# Patient Record
Sex: FEMALE | Race: WHITE | NOT HISPANIC OR LATINO | Employment: UNEMPLOYED | URBAN - METROPOLITAN AREA
[De-identification: names, ages, dates, MRNs, and addresses within clinical notes are randomized per-mention and may not be internally consistent; named-entity substitution may affect disease eponyms.]

---

## 2017-11-27 ENCOUNTER — COMMUNICATION - HEALTHEAST (OUTPATIENT)
Dept: INTERNAL MEDICINE | Facility: CLINIC | Age: 57
End: 2017-11-27

## 2017-11-29 ENCOUNTER — AMBULATORY - HEALTHEAST (OUTPATIENT)
Dept: INTERNAL MEDICINE | Facility: CLINIC | Age: 57
End: 2017-11-29

## 2018-01-25 ENCOUNTER — OFFICE VISIT (OUTPATIENT)
Dept: FAMILY MEDICINE | Facility: CLINIC | Age: 58
End: 2018-01-25

## 2018-01-25 ENCOUNTER — AMBULATORY - HEALTHEAST (OUTPATIENT)
Dept: MULTI SPECIALTY CLINIC | Facility: CLINIC | Age: 58
End: 2018-01-25

## 2018-01-25 ENCOUNTER — TRANSFERRED RECORDS (OUTPATIENT)
Dept: HEALTH INFORMATION MANAGEMENT | Facility: CLINIC | Age: 58
End: 2018-01-25

## 2018-01-25 VITALS
RESPIRATION RATE: 16 BRPM | DIASTOLIC BLOOD PRESSURE: 71 MMHG | SYSTOLIC BLOOD PRESSURE: 123 MMHG | TEMPERATURE: 97.9 F | HEART RATE: 66 BPM

## 2018-01-25 DIAGNOSIS — Z00.00 ROUTINE GENERAL MEDICAL EXAMINATION AT A HEALTH CARE FACILITY: Primary | ICD-10-CM

## 2018-01-25 LAB
HPV_EXT - HISTORICAL: NORMAL
PAP SMEAR - HIM PATIENT REPORTED: NORMAL

## 2018-01-25 NOTE — MR AVS SNAPSHOT
After Visit Summary   2018    Yoana Weeks    MRN: [id number]           Patient Information     Date Of Birth          1960        Visit Information        Provider Department      2018 8:45 PM Clinician, Lupe rashi [facility]        Today's Diagnoses     Routine general medical examination at a health care facility    -  1       Follow-ups after your visit        Who to contact     Please call your clinic at [phone] to:    Ask questions about your health    Make or cancel appointments    Discuss your medicines    Learn about your test results    Speak to your doctor   If you have compliments or concerns about an experience at your clinic, or if you wish to file a complaint, please contact [facility] Patient Relations at [phone] or email us at [email or web address]         Additional Information About Your Visit        MyChart Information     [employer] is an electronic gateway that provides easy, online access to your medical records. With [employer], you can request a clinic appointment, read your test results, renew a prescription or communicate with your care team.     To sign up for [employer] visit the website at [email or web address]   You will be asked to enter the access code listed below, as well as some personal information. Please follow the directions to create your username and password.     Your access code is: [email or web address]  Expires: 2018 11:09 PM     Your access code will  in 90 days. If you need help or a new code, please contact your [facility] Clinic or call [phone] for assistance.        Care EveryWhere ID     This is your Care EveryWhere ID. This could be used by other organizations to access your [place] medical records  [id number]        Your Vitals Were     Pulse Temperature Respirations             66 97.9  F (36.6  C) (Oral) 16          Blood Pressure  from Last 3 Encounters:   01/25/18 123/71    Weight from Last 3 Encounters:   No data found for Wt              We Performed the Following     HPV High Risk Types DNA Cervical     Pap imaged thin layer screen with HPV - recommended age 30 - 65 years (select HPV order below)        Primary Care Provider    None Specified       No primary provider on file.        Equal Access to Services     [facility]: Geremias Vásquez, georgia thompson, maria elena mcgeealmaharsha cruz, ashlee luis . So [facility] [phone].    ATENCIÓN: Si habla español, tiene a hernandez disposición servicios gratuitos de asistencia lingüística. Llame al [phone].    We comply with applicable federal civil rights laws and [place] laws. We do not discriminate on the basis of race, color, national origin, age, disability, sex, sexual orientation, or gender identity.            Thank you!     Thank you for choosing [facility]  for your care. Our goal is always to provide you with excellent care. Hearing back from our patients is one way we can continue to improve our services. Please take a few minutes to complete the written survey that you may receive in the mail after your visit with us. Thank you!             Your Updated Medication List - Protect others around you: Learn how to safely use, store and throw away your medicines at www.disposemymeds.org.      Notice  As of 1/25/2018 11:59 PM    You have not been prescribed any medications.

## 2018-01-26 NOTE — PROGRESS NOTES
Date of Visit: January 25, 2018  [facility] CERVICAL CANCER SCREENING VISIT AND CLINICAL BREAST EXAM    S: Yoana Weeks is a 57 year old female who presents to [facility] to receive a pap smear through the RAMON screening program, clinical breast exam, and mammogram. She has no other complaints. She is not currently sexually active and denies vaginal discharge or bleeding. She has never had a vaginal delivery. Her last pap smear was 2 years ago and says she has previously had an abnormal pap smear that was worked up (could not recall further details).      O:   /71 (BP Location: Right arm)  Pulse 66  Temp 97.9  F (36.6  C) (Oral)  Resp 16  General: Alert and oriented. In no acute distress.   Respiratory: Non-labored breathing.  Breast: Symmetrical breasts with no deformities, skin changes, or nipple discharge. No palpable masses. No axillary lymphadenopathy.  Gyn:   External genitalia: No lesions, erythema or rashes noted. Some atrophic changes of the labia - discomfort with insertion of speculum  Vagina: Rugated, white discharge, no lesions  Cervix: No motion tenderness, patent os without discharge, no lesions  Uterus: Small, mobile, midline, non-tender  Adnexa: No masses or tenderness bilaterally  Neuro: Moving all extremities, gait balanced.  Psych: Mood appears stable.     Assessment and Plan  Cervical Cancer Screening  A: Yoana Weeks is a 57 year old female who presents for a pap smear.     P:   Patient was screened for cervical cancer today via pap smear collection through the RAMON screening program. Appropriate follow up will be recommended to patient pending results.      Mammogram  A:Clinical breast exam with dense tissue in R breast.     P:   Patient will proceed with a screening mammogram through the RAMON screening program.     Patient was seen by medical clinician Madiha Davis  and Dr. Jovani Garcia  on January 25, 2018.

## 2018-01-29 LAB
COPATH REPORT: NORMAL
PAP: NORMAL

## 2018-01-31 LAB
FINAL DIAGNOSIS: NORMAL
HPV HR 12 DNA CVX QL NAA+PROBE: NEGATIVE
HPV16 DNA SPEC QL NAA+PROBE: NEGATIVE
HPV18 DNA SPEC QL NAA+PROBE: NEGATIVE
SPECIMEN DESCRIPTION: NORMAL
SPECIMEN SOURCE CVX/VAG CYTO: NORMAL

## 2019-01-24 ENCOUNTER — TRANSFERRED RECORDS (OUTPATIENT)
Dept: HEALTH INFORMATION MANAGEMENT | Facility: CLINIC | Age: 59
End: 2019-01-24

## 2019-01-24 ENCOUNTER — AMBULATORY - HEALTHEAST (OUTPATIENT)
Dept: MULTI SPECIALTY CLINIC | Facility: CLINIC | Age: 59
End: 2019-01-24

## 2019-01-24 ENCOUNTER — APPOINTMENT (OUTPATIENT)
Dept: FAMILY MEDICINE | Facility: CLINIC | Age: 59
End: 2019-01-24

## 2019-04-29 ENCOUNTER — COMMUNICATION - HEALTHEAST (OUTPATIENT)
Dept: INTERNAL MEDICINE | Facility: CLINIC | Age: 59
End: 2019-04-29

## 2019-04-30 ENCOUNTER — OFFICE VISIT - HEALTHEAST (OUTPATIENT)
Dept: INTERNAL MEDICINE | Facility: CLINIC | Age: 59
End: 2019-04-30

## 2019-04-30 DIAGNOSIS — I25.10 CORONARY ARTERY DISEASE INVOLVING NATIVE CORONARY ARTERY OF NATIVE HEART WITHOUT ANGINA PECTORIS: ICD-10-CM

## 2019-04-30 DIAGNOSIS — R05.9 COUGH: ICD-10-CM

## 2019-04-30 DIAGNOSIS — Z12.11 SCREEN FOR COLON CANCER: ICD-10-CM

## 2019-04-30 DIAGNOSIS — I10 ESSENTIAL HYPERTENSION, BENIGN: ICD-10-CM

## 2019-04-30 DIAGNOSIS — R19.5 CHANGE IN STOOL CALIBER: ICD-10-CM

## 2019-04-30 ASSESSMENT — MIFFLIN-ST. JEOR: SCORE: 1197.61

## 2019-05-02 ENCOUNTER — HOSPITAL ENCOUNTER (OUTPATIENT)
Dept: CT IMAGING | Facility: CLINIC | Age: 59
Discharge: HOME | End: 2019-05-02

## 2019-05-02 ENCOUNTER — AMBULATORY - HEALTHEAST (OUTPATIENT)
Dept: LAB | Facility: CLINIC | Age: 59
End: 2019-05-02

## 2019-05-02 DIAGNOSIS — I10 ESSENTIAL HYPERTENSION, BENIGN: ICD-10-CM

## 2019-05-02 DIAGNOSIS — Z12.11 SCREENING FOR COLON CANCER: ICD-10-CM

## 2019-05-02 DIAGNOSIS — R19.5 CHANGE IN STOOL CALIBER: ICD-10-CM

## 2019-05-02 DIAGNOSIS — R05.9 COUGH: ICD-10-CM

## 2019-05-02 DIAGNOSIS — I25.10 CORONARY ARTERY DISEASE INVOLVING NATIVE CORONARY ARTERY OF NATIVE HEART WITHOUT ANGINA PECTORIS: ICD-10-CM

## 2019-05-02 LAB
ALBUMIN SERPL-MCNC: 3.8 G/DL (ref 3.5–5)
ALP SERPL-CCNC: 65 U/L (ref 45–120)
ALT SERPL W P-5'-P-CCNC: <9 U/L (ref 0–45)
ANION GAP SERPL CALCULATED.3IONS-SCNC: 6 MMOL/L (ref 5–18)
AST SERPL W P-5'-P-CCNC: 12 U/L (ref 0–40)
BILIRUB SERPL-MCNC: 0.4 MG/DL (ref 0–1)
BUN SERPL-MCNC: 15 MG/DL (ref 8–22)
CALCIUM SERPL-MCNC: 9.5 MG/DL (ref 8.5–10.5)
CHLORIDE BLD-SCNC: 103 MMOL/L (ref 98–107)
CHOLEST SERPL-MCNC: 210 MG/DL
CO2 SERPL-SCNC: 29 MMOL/L (ref 22–31)
CREAT SERPL-MCNC: 1.15 MG/DL (ref 0.6–1.1)
ERYTHROCYTE [DISTWIDTH] IN BLOOD BY AUTOMATED COUNT: 12.8 % (ref 11–14.5)
FASTING STATUS PATIENT QL REPORTED: YES
GFR SERPL CREATININE-BSD FRML MDRD: 48 ML/MIN/1.73M2
GLUCOSE BLD-MCNC: 103 MG/DL (ref 70–125)
HCT VFR BLD AUTO: 37.3 % (ref 35–47)
HDLC SERPL-MCNC: 47 MG/DL
HGB BLD-MCNC: 12.5 G/DL (ref 12–16)
LDLC SERPL CALC-MCNC: 138 MG/DL
MCH RBC QN AUTO: 29.7 PG (ref 27–34)
MCHC RBC AUTO-ENTMCNC: 33.5 G/DL (ref 32–36)
MCV RBC AUTO: 89 FL (ref 80–100)
PLATELET # BLD AUTO: 322 THOU/UL (ref 140–440)
PMV BLD AUTO: 8.5 FL (ref 7–10)
POTASSIUM BLD-SCNC: 4.7 MMOL/L (ref 3.5–5)
PROT SERPL-MCNC: 7.1 G/DL (ref 6–8)
RBC # BLD AUTO: 4.21 MILL/UL (ref 3.8–5.4)
SODIUM SERPL-SCNC: 138 MMOL/L (ref 136–145)
TRIGL SERPL-MCNC: 125 MG/DL
TSH SERPL DL<=0.005 MIU/L-ACNC: 1.72 UIU/ML (ref 0.3–5)
WBC: 10.5 THOU/UL (ref 4–11)

## 2019-05-06 ENCOUNTER — COMMUNICATION - HEALTHEAST (OUTPATIENT)
Dept: INTERNAL MEDICINE | Facility: CLINIC | Age: 59
End: 2019-05-06

## 2019-05-28 ENCOUNTER — RECORDS - HEALTHEAST (OUTPATIENT)
Dept: ADMINISTRATIVE | Facility: OTHER | Age: 59
End: 2019-05-28

## 2019-06-07 ENCOUNTER — COMMUNICATION - HEALTHEAST (OUTPATIENT)
Dept: INTERNAL MEDICINE | Facility: CLINIC | Age: 59
End: 2019-06-07

## 2019-06-07 ENCOUNTER — OFFICE VISIT - HEALTHEAST (OUTPATIENT)
Dept: INTERNAL MEDICINE | Facility: CLINIC | Age: 59
End: 2019-06-07

## 2019-06-07 DIAGNOSIS — L84 CALLUS OF FOOT: ICD-10-CM

## 2019-06-07 DIAGNOSIS — Z80.52 FAMILY HISTORY OF BLADDER CANCER: ICD-10-CM

## 2019-06-07 DIAGNOSIS — R05.3 CHRONIC COUGH: ICD-10-CM

## 2019-06-07 DIAGNOSIS — I10 ESSENTIAL HYPERTENSION, BENIGN: ICD-10-CM

## 2019-06-07 DIAGNOSIS — I25.10 CORONARY ARTERY DISEASE INVOLVING NATIVE CORONARY ARTERY OF NATIVE HEART WITHOUT ANGINA PECTORIS: ICD-10-CM

## 2019-06-07 DIAGNOSIS — R49.0 HOARSENESS: ICD-10-CM

## 2019-06-07 DIAGNOSIS — F17.200 TOBACCO DEPENDENCE SYNDROME: ICD-10-CM

## 2019-06-07 LAB
ALBUMIN UR-MCNC: NEGATIVE MG/DL
APPEARANCE UR: CLEAR
ATRIAL RATE - MUSE: 65 BPM
BILIRUB UR QL STRIP: NEGATIVE
COLOR UR AUTO: YELLOW
DIASTOLIC BLOOD PRESSURE - MUSE: NORMAL MMHG
GLUCOSE UR STRIP-MCNC: NEGATIVE MG/DL
HGB UR QL STRIP: NEGATIVE
INTERPRETATION ECG - MUSE: NORMAL
KETONES UR STRIP-MCNC: NEGATIVE MG/DL
LEUKOCYTE ESTERASE UR QL STRIP: NEGATIVE
NITRATE UR QL: NEGATIVE
P AXIS - MUSE: 70 DEGREES
PH UR STRIP: 5.5 [PH] (ref 5–8)
PR INTERVAL - MUSE: 180 MS
QRS DURATION - MUSE: 80 MS
QT - MUSE: 436 MS
QTC - MUSE: 453 MS
R AXIS - MUSE: 70 DEGREES
SP GR UR STRIP: <=1.005 (ref 1–1.03)
SYSTOLIC BLOOD PRESSURE - MUSE: NORMAL MMHG
T AXIS - MUSE: 62 DEGREES
UROBILINOGEN UR STRIP-ACNC: NORMAL
VENTRICULAR RATE- MUSE: 65 BPM

## 2019-06-07 RX ORDER — TRIAMTERENE AND HYDROCHLOROTHIAZIDE 37.5; 25 MG/1; MG/1
1 CAPSULE ORAL DAILY
Qty: 90 CAPSULE | Refills: 3 | Status: SHIPPED | OUTPATIENT
Start: 2019-06-07

## 2019-06-07 ASSESSMENT — MIFFLIN-ST. JEOR: SCORE: 1198.16

## 2019-06-12 ENCOUNTER — RECORDS - HEALTHEAST (OUTPATIENT)
Dept: ADMINISTRATIVE | Facility: OTHER | Age: 59
End: 2019-06-12

## 2019-08-09 ENCOUNTER — OFFICE VISIT - HEALTHEAST (OUTPATIENT)
Dept: INTERNAL MEDICINE | Facility: CLINIC | Age: 59
End: 2019-08-09

## 2019-08-09 DIAGNOSIS — E78.5 HYPERLIPIDEMIA, UNSPECIFIED HYPERLIPIDEMIA TYPE: ICD-10-CM

## 2019-08-09 DIAGNOSIS — Z72.0 TOBACCO ABUSE: ICD-10-CM

## 2019-08-09 DIAGNOSIS — Z91.148 NONCOMPLIANCE WITH MEDICATION REGIMEN: ICD-10-CM

## 2019-08-09 DIAGNOSIS — I10 ESSENTIAL HYPERTENSION, BENIGN: ICD-10-CM

## 2019-08-09 DIAGNOSIS — I25.10 CORONARY ARTERY DISEASE INVOLVING NATIVE CORONARY ARTERY OF NATIVE HEART WITHOUT ANGINA PECTORIS: ICD-10-CM

## 2019-08-09 ASSESSMENT — MIFFLIN-ST. JEOR: SCORE: 1193.08

## 2019-08-14 ENCOUNTER — RECORDS - HEALTHEAST (OUTPATIENT)
Dept: ADMINISTRATIVE | Facility: OTHER | Age: 59
End: 2019-08-14

## 2019-09-03 ENCOUNTER — RECORDS - HEALTHEAST (OUTPATIENT)
Dept: ADMINISTRATIVE | Facility: OTHER | Age: 59
End: 2019-09-03

## 2019-09-23 ENCOUNTER — RECORDS - HEALTHEAST (OUTPATIENT)
Dept: ADMINISTRATIVE | Facility: OTHER | Age: 59
End: 2019-09-23

## 2019-10-15 ENCOUNTER — RECORDS - HEALTHEAST (OUTPATIENT)
Dept: ADMINISTRATIVE | Facility: OTHER | Age: 59
End: 2019-10-15

## 2019-10-24 ENCOUNTER — OFFICE VISIT - HEALTHEAST (OUTPATIENT)
Dept: INTERNAL MEDICINE | Facility: CLINIC | Age: 59
End: 2019-10-24

## 2019-10-24 DIAGNOSIS — J44.9 CHRONIC OBSTRUCTIVE PULMONARY DISEASE, UNSPECIFIED COPD TYPE (H): ICD-10-CM

## 2019-10-24 DIAGNOSIS — E78.5 HYPERLIPIDEMIA, UNSPECIFIED HYPERLIPIDEMIA TYPE: ICD-10-CM

## 2019-10-24 DIAGNOSIS — I10 ESSENTIAL HYPERTENSION, BENIGN: ICD-10-CM

## 2019-10-24 DIAGNOSIS — I25.10 CORONARY ARTERY DISEASE INVOLVING NATIVE CORONARY ARTERY OF NATIVE HEART WITHOUT ANGINA PECTORIS: ICD-10-CM

## 2019-10-24 LAB
ANION GAP SERPL CALCULATED.3IONS-SCNC: 9 MMOL/L (ref 5–18)
BASOPHILS # BLD AUTO: 0.1 THOU/UL (ref 0–0.2)
BASOPHILS NFR BLD AUTO: 1 % (ref 0–2)
BUN SERPL-MCNC: 10 MG/DL (ref 8–22)
CALCIUM SERPL-MCNC: 9.7 MG/DL (ref 8.5–10.5)
CHLORIDE BLD-SCNC: 105 MMOL/L (ref 98–107)
CHOLEST SERPL-MCNC: 255 MG/DL
CO2 SERPL-SCNC: 26 MMOL/L (ref 22–31)
CREAT SERPL-MCNC: 1.1 MG/DL (ref 0.6–1.1)
EOSINOPHIL # BLD AUTO: 0.1 THOU/UL (ref 0–0.4)
EOSINOPHIL NFR BLD AUTO: 1 % (ref 0–6)
ERYTHROCYTE [DISTWIDTH] IN BLOOD BY AUTOMATED COUNT: 13.6 % (ref 11–14.5)
FASTING STATUS PATIENT QL REPORTED: YES
GFR SERPL CREATININE-BSD FRML MDRD: 51 ML/MIN/1.73M2
GLUCOSE BLD-MCNC: 93 MG/DL (ref 70–125)
HCT VFR BLD AUTO: 39.1 % (ref 35–47)
HDLC SERPL-MCNC: 53 MG/DL
HGB BLD-MCNC: 12.4 G/DL (ref 12–16)
LDLC SERPL CALC-MCNC: 177 MG/DL
LYMPHOCYTES # BLD AUTO: 2.6 THOU/UL (ref 0.8–4.4)
LYMPHOCYTES NFR BLD AUTO: 31 % (ref 20–40)
MCH RBC QN AUTO: 29.3 PG (ref 27–34)
MCHC RBC AUTO-ENTMCNC: 31.7 G/DL (ref 32–36)
MCV RBC AUTO: 92 FL (ref 80–100)
MONOCYTES # BLD AUTO: 0.5 THOU/UL (ref 0–0.9)
MONOCYTES NFR BLD AUTO: 6 % (ref 2–10)
NEUTROPHILS # BLD AUTO: 5.2 THOU/UL (ref 2–7.7)
NEUTROPHILS NFR BLD AUTO: 62 % (ref 50–70)
PLATELET # BLD AUTO: 377 THOU/UL (ref 140–440)
PMV BLD AUTO: 11.4 FL (ref 8.5–12.5)
POTASSIUM BLD-SCNC: 5 MMOL/L (ref 3.5–5)
RBC # BLD AUTO: 4.23 MILL/UL (ref 3.8–5.4)
SODIUM SERPL-SCNC: 140 MMOL/L (ref 136–145)
TRIGL SERPL-MCNC: 125 MG/DL
WBC: 8.4 THOU/UL (ref 4–11)

## 2019-10-24 ASSESSMENT — MIFFLIN-ST. JEOR: SCORE: 1202.69

## 2019-10-25 ENCOUNTER — AMBULATORY - HEALTHEAST (OUTPATIENT)
Dept: INTERNAL MEDICINE | Facility: CLINIC | Age: 59
End: 2019-10-25

## 2019-10-25 DIAGNOSIS — E78.5 HYPERLIPIDEMIA, UNSPECIFIED HYPERLIPIDEMIA TYPE: ICD-10-CM

## 2019-10-25 RX ORDER — ATORVASTATIN CALCIUM 20 MG/1
20 TABLET, FILM COATED ORAL DAILY
Qty: 90 TABLET | Refills: 3 | Status: SHIPPED | OUTPATIENT
Start: 2019-10-25

## 2019-10-28 ENCOUNTER — COMMUNICATION - HEALTHEAST (OUTPATIENT)
Dept: INTERNAL MEDICINE | Facility: CLINIC | Age: 59
End: 2019-10-28

## 2020-03-30 ENCOUNTER — COMMUNICATION - HEALTHEAST (OUTPATIENT)
Dept: INTERNAL MEDICINE | Facility: CLINIC | Age: 60
End: 2020-03-30

## 2021-01-11 ENCOUNTER — HOSPITAL ENCOUNTER (EMERGENCY)
Facility: HOSPITAL | Age: 61
Discharge: LEFT BEFORE DISCHARGE | End: 2021-01-11

## 2021-01-11 VITALS
WEIGHT: 124 LBS | HEIGHT: 63 IN | DIASTOLIC BLOOD PRESSURE: 62 MMHG | HEART RATE: 81 BPM | BODY MASS INDEX: 21.97 KG/M2 | TEMPERATURE: 98.2 F | OXYGEN SATURATION: 98 % | RESPIRATION RATE: 17 BRPM | SYSTOLIC BLOOD PRESSURE: 141 MMHG

## 2021-01-11 LAB
ALBUMIN SERPL-MCNC: 4.2 G/DL (ref 3.5–5.2)
ALBUMIN/GLOB SERPL: 1.1 G/DL
ALP SERPL-CCNC: 122 U/L (ref 39–117)
ALT SERPL W P-5'-P-CCNC: 9 U/L (ref 1–33)
ANION GAP SERPL CALCULATED.3IONS-SCNC: 13 MMOL/L (ref 5–15)
AST SERPL-CCNC: 18 U/L (ref 1–32)
BASOPHILS # BLD AUTO: 0.07 10*3/MM3 (ref 0–0.2)
BASOPHILS NFR BLD AUTO: 0.5 % (ref 0–1.5)
BILIRUB SERPL-MCNC: 0.7 MG/DL (ref 0–1.2)
BUN SERPL-MCNC: 10 MG/DL (ref 8–23)
BUN/CREAT SERPL: 9.5 (ref 7–25)
CALCIUM SPEC-SCNC: 9.4 MG/DL (ref 8.6–10.5)
CHLORIDE SERPL-SCNC: 93 MMOL/L (ref 98–107)
CO2 SERPL-SCNC: 29 MMOL/L (ref 22–29)
CREAT SERPL-MCNC: 1.05 MG/DL (ref 0.57–1)
DEPRECATED RDW RBC AUTO: 43.8 FL (ref 37–54)
EOSINOPHIL # BLD AUTO: 0.04 10*3/MM3 (ref 0–0.4)
EOSINOPHIL NFR BLD AUTO: 0.3 % (ref 0.3–6.2)
ERYTHROCYTE [DISTWIDTH] IN BLOOD BY AUTOMATED COUNT: 14.1 % (ref 12.3–15.4)
GFR SERPL CREATININE-BSD FRML MDRD: 53 ML/MIN/1.73
GFR SERPL CREATININE-BSD FRML MDRD: 64 ML/MIN/1.73
GLOBULIN UR ELPH-MCNC: 3.7 GM/DL
GLUCOSE SERPL-MCNC: 126 MG/DL (ref 65–99)
HCT VFR BLD AUTO: 36.8 % (ref 34–46.6)
HGB BLD-MCNC: 12.8 G/DL (ref 12–15.9)
HOLD SPECIMEN: NORMAL
HOLD SPECIMEN: NORMAL
IMM GRANULOCYTES # BLD AUTO: 0.06 10*3/MM3 (ref 0–0.05)
IMM GRANULOCYTES NFR BLD AUTO: 0.4 % (ref 0–0.5)
LYMPHOCYTES # BLD AUTO: 2.23 10*3/MM3 (ref 0.7–3.1)
LYMPHOCYTES NFR BLD AUTO: 15 % (ref 19.6–45.3)
MCH RBC QN AUTO: 30 PG (ref 26.6–33)
MCHC RBC AUTO-ENTMCNC: 34.8 G/DL (ref 31.5–35.7)
MCV RBC AUTO: 86.2 FL (ref 79–97)
MONOCYTES # BLD AUTO: 0.99 10*3/MM3 (ref 0.1–0.9)
MONOCYTES NFR BLD AUTO: 6.6 % (ref 5–12)
NEUTROPHILS NFR BLD AUTO: 11.51 10*3/MM3 (ref 1.7–7)
NEUTROPHILS NFR BLD AUTO: 77.2 % (ref 42.7–76)
NRBC BLD AUTO-RTO: 0 /100 WBC (ref 0–0.2)
PLATELET # BLD AUTO: 437 10*3/MM3 (ref 140–450)
PMV BLD AUTO: 10.3 FL (ref 6–12)
POTASSIUM SERPL-SCNC: 3.2 MMOL/L (ref 3.5–5.2)
PROT SERPL-MCNC: 7.9 G/DL (ref 6–8.5)
RBC # BLD AUTO: 4.27 10*6/MM3 (ref 3.77–5.28)
SODIUM SERPL-SCNC: 135 MMOL/L (ref 136–145)
TROPONIN T SERPL-MCNC: <0.01 NG/ML (ref 0–0.03)
WBC # BLD AUTO: 14.9 10*3/MM3 (ref 3.4–10.8)
WHOLE BLOOD HOLD SPECIMEN: NORMAL
WHOLE BLOOD HOLD SPECIMEN: NORMAL

## 2021-01-11 PROCEDURE — 85025 COMPLETE CBC W/AUTO DIFF WBC: CPT

## 2021-01-11 PROCEDURE — 93005 ELECTROCARDIOGRAM TRACING: CPT

## 2021-01-11 PROCEDURE — 84484 ASSAY OF TROPONIN QUANT: CPT

## 2021-01-11 PROCEDURE — 99211 OFF/OP EST MAY X REQ PHY/QHP: CPT

## 2021-01-11 PROCEDURE — 80053 COMPREHEN METABOLIC PANEL: CPT

## 2021-01-11 PROCEDURE — 93010 ELECTROCARDIOGRAM REPORT: CPT | Performed by: INTERNAL MEDICINE

## 2021-01-11 RX ORDER — SODIUM CHLORIDE 0.9 % (FLUSH) 0.9 %
10 SYRINGE (ML) INJECTION AS NEEDED
Status: DISCONTINUED | OUTPATIENT
Start: 2021-01-11 | End: 2021-01-11 | Stop reason: HOSPADM

## 2021-01-11 RX ORDER — ASPIRIN 81 MG/1
324 TABLET, CHEWABLE ORAL ONCE
Status: DISCONTINUED | OUTPATIENT
Start: 2021-01-11 | End: 2021-01-11 | Stop reason: HOSPADM

## 2021-01-11 NOTE — ED NOTES
Pt states she needs to be gone by 1600 or 15-20 minutes before the sun goes down as she cannot see well at night and isn't in a place she is familiar with.      Katie Holm  01/11/21 1500

## 2021-01-12 ENCOUNTER — HOSPITAL ENCOUNTER (OUTPATIENT)
Dept: GENERAL RADIOLOGY | Facility: HOSPITAL | Age: 61
Discharge: HOME | End: 2021-01-12
Admitting: NURSE PRACTITIONER

## 2021-01-12 LAB
QT INTERVAL: 416 MS
QTC INTERVAL: 422 MS

## 2021-01-12 PROCEDURE — U0004 COV-19 TEST NON-CDC HGH THRU: HCPCS | Performed by: NURSE PRACTITIONER

## 2021-01-12 PROCEDURE — 71046 X-RAY EXAM CHEST 2 VIEWS: CPT

## 2021-03-24 ENCOUNTER — IMMUNIZATION (OUTPATIENT)
Dept: VACCINE CLINIC | Facility: HOSPITAL | Age: 61
End: 2021-03-24

## 2021-03-24 PROCEDURE — 91301 HC SARSCO02 VAC 100MCG/0.5ML IM: CPT | Performed by: OBSTETRICS & GYNECOLOGY

## 2021-03-24 PROCEDURE — 0011A: CPT | Performed by: OBSTETRICS & GYNECOLOGY

## 2021-04-01 ENCOUNTER — TRANSCRIBE ORDERS (OUTPATIENT)
Dept: ADMINISTRATIVE | Facility: HOSPITAL | Age: 61
End: 2021-04-01

## 2021-04-01 DIAGNOSIS — Z12.31 OTHER SCREENING MAMMOGRAM: Primary | ICD-10-CM

## 2021-04-21 ENCOUNTER — IMMUNIZATION (OUTPATIENT)
Dept: VACCINE CLINIC | Facility: HOSPITAL | Age: 61
End: 2021-04-21

## 2021-04-21 PROCEDURE — 0012A: CPT | Performed by: OBSTETRICS & GYNECOLOGY

## 2021-04-21 PROCEDURE — 91301 HC SARSCO02 VAC 100MCG/0.5ML IM: CPT | Performed by: OBSTETRICS & GYNECOLOGY

## 2021-05-26 ENCOUNTER — RECORDS - HEALTHEAST (OUTPATIENT)
Dept: ADMINISTRATIVE | Facility: CLINIC | Age: 61
End: 2021-05-26

## 2021-05-27 ENCOUNTER — RECORDS - HEALTHEAST (OUTPATIENT)
Dept: ADMINISTRATIVE | Facility: CLINIC | Age: 61
End: 2021-05-27

## 2021-05-28 ENCOUNTER — RECORDS - HEALTHEAST (OUTPATIENT)
Dept: ADMINISTRATIVE | Facility: CLINIC | Age: 61
End: 2021-05-28

## 2021-05-28 NOTE — TELEPHONE ENCOUNTER
FYI - Status Update  Who is Calling: Patient  Update: Patient stated she has a colonoscopy scheduled on 5/28/19 with [facility] in [place].  Okay to leave a detailed message?:  No return call needed

## 2021-05-28 NOTE — PROGRESS NOTES
Office Visit   Yoana Weeks   58 y.o. female    Date of Visit: 4/30/2019    Chief Complaint   Patient presents with     Bowel issues     Alternating between constipation and loose stools for 1 year, no blood        Assessment and Plan   1. Change in stool caliber  This is been going on for about a year.  She has a family history of colon cancer in her dad.  I will get blood work and set her up for colonoscopy.  We will plan to see her back in a few weeks to reassess.  She is in agreement with this plan.    - HM2(CBC w/o Differential); Future  - Comprehensive Metabolic Panel; Future  - Thyroid [place]; Future    2. Coronary artery disease involving native coronary artery of native heart without angina pectoris  She currently is not on appropriate medication.  Her blood pressure is borderline high today.  I will set her up for fasting lipid panel, glucose, electrolytes and creatinine.  She is going to keep an eye on her blood pressure outside the clinic and I will see her back in about 3 weeks to reassess.  She has not had any recent chest pain or palpitations.    - Lipid [place]; Future  - Comprehensive Metabolic Panel; Future  - Thyroid C[place]; Future    3. Cough  She notes an ongoing cough.  We will go ahead and get an x-ray today.  Recommend smoking cessation.  - XR Chest 2 Views    ADDENDUM: Chest x-ray came back with increased opacities in the apices.  I discussed this with Yoana and will set up a CT scan to evaluate this further.    4. Essential hypertension, benign  We will plan to recheck her blood pressure in a few weeks.  Recommend she continue to monitor it outside the clinic.  Lab work will be scheduled.    - Comprehensive Metabolic Panel; Future  - Thyroid Cascade; Future    5. Screen for colon cancer  We are ordering a colonoscopy.  - Ambulatory referral for Colonoscopy         Return in about 3 weeks (around 5/21/2019) for Recheck stools and BP.     History of Present Illness   This 58 y.o. old  comes in due to a couple of concerns.  First of all she is due for colon cancer screening and has noted some colon changes.  The caliber her of her stool has changed and she has had increased problems with constipation mixed with loose stools.  This is been going on for about a year.  She has not had any black stools, tarry stools, or blood in her stool.  She has not had any weight loss.  She does note that her dad had colon cancer in his 70s.  She also has a chronic cough which perhaps is a little bit worse.  She is a smoker.  She like to get a chest x-ray.  She has a history of coronary artery disease and currently is not on a statin medication, beta blocker, or even an aspirin.  It has been several years since she has had blood work done.  Her blood pressure is elevated.  She is agreeable to getting set up for a recheck of her labs.  She has not had any recent symptoms of chest pain, palpitations, dyspnea on exertion.    Review of Systems: As noted above.     Medications, Allergies and Problem List   Patient Active Problem List   Diagnosis     Coronary artery disease, stent RCA 2006     COPD (chronic obstructive pulmonary disease) (H)     Tobacco abuse     Hyperlipidemia     Essential hypertension, benign     Current Outpatient Medications   Medication Sig Dispense Refill     esomeprazole (NEXIUM) 20 MG capsule Take 40 mg by mouth as needed.        naproxen sodium (ALEVE) 220 MG tablet Take 220 mg by mouth as needed for pain.       aspirin 81 MG EC tablet Take 81 mg by mouth daily.       No current facility-administered medications for this visit.      Allergies   Allergen Reactions     Nuts - Unspecified Anaphylaxis     Fumaric Acid Other (See Comments)     patient states she becomes belligerent     Morphine Other (See Comments) and Unknown     patient states she becomes belligerent  Gets very angry     Vicodin [Hydrocodone-Acetaminophen]      Causes nightmares          Physical Exam     /80   Pulse 70    "Ht 5' 6.25\" (1.683 m)   Wt 136 lb (61.7 kg)   BMI 21.79 kg/m      General:  Patient is alert and in no apparent distress.  Neck:  Supple with no adenopathy or thyroid abnormality noted.  Cardiovascular:  Regular rate and rhythm, normal S1/S2, no murmurs, rubs, or gallop.  Pulmonary:  Lungs are clear to auscultation bilaterally with normal respiratory effort.  Gastrointestinal:  Abdomen is soft, non-tender, non-distended, with no organomegaly, rebound or guarding.  Skin:  Warm and well perfused with no rashes or abnormalities.         Additional Information   Social History     Tobacco Use     Smoking status: Current Every Day Smoker     Packs/day: 0.25     Types: Cigarettes     Smokeless tobacco: Never Used   Substance Use Topics     Alcohol use: No     Drug use: No         Time:      Kristy Rodgers MD    "

## 2021-05-28 NOTE — PROGRESS NOTES
I discussed the CT results with Yoana.  Fortunately there is no mass but scarring in the lung apices.  Recommend that she stop smoking.  She states that she is working on this.

## 2021-05-28 NOTE — TELEPHONE ENCOUNTER
New Appointment Needed  What is the reason for the visit:    Same Date/Next Day Appt Request  What is the reason for your visit?: multiple concerns    Provider Preference: Dr. Odonnell- this patient will be considered a new patient on 05.23.19 as she was last seen on 05.23.2016.  Please contact patient to schedule this appropriately  How soon do you need to be seen?: first available-patient would like clinic to schedule her and leave the information on her voicemail  Waitlist offered?: No  Okay to leave a detailed message:  Yes- schedule patient and leave info on voice mail

## 2021-05-28 NOTE — PROGRESS NOTES
Discussed the lab results with Yoana.  Her creatinine is gone to 1.2 and I recommend that she monitor her blood pressure to make sure that that is not an ongoing problem.  She is in agreement with this plan.

## 2021-05-29 ENCOUNTER — RECORDS - HEALTHEAST (OUTPATIENT)
Dept: ADMINISTRATIVE | Facility: CLINIC | Age: 61
End: 2021-05-29

## 2021-05-29 NOTE — PROGRESS NOTES
OFFICE VISIT NOTE  Yoana Weeks   58 y.o. female            Assessment/Plan for  Yoana Weeks is a 58 y.o. female.  No Patient Care Coordination Note on file.             1.  Hypertension-needs Rx.  2.  Coronary artery disease-asymptomatic.  History of MI 2006-inferior wall status post right coronary stent.  Inferior wall hypokinesis.  3.  Hyperlipidemia-needs Rx  4.  Tobacco dependence-quit x1 month.  21 mcg patch.  Good job.  5.  Chronic cough with recent normal CT scan  6.  Family history of bladder cancer-check urinalysis.  Brother Arslan Osuna family history of colon cancer with recent normal colonoscopy.  She should be done in 5 years not the stated 10 years    Plan:  Laboratory  Dyazide  Atorvastatin  ECG  Urinalysis  Review labs from several weeks ago  Clinic follow-up 2 months  Continue smoking cessation/Nicorette  Patient Instructions          Diagnoses and all orders for this visit:    Family history of bladder cancer  -     Urinalysis-UC if Indicated    Chronic cough    Tobacco dependence syndrome  -     nicotine (NICODERM CQ) 21 mg/24 hr; Place 1 patch on the skin daily.  Dispense: 30 patch; Refill: 1    Essential hypertension, benign  -     triamterene-hydrochlorothiazide (DYAZIDE) 37.5-25 mg per capsule; Take 1 capsule by mouth daily. For high bp  Dispense: 90 capsule; Refill: 3    Coronary artery disease involving native coronary artery of native heart without angina pectoris  -     atorvastatin (LIPITOR) 10 MG tablet; Take 1 tablet (10 mg total) by mouth daily. For cholesterol  Dispense: 90 tablet; Refill: 11  -     Electrocardiogram Perform - Clinic    Hoarseness  -     Ambulatory referral to ENT    Callus of foot  -     Ambulatory referral to Podiatry        Medications after visit  Current Outpatient Medications   Medication Sig Dispense Refill     aspirin 81 MG EC tablet Take 81 mg by mouth daily.       esomeprazole (NEXIUM) 20 MG capsule Take 40 mg by mouth as needed.        naproxen sodium  (ALEVE) 220 MG tablet Take 220 mg by mouth as needed for pain.       atorvastatin (LIPITOR) 10 MG tablet Take 1 tablet (10 mg total) by mouth daily. For cholesterol 90 tablet 11     nicotine (NICODERM CQ) 21 mg/24 hr Place 1 patch on the skin daily. 30 patch 1     triamterene-hydrochlorothiazide (DYAZIDE) 37.5-25 mg per capsule Take 1 capsule by mouth daily. For high bp 90 capsule 3     No current facility-administered medications for this visit.          This provider spent greater than 40 min. face-to-face time with the patient and/or his family.  More than half this time was spent in counseling and or coordination of care which was consistent with the nature of this patient's problems which are listed and described in the assessment and plan.      Parminder Odonnell MD  Internal medicine  [facility]  [phone]  [email or web address]    Much or all of the text in this note was generated through the use of Dragon Dictate voice-to-text software. Errors in spelling or words which seem out of context are unintentional.   Sound alike errors, in particular, may have escaped editing.                 Subjective:   Chief Complaint:  Follow-up (Last seen )    Patient has not been seen by me personally for 3 and half years  I took care of her for decades  She recently saw my colleague because of a cough.  She is smoking a long time and has COPD.  Chest x-ray CT scan scarring without any evidence of neoplasm of breath    She has not smoked for a month  She is on a 21 mcg Nicorette patch.  She is tolerating this.    She has chronic anxiety  Parents are  and her brother is   And on off decade relationship is off.  She may be moving to [facility].  She does have some cousins there.    She is not on cholesterol medication.  She has coronary disease.  Hyperlipoproteinemia-patient is tolerating medication.  There are no myalgia, arthralgia, weakness.  No Bowel issues.   Liver profile has been normal.  Patient has met cholesterol goals.      History of stent  ISCHEMIC HEART DISEASE- Other than reported, the patient is not having any angina, chest pain, epigastric pain, dyspnea, palpitation, lightheadedness, syncope, excessive fatigue, exertional diaphoresis.-The patient is taking medication as prescribed. No change in nitroglycerin usage      Brother  from bladder cancer.  She has no gross hematuria no urinary issues  Review of Systems:     Extensive 10-point review of systems was performed. Please see the HPI for problem specific pertinent review of systems.     Patient does note her appetite is okay  She is selling her family home-6000 square feet.  She is moving to [place] as noted above    She has some shoulder knee pain which Aleve helps.  It does not bother her stomach      Otherwise, the following systems are noncontributory including constitutional, eyes, ears, nose and throat, cardiovascular, respiratory, gastrointestinal, genitourinary, musculoskeletal,neurological, skin and/or breast, endocrine, hematologic/lymph, allergic/immunologic and psychiatric.              Medications:  Current Outpatient Medications on File Prior to Visit   Medication Sig     aspirin 81 MG EC tablet Take 81 mg by mouth daily.     esomeprazole (NEXIUM) 20 MG capsule Take 40 mg by mouth as needed.      naproxen sodium (ALEVE) 220 MG tablet Take 220 mg by mouth as needed for pain.     No current facility-administered medications on file prior to visit.             Allergies:  Allergies   Allergen Reactions     Nuts - Unspecified Anaphylaxis     Fumaric Acid Other (See Comments)     patient states she becomes belligerent     Morphine Other (See Comments) and Unknown     patient states she becomes belligerent  Gets very angry     Vicodin [Hydrocodone-Acetaminophen]      Causes nightmares       PSFHx: Tobacco Status:  She  reports that she quit smoking about 4 weeks ago. Her smoking use included  "cigarettes. She smoked 0.25 packs per day. She has never used smokeless tobacco.   Alcohol Status:    Social History     Substance and Sexual Activity   Alcohol Use No       reports that she quit smoking about 4 weeks ago. Her smoking use included cigarettes. She smoked 0.25 packs per day. She has never used smokeless tobacco. She reports that she does not drink alcohol or use drugs.    Objective:    /86   Pulse 78   Ht 5' 6.25\" (1.683 m)   Wt 136 lb 1.9 oz (61.7 kg)   SpO2 99%   Breastfeeding? No   BMI 21.80 kg/m    Weight:   Wt Readings from Last 3 Encounters:   06/07/19 136 lb 1.9 oz (61.7 kg)   04/30/19 136 lb (61.7 kg)   05/23/16 130 lb 1.9 oz (59 kg)     BP Readings from Last 10 Encounters:   06/07/19 164/86   04/30/19 158/80   05/23/16 150/76   01/14/16 136/80   12/23/15 152/78   12/18/15 124/66         General-appears well, no acute distress.  Neg nodes  Coarse voice  bilat rhonci  No murmur  No edema      Review of clinical lab tests  Lab Results   Component Value Date    WBC 10.5 05/02/2019    HGB 12.5 05/02/2019    HCT 37.3 05/02/2019     05/02/2019    CHOL 210 (H) 05/02/2019    TRIG 125 05/02/2019    HDL 47 (L) 05/02/2019    ALT <9 05/02/2019    AST 12 05/02/2019     05/02/2019    K 4.7 05/02/2019     05/02/2019    CREATININE 1.15 (H) 05/02/2019    BUN 15 05/02/2019    CO2 29 05/02/2019    TSH 1.72 05/02/2019    INR 1.20 (H) 12/18/2015     Lab Results   Component Value Date    LDLCALC 138 (H) 05/02/2019         Glucose   Date/Time Value Ref Range Status   05/02/2019 07:13  70 - 125 mg/dL Final   12/18/2015 03:02  70 - 125 mg/dL Final     Recent Results (from the past 24 hour(s))   Electrocardiogram Perform - Clinic   Result Value Ref Range    SYSTOLIC BLOOD PRESSURE  mmHg    DIASTOLIC BLOOD PRESSURE  mmHg    VENTRICULAR RATE 65 BPM    ATRIAL RATE 65 BPM    P-R INTERVAL 180 ms    QRS DURATION 80 ms    Q-T INTERVAL 436 ms    QTC CALCULATION (BEZET) 453 ms    P " Axis 70 degrees    R AXIS 70 degrees    T AXIS 62 degrees    MUSE DIAGNOSIS       Sinus rhythm with Premature atrial complexes  Septal infarct , age undetermined  Abnormal ECG  No previous ECGs available     Urinalysis-UC if Indicated   Result Value Ref Range    Color, UA Yellow Colorless, Yellow, Straw, Light Yellow    Clarity, UA Clear Clear    Glucose, UA Negative Negative    Bilirubin, UA Negative Negative    Ketones, UA Negative Negative    Specific Gravity, UA <=1.005 1.005 - 1.030    Blood, UA Negative Negative    pH, UA 5.5 5.0 - 8.0    Protein, UA Negative Negative mg/dL    Urobilinogen, UA 0.2 E.U./dL 0.2 E.U./dL, 1.0 E.U./dL    Nitrite, UA Negative Negative    Leukocytes, UA Negative Negative     ECG-reviewed.  Q wave in V1 V2.  This is old and prior noted.  She also also had an echo in the past because of this.  [facility] 2005 with mild inferior apical hypokinesis  RADIOLOGY: Xr Chest 2 Views    Result Date: 4/30/2019  EXAM: XR CHEST 2 VIEWS LOCATION: [facility] DATE/TIME: 4/30/2019 2:10 PM INDICATION: Cough. COMPARISON: Chest CT 04/08/2013. 01/16/2013 radiograph. FINDINGS: Biapical opacities are worsened compared to 2013. Biapical scarring was seen on the prior CT and this could represent increased scarring, though recommend repeat chest CT to exclude a space-occupying process. Remaining lungs are hyperexpanded and clear. No pleural effusion or pneumothorax. Normal heart size. Aortic atherosclerosis. NOTE: ABNORMAL REPORT THE DICTATION ABOVE DESCRIBES AN ABNORMALITY FOR WHICH FOLLOW-UP IS NEEDED.     Ct Chest Without Contrast    Result Date: 5/2/2019  EXAM: CT CHEST WO CONTRAST LOCATION: [facility] DATE/TIME: 5/2/2019 7:11 AM INDICATION: Cough, persistent Xray abnormal COMPARISON: Chest x-ray 4/30/2019, chest CT 4/8/2013 TECHNIQUE: Helical images were obtained through the chest. Multiplanar reformats were obtained. Dose reduction techniques were used. CONTRAST: None.  FINDINGS: LUNGS AND PLEURA: There has been only minimal change compared to CT of 6 years ago. Again identified is the pleural and parenchymal scarring at both apices, as well as peripheral bullae. Pleural thickening is slightly more pronounced on the left apex but  no suggestion for mass. Diffuse changes of emphysema again present. Calcified left upper lobe granulomata. MEDIASTINUM: Calcified left suprahilar lymph nodes. Normal-sized heart. Mild coronary artery calcification. LIMITED UPPER ABDOMEN: Splenic granulomata. MUSCULOSKELETAL: Negative.     CONCLUSION: 1.  Minimal overall change in the peripheral lung and pleural scarring involving both apices. Emphysema and old granulomatous disease exposure. No mass identified.

## 2021-05-31 ENCOUNTER — RECORDS - HEALTHEAST (OUTPATIENT)
Dept: ADMINISTRATIVE | Facility: CLINIC | Age: 61
End: 2021-05-31

## 2021-05-31 NOTE — PROGRESS NOTES
OFFICE VISIT NOTE  Yoana Weeks   58 y.o. female            Assessment/Plan for  Yoana Weeks is a 58 y.o. female.  No Patient Care Coordination Note on file.       1. Essential hypertension, benign  Not controlled.  Not taking medication    2. Hyperlipidemia, unspecified hyperlipidemia type  Not taking medication    3. Coronary artery disease involving native coronary artery of native heart without angina pectoris  Magnetic    4. Tobacco abuse  Excess for 2 months of not smoking.    5.  Medication noncompliance  -Stressed the need to take medications to protect against CVA/myocardial infarction  6 seborrheic keratosis dermatologic evaluation  7.  Shoulder pain-[facility]    Plan:  Medication compliance  No labs today  Continue Nicorette  RTC 3 months    There are no Patient Instructions on file for this visit.    Diagnoses and all orders for this visit:    Essential hypertension, benign    Hyperlipidemia, unspecified hyperlipidemia type    Coronary artery disease involving native coronary artery of native heart without angina pectoris    Tobacco abuse    Noncompliance with medication regimen        Medications after visit  Current Outpatient Medications   Medication Sig Dispense Refill     aspirin 81 MG EC tablet Take 81 mg by mouth daily.       atorvastatin (LIPITOR) 10 MG tablet Take 1 tablet (10 mg total) by mouth daily. For cholesterol 90 tablet 11     esomeprazole (NEXIUM) 20 MG capsule Take 40 mg by mouth as needed.        naproxen sodium (ALEVE) 220 MG tablet Take 220 mg by mouth as needed for pain.       nicotine (NICODERM CQ) 21 mg/24 hr Place 1 patch on the skin daily. 30 patch 1     triamterene-hydrochlorothiazide (DYAZIDE) 37.5-25 mg per capsule Take 1 capsule by mouth daily. For high bp 90 capsule 3     No current facility-administered medications for this visit.                       Parminder Odonnell MD  Internal medicine  [facility] Internal Medicine  [facility]  [phone]  [email or web address]    Much or all of the text in this note was generated through the use of Dragon Dictate voice-to-text software. Errors in spelling or words which seem out of context are unintentional.   Sound alike errors, in particular, may have escaped editing.                 Subjective:   Chief Complaint:  Hypertension and Follow-up (Routine 2m visit)    Also up for sale  He did start a new life  She is alone  -Mom's dad Brother Arslan   -Alienated from person whom she was in a long-term relationship  She is not suicidal    Patient is not taking her statin  She is not taking her Dyazide  ISCHEMIC HEART DISEASE- Other than reported, the patient is not having any angina, chest pain, epigastric pain, dyspnea, palpitation, lightheadedness, syncope, excessive fatigue, exertional diaphoresis.-The patient is taking medication as prescribed. No change in nitroglycerin usage  Nicorette gum is helped her quit smoking has not smoked for 2 months.      Review of Systems:     Extensive 10-point review of systems was performed. Please see the HPI for problem specific pertinent review of systems.     Patient does note concerned about back skin lesion  Chronic shoulder pain for which she is seeing orthopedist    Otherwise, the following systems are noncontributory including constitutional, eyes, ears, nose and throat, cardiovascular, respiratory, gastrointestinal, genitourinary, musculoskeletal,neurological, skin and/or breast, endocrine, hematologic/lymph, allergic/immunologic and psychiatric.              Medications:  Current Outpatient Medications on File Prior to Visit   Medication Sig     aspirin 81 MG EC tablet Take 81 mg by mouth daily.     atorvastatin (LIPITOR) 10 MG tablet Take 1 tablet (10 mg total) by mouth daily. For cholesterol     esomeprazole (NEXIUM) 20 MG capsule Take 40 mg by mouth as needed.      naproxen sodium (ALEVE) 220 MG tablet Take 220 mg by mouth as needed for  "pain.     nicotine (NICODERM CQ) 21 mg/24 hr Place 1 patch on the skin daily.     triamterene-hydrochlorothiazide (DYAZIDE) 37.5-25 mg per capsule Take 1 capsule by mouth daily. For high bp     No current facility-administered medications on file prior to visit.             Allergies:  Allergies   Allergen Reactions     Nuts - Unspecified Anaphylaxis     Fumaric Acid Other (See Comments)     patient states she becomes belligerent     Morphine Other (See Comments) and Unknown     patient states she becomes belligerent  Gets very angry     Vicodin [Hydrocodone-Acetaminophen]      Causes nightmares       PSFHx: Tobacco Status:  She  reports that she quit smoking about 3 months ago. Her smoking use included cigarettes. She smoked 0.25 packs per day. She has never used smokeless tobacco.   Alcohol Status:    Social History     Substance and Sexual Activity   Alcohol Use No       reports that she quit smoking about 3 months ago. Her smoking use included cigarettes. She smoked 0.25 packs per day. She has never used smokeless tobacco. She reports that she does not drink alcohol or use drugs.    Objective:    /88   Pulse 77   Ht 5' 6.25\" (1.683 m)   Wt 135 lb (61.2 kg)   SpO2 98%   Breastfeeding? No   BMI 21.63 kg/m    Weight:   Wt Readings from Last 3 Encounters:   08/09/19 135 lb (61.2 kg)   06/07/19 136 lb 1.9 oz (61.7 kg)   04/30/19 136 lb (61.7 kg)     BP Readings from Last 10 Encounters:   08/09/19 168/88   06/07/19 164/86   04/30/19 158/80   05/23/16 150/76   01/14/16 136/80   12/23/15 152/78   12/18/15 124/66         General-appears well, no acute distress.  Thin individual  Weight stable  Vitals:    08/09/19 0723 08/09/19 0814   BP: 166/82 168/88   Patient Site: Left Arm    Patient Position: Sitting    Cuff Size: Adult Regular    Pulse: 77    SpO2: 98%    Weight: 135 lb (61.2 kg)    Height: 5' 6.25\" (1.683 m)    Thin person  Objective blood pressure remains up as she is not taking her medication  Pulse " regular with premature beats  Decreased breath sounds  Scattered rhonchi  No murmur  No edema  Quarter size keratoses on back appear benign        Review of clinical lab tests  Lab Results   Component Value Date    WBC 10.5 05/02/2019    HGB 12.5 05/02/2019    HCT 37.3 05/02/2019     05/02/2019    CHOL 210 (H) 05/02/2019    TRIG 125 05/02/2019    HDL 47 (L) 05/02/2019    ALT <9 05/02/2019    AST 12 05/02/2019     05/02/2019    K 4.7 05/02/2019     05/02/2019    CREATININE 1.15 (H) 05/02/2019    BUN 15 05/02/2019    CO2 29 05/02/2019    TSH 1.72 05/02/2019    INR 1.20 (H) 12/18/2015     Lab Results   Component Value Date    COLORU Yellow 06/07/2019    CLARITYU Clear 06/07/2019    GLUCOSEU Negative 06/07/2019    BILIRUBINUR Negative 06/07/2019    KETONESU Negative 06/07/2019    SPECGRAV <=1.005 06/07/2019    HGBUA Negative 06/07/2019    PHUR 5.5 06/07/2019    PROTEINUA Negative 06/07/2019    UROBILINOGEN 0.2 E.U./dL 06/07/2019    NITRITE Negative 06/07/2019    LEUKOCYTESUR Negative 06/07/2019         Glucose   Date/Time Value Ref Range Status   05/02/2019 07:13  70 - 125 mg/dL Final   12/18/2015 03:02  70 - 125 mg/dL Final     No results found for this or any previous visit (from the past 24 hour(s)).    RADIOLOGY: No results found.

## 2021-06-02 ENCOUNTER — RECORDS - HEALTHEAST (OUTPATIENT)
Dept: ADMINISTRATIVE | Facility: CLINIC | Age: 61
End: 2021-06-02

## 2021-06-02 NOTE — TELEPHONE ENCOUNTER
----- Message from Parminder Odonnell MD sent at 10/25/2019  1:55 PM CDT -----  Please call  Your cholesterol is high.    We should increase your atorvastatin from 10 mg daily to 20 mg.  I will send in a prescription    Your LDL goal is less than 70    Lab Results       Component                Value               Date                       CHOL                     255 (H)             10/24/2019                 CHOL                     210 (H)             05/02/2019            Lab Results       Component                Value               Date                       HDL                      53                  10/24/2019                 HDL                      47 (L)              05/02/2019            Lab Results       Component                Value               Date                       LDLCALC                  177 (H)             10/24/2019                 LDLCALC                  138 (H)             05/02/2019            Lab Results       Component                Value               Date                       TRIG                     125                 10/24/2019                 TRIG                     125                 05/02/2019            No components found for: CHOLHDL

## 2021-06-02 NOTE — PROGRESS NOTES
OFFICE VISIT NOTE  Yoana Weeks   58 y.o. female            Assessment/Plan for  Yoana Weeks is a 58 y.o. female.  No Patient Care Coordination Note on file.       1. Essential hypertension, benign  Controlled    2. Hyperlipidemia, unspecified hyperlipidemia type  On Rx    3. Coronary artery disease involving native coronary artery of native heart without angina pectoris  Asymptomatic    4. Chronic obstructive pulmonary disease, unspecified COPD type (H)  Off patch.  Using 7 mcg patch as needed    5. Tobacco abuse  Quit       Plan:  Continue current Rx  Continue nicotine tablet/patch  Laboratory  Clinic visit 3 months  Discussed SSRI    There are no Patient Instructions on file for this visit.    Diagnoses and all orders for this visit:    Essential hypertension, benign    Hyperlipidemia, unspecified hyperlipidemia type    Coronary artery disease involving native coronary artery of native heart without angina pectoris    Chronic obstructive pulmonary disease, unspecified COPD type (H)    Tobacco abuse    Other orders  -     Influenza, Recombinant, Inj, Quadrivalent, PF, 18+YRS        Medications after visit  Current Outpatient Medications   Medication Sig Dispense Refill     aspirin 81 MG EC tablet Take 81 mg by mouth daily.       atorvastatin (LIPITOR) 10 MG tablet Take 1 tablet (10 mg total) by mouth daily. For cholesterol 90 tablet 11     esomeprazole (NEXIUM) 20 MG capsule Take 40 mg by mouth as needed.        naproxen sodium (ALEVE) 220 MG tablet Take 220 mg by mouth as needed for pain.       nicotine (NICODERM CQ) 21 mg/24 hr Place 1 patch on the skin daily. 30 patch 1     triamterene-hydrochlorothiazide (DYAZIDE) 37.5-25 mg per capsule Take 1 capsule by mouth daily. For high bp 90 capsule 3     No current facility-administered medications for this visit.                       Parminder Odonnell MD  Internal medicine  [facility]  [phone]  [email or web address]    Much  or all of the text in this note was generated through the use of Dragon Dictate voice-to-text software. Errors in spelling or words which seem out of context are unintentional.   Sound alike errors, in particular, may have escaped editing.                 Subjective:   Chief Complaint:  Hypertension; Follow-up; and Flu Vaccine    Depressed  Tearful  Does receive support from 1 of her mother's friends-Mrs. Gallo  Declines SSRI  .  [place] still is a part of her life      Patient using nicotine patch.  7 mcg.  Now using PRN.  Not smoking.  Still has slight cough.  CT was unremarkable except for emphysema 4/2019    ISCHEMIC HEART DISEASE- Other than reported, the patient is not having any angina, chest pain, epigastric pain, dyspnea, palpitation, lightheadedness, syncope, excessive fatigue, exertional diaphoresis.-The patient is taking medication as prescribed. No change in nitroglycerin usage    Taking statin.  No myalgia arthralgia    Review of Systems:     Extensive 10-point review of systems was performed. Please see the HPI for problem specific pertinent review of systems.     Patient does note appetite is good  Dogs are ill  Not walking as much  [place]    Otherwise, the following systems are noncontributory including constitutional, eyes, ears, nose and throat, cardiovascular, respiratory, gastrointestinal, genitourinary, musculoskeletal,neurological, skin and/or breast, endocrine, hematologic/lymph, allergic/immunologic and psychiatric.              Medications:  Current Outpatient Medications on File Prior to Visit   Medication Sig     aspirin 81 MG EC tablet Take 81 mg by mouth daily.     atorvastatin (LIPITOR) 10 MG tablet Take 1 tablet (10 mg total) by mouth daily. For cholesterol     esomeprazole (NEXIUM) 20 MG capsule Take 40 mg by mouth as needed.      naproxen sodium (ALEVE) 220 MG tablet Take 220 mg by mouth as needed for pain.     nicotine (NICODERM CQ) 21 mg/24 hr  "Place 1 patch on the skin daily.     triamterene-hydrochlorothiazide (DYAZIDE) 37.5-25 mg per capsule Take 1 capsule by mouth daily. For high bp     No current facility-administered medications on file prior to visit.             Allergies:  Allergies   Allergen Reactions     Nuts - Unspecified Anaphylaxis     Fumaric Acid Other (See Comments)     patient states she becomes belligerent     Morphine Other (See Comments) and Unknown     patient states she becomes belligerent  Gets very angry     Vicodin [Hydrocodone-Acetaminophen]      Causes nightmares       PSFHx: Tobacco Status:  She  reports that she quit smoking about 5 months ago. Her smoking use included cigarettes. She smoked 0.25 packs per day. She has never used smokeless tobacco.   Alcohol Status:    Social History     Substance and Sexual Activity   Alcohol Use No       reports that she quit smoking about 5 months ago. Her smoking use included cigarettes. She smoked 0.25 packs per day. She has never used smokeless tobacco. She reports that she does not drink alcohol or use drugs.    Objective:    /78 (Patient Site: Left Arm, Patient Position: Sitting, Cuff Size: Adult Regular)   Pulse 78   Ht 5' 6.25\" (1.683 m)   Wt 137 lb 1.9 oz (62.2 kg)   SpO2 97%   Breastfeeding? No   BMI 21.97 kg/m    Weight:   Wt Readings from Last 3 Encounters:   10/24/19 137 lb 1.9 oz (62.2 kg)   08/09/19 135 lb (61.2 kg)   06/07/19 136 lb 1.9 oz (61.7 kg)     BP Readings from Last 10 Encounters:   10/24/19 124/78   08/09/19 168/88   06/07/19 164/86   04/30/19 158/80   05/23/16 150/76   01/14/16 136/80   12/23/15 152/78   12/18/15 124/66         General-appears well, no acute distress.  Pulse regular  Color is good  Lungs are clear  Cardiac exam is regular without murmur  There is no edema        Review of clinical lab tests  Lab Results   Component Value Date    WBC 10.5 05/02/2019    HGB 12.5 05/02/2019    HCT 37.3 05/02/2019     05/02/2019    CHOL 210 (H) " 05/02/2019    TRIG 125 05/02/2019    HDL 47 (L) 05/02/2019    ALT <9 05/02/2019    AST 12 05/02/2019     05/02/2019    K 4.7 05/02/2019     05/02/2019    CREATININE 1.15 (H) 05/02/2019    BUN 15 05/02/2019    CO2 29 05/02/2019    TSH 1.72 05/02/2019    INR 1.20 (H) 12/18/2015     Lab Results   Component Value Date    LDLCALC 138 (H) 05/02/2019       Glucose   Date/Time Value Ref Range Status   05/02/2019 07:13  70 - 125 mg/dL Final   12/18/2015 03:02  70 - 125 mg/dL Final     No results found for this or any previous visit (from the past 24 hour(s)).    RADIOLOGY: No results found.    Review of recent consultation-

## 2021-06-03 VITALS — WEIGHT: 136 LBS | BODY MASS INDEX: 21.86 KG/M2 | HEIGHT: 66 IN

## 2021-06-03 VITALS
SYSTOLIC BLOOD PRESSURE: 124 MMHG | OXYGEN SATURATION: 97 % | HEART RATE: 78 BPM | HEIGHT: 66 IN | WEIGHT: 137.12 LBS | DIASTOLIC BLOOD PRESSURE: 78 MMHG | BODY MASS INDEX: 22.04 KG/M2

## 2021-06-03 VITALS — WEIGHT: 136.12 LBS | HEIGHT: 66 IN | BODY MASS INDEX: 21.88 KG/M2

## 2021-06-03 VITALS — WEIGHT: 135 LBS | HEIGHT: 66 IN | BODY MASS INDEX: 21.69 KG/M2

## 2021-06-07 ENCOUNTER — APPOINTMENT (OUTPATIENT)
Dept: MAMMOGRAPHY | Facility: HOSPITAL | Age: 61
End: 2021-06-07

## 2021-06-07 NOTE — TELEPHONE ENCOUNTER
FYI - Status Update  Who is Calling: Patient  Update: Patient called to inform the provider that if they have any patients that need assistance with rides or anything like that, the patient would be willing to help. Thank you.  Okay to leave a detailed message?:  No return call needed

## 2021-06-08 ENCOUNTER — HOSPITAL ENCOUNTER (OUTPATIENT)
Dept: MAMMOGRAPHY | Facility: HOSPITAL | Age: 61
Discharge: HOME | End: 2021-06-08
Admitting: NURSE PRACTITIONER

## 2021-06-08 DIAGNOSIS — Z12.31 OTHER SCREENING MAMMOGRAM: ICD-10-CM

## 2021-06-08 PROCEDURE — 77063 BREAST TOMOSYNTHESIS BI: CPT

## 2021-06-08 PROCEDURE — 77067 SCR MAMMO BI INCL CAD: CPT

## 2021-06-16 PROBLEM — I10 ESSENTIAL HYPERTENSION, BENIGN: Status: ACTIVE | Noted: 2019-04-30

## 2021-06-16 NOTE — TELEPHONE ENCOUNTER
Telephone Encounter by Dipti Jones CMA at 4/29/2019 11:33 AM     Author: Dipti Jones CMA Service: -- Author Type: Certified Medical Assistant    Filed: 4/29/2019 11:49 AM Encounter Date: 4/29/2019 Status: Signed    : Dipti Jones CMA (Certified Medical Assistant)       Parminder Odonnell MD  You 19 minutes ago (11:14 AM)      Yes but 40 minutes and later in may so I dont jam schedule   If needs to be seen sooner see other md Parminder Odonnell MD   Internal medicine   [facility]   [phone]   [email or web address]     Spoke with patient and scheduled an appointment for Dr Odonnell in June. Patient started talking about a concern of colon cancer with her family history. I offered her an appointment with another physician for a sooner appointment. Patient was thrilled with this ides. She has been scheduled with Dr Rodgers for tomorrow.    Dipti Jones CMA

## 2021-07-03 NOTE — ADDENDUM NOTE
Addendum Note by Triston Rodgers MD at 4/30/2019  1:20 PM     Author: Triston Rodgers MD Service: -- Author Type: Physician    Filed: 4/30/2019  4:29 PM Encounter Date: 4/30/2019 Status: Signed    : Triston Rodgers MD (Physician)    Addended by: TRISTON RODGERS on: 4/30/2019 04:29 PM        Modules accepted: Orders

## 2021-07-13 ENCOUNTER — RECORDS - HEALTHEAST (OUTPATIENT)
Dept: ADMINISTRATIVE | Facility: CLINIC | Age: 61
End: 2021-07-13

## 2021-07-21 ENCOUNTER — RECORDS - HEALTHEAST (OUTPATIENT)
Dept: ADMINISTRATIVE | Facility: CLINIC | Age: 61
End: 2021-07-21

## 2021-08-24 ENCOUNTER — TRANSCRIBE ORDERS (OUTPATIENT)
Dept: ADMINISTRATIVE | Facility: HOSPITAL | Age: 61
End: 2021-08-24

## 2021-08-24 DIAGNOSIS — Z12.31 ENCOUNTER FOR SCREENING MAMMOGRAM FOR MALIGNANT NEOPLASM OF BREAST: Primary | ICD-10-CM

## 2021-08-24 DIAGNOSIS — R42 DIZZINESS AND GIDDINESS: ICD-10-CM

## 2021-09-02 ENCOUNTER — APPOINTMENT (OUTPATIENT)
Dept: ULTRASOUND IMAGING | Facility: HOSPITAL | Age: 61
End: 2021-09-02

## 2021-09-06 ENCOUNTER — HOSPITAL ENCOUNTER (INPATIENT)
Facility: HOSPITAL | Age: 61
LOS: 3 days | Discharge: HOME | DRG: 065 | End: 2021-09-09
Attending: STUDENT IN AN ORGANIZED HEALTH CARE EDUCATION/TRAINING PROGRAM | Admitting: EMERGENCY MEDICINE
Payer: COMMERCIAL

## 2021-09-06 ENCOUNTER — APPOINTMENT (OUTPATIENT)
Dept: MRI IMAGING | Facility: HOSPITAL | Age: 61
DRG: 065 | End: 2021-09-06
Attending: STUDENT IN AN ORGANIZED HEALTH CARE EDUCATION/TRAINING PROGRAM
Payer: COMMERCIAL

## 2021-09-06 ENCOUNTER — APPOINTMENT (OUTPATIENT)
Dept: CT IMAGING | Facility: HOSPITAL | Age: 61
DRG: 065 | End: 2021-09-06
Attending: STUDENT IN AN ORGANIZED HEALTH CARE EDUCATION/TRAINING PROGRAM
Payer: COMMERCIAL

## 2021-09-06 DIAGNOSIS — I63.89 ACUTE BRAINSTEM INFARCTION (H): ICD-10-CM

## 2021-09-06 DIAGNOSIS — I10 ESSENTIAL HYPERTENSION: ICD-10-CM

## 2021-09-06 DIAGNOSIS — R52 PAIN: Primary | ICD-10-CM

## 2021-09-06 DIAGNOSIS — I63.9 CEREBROVASCULAR ACCIDENT (CVA), UNSPECIFIED MECHANISM (H): ICD-10-CM

## 2021-09-06 LAB
AMPHETAMINES UR QL SCN: ABNORMAL
ANION GAP SERPL CALCULATED.3IONS-SCNC: 15 MMOL/L (ref 5–18)
APTT PPP: 26 SECONDS (ref 22–38)
BARBITURATES UR QL: ABNORMAL
BASOPHILS # BLD AUTO: 0.1 10E3/UL (ref 0–0.2)
BASOPHILS NFR BLD AUTO: 1 %
BENZODIAZ UR QL: ABNORMAL
BUN SERPL-MCNC: 14 MG/DL (ref 8–22)
CALCIUM SERPL-MCNC: 9.7 MG/DL (ref 8.5–10.5)
CANNABINOIDS UR QL SCN: ABNORMAL
CHLORIDE BLD-SCNC: 101 MMOL/L (ref 98–107)
CO2 SERPL-SCNC: 23 MMOL/L (ref 22–31)
COCAINE UR QL: ABNORMAL
CREAT BLD-MCNC: 1.3 MG/DL (ref 0.6–1.1)
CREAT SERPL-MCNC: 1.3 MG/DL (ref 0.6–1.1)
CREAT UR-MCNC: 69 MG/DL
EOSINOPHIL # BLD AUTO: 0 10E3/UL (ref 0–0.7)
EOSINOPHIL NFR BLD AUTO: 0 %
ERYTHROCYTE [DISTWIDTH] IN BLOOD BY AUTOMATED COUNT: 13.9 % (ref 10–15)
ETHANOL SERPL-MCNC: <10 MG/DL
GFR SERPL CREATININE-BSD FRML MDRD: 44 ML/MIN/1.73M2
GFR SERPL CREATININE-BSD FRML MDRD: 44 ML/MIN/1.73M2
GLUCOSE BLD-MCNC: 112 MG/DL (ref 70–125)
HCT VFR BLD AUTO: 37.3 % (ref 35–47)
HGB BLD-MCNC: 12.6 G/DL (ref 11.7–15.7)
HOLD SPECIMEN: NORMAL
HOLD SPECIMEN: NORMAL
IMM GRANULOCYTES # BLD: 0.1 10E3/UL
IMM GRANULOCYTES NFR BLD: 1 %
INR PPP: 1.16 (ref 0.9–1.15)
LYMPHOCYTES # BLD AUTO: 1.7 10E3/UL (ref 0.8–5.3)
LYMPHOCYTES NFR BLD AUTO: 15 %
MCH RBC QN AUTO: 30.7 PG (ref 26.5–33)
MCHC RBC AUTO-ENTMCNC: 33.8 G/DL (ref 31.5–36.5)
MCV RBC AUTO: 91 FL (ref 78–100)
METHADONE UR QL SCN: ABNORMAL
MONOCYTES # BLD AUTO: 0.6 10E3/UL (ref 0–1.3)
MONOCYTES NFR BLD AUTO: 5 %
NEUTROPHILS # BLD AUTO: 9.1 10E3/UL (ref 1.6–8.3)
NEUTROPHILS NFR BLD AUTO: 78 %
NRBC # BLD AUTO: 0 10E3/UL
NRBC BLD AUTO-RTO: 0 /100
OPIATES UR QL SCN: ABNORMAL
OXYCODONE UR QL: ABNORMAL
PCP UR QL SCN: ABNORMAL
PLATELET # BLD AUTO: 339 10E3/UL (ref 150–450)
POTASSIUM BLD-SCNC: 3.1 MMOL/L (ref 3.5–5)
RBC # BLD AUTO: 4.11 10E6/UL (ref 3.8–5.2)
SARS-COV-2 RNA RESP QL NAA+PROBE: NEGATIVE
SODIUM SERPL-SCNC: 139 MMOL/L (ref 136–145)
TROPONIN I SERPL-MCNC: 0.01 NG/ML (ref 0–0.29)
WBC # BLD AUTO: 11.5 10E3/UL (ref 4–11)

## 2021-09-06 PROCEDURE — C9803 HOPD COVID-19 SPEC COLLECT: HCPCS

## 2021-09-06 PROCEDURE — 83036 HEMOGLOBIN GLYCOSYLATED A1C: CPT | Performed by: EMERGENCY MEDICINE

## 2021-09-06 PROCEDURE — 120N000001 HC R&B MED SURG/OB

## 2021-09-06 PROCEDURE — 84484 ASSAY OF TROPONIN QUANT: CPT | Performed by: STUDENT IN AN ORGANIZED HEALTH CARE EDUCATION/TRAINING PROGRAM

## 2021-09-06 PROCEDURE — 36415 COLL VENOUS BLD VENIPUNCTURE: CPT | Performed by: STUDENT IN AN ORGANIZED HEALTH CARE EDUCATION/TRAINING PROGRAM

## 2021-09-06 PROCEDURE — 96374 THER/PROPH/DIAG INJ IV PUSH: CPT

## 2021-09-06 PROCEDURE — 82310 ASSAY OF CALCIUM: CPT | Performed by: STUDENT IN AN ORGANIZED HEALTH CARE EDUCATION/TRAINING PROGRAM

## 2021-09-06 PROCEDURE — 99223 1ST HOSP IP/OBS HIGH 75: CPT | Mod: AI | Performed by: EMERGENCY MEDICINE

## 2021-09-06 PROCEDURE — 82565 ASSAY OF CREATININE: CPT

## 2021-09-06 PROCEDURE — 82077 ASSAY SPEC XCP UR&BREATH IA: CPT | Performed by: STUDENT IN AN ORGANIZED HEALTH CARE EDUCATION/TRAINING PROGRAM

## 2021-09-06 PROCEDURE — 70551 MRI BRAIN STEM W/O DYE: CPT

## 2021-09-06 PROCEDURE — 87635 SARS-COV-2 COVID-19 AMP PRB: CPT | Performed by: STUDENT IN AN ORGANIZED HEALTH CARE EDUCATION/TRAINING PROGRAM

## 2021-09-06 PROCEDURE — 96361 HYDRATE IV INFUSION ADD-ON: CPT

## 2021-09-06 PROCEDURE — 70544 MR ANGIOGRAPHY HEAD W/O DYE: CPT

## 2021-09-06 PROCEDURE — 99285 EMERGENCY DEPT VISIT HI MDM: CPT | Mod: 25

## 2021-09-06 PROCEDURE — 93005 ELECTROCARDIOGRAM TRACING: CPT | Performed by: STUDENT IN AN ORGANIZED HEALTH CARE EDUCATION/TRAINING PROGRAM

## 2021-09-06 PROCEDURE — 250N000011 HC RX IP 250 OP 636: Performed by: STUDENT IN AN ORGANIZED HEALTH CARE EDUCATION/TRAINING PROGRAM

## 2021-09-06 PROCEDURE — 258N000003 HC RX IP 258 OP 636: Performed by: STUDENT IN AN ORGANIZED HEALTH CARE EDUCATION/TRAINING PROGRAM

## 2021-09-06 PROCEDURE — 80307 DRUG TEST PRSMV CHEM ANLYZR: CPT | Performed by: STUDENT IN AN ORGANIZED HEALTH CARE EDUCATION/TRAINING PROGRAM

## 2021-09-06 PROCEDURE — 85610 PROTHROMBIN TIME: CPT | Performed by: STUDENT IN AN ORGANIZED HEALTH CARE EDUCATION/TRAINING PROGRAM

## 2021-09-06 PROCEDURE — 85730 THROMBOPLASTIN TIME PARTIAL: CPT | Performed by: STUDENT IN AN ORGANIZED HEALTH CARE EDUCATION/TRAINING PROGRAM

## 2021-09-06 PROCEDURE — 70450 CT HEAD/BRAIN W/O DYE: CPT

## 2021-09-06 PROCEDURE — 85025 COMPLETE CBC W/AUTO DIFF WBC: CPT | Performed by: STUDENT IN AN ORGANIZED HEALTH CARE EDUCATION/TRAINING PROGRAM

## 2021-09-06 PROCEDURE — 80061 LIPID PANEL: CPT | Performed by: EMERGENCY MEDICINE

## 2021-09-06 RX ORDER — LORAZEPAM 2 MG/ML
1 INJECTION INTRAMUSCULAR ONCE
Status: COMPLETED | OUTPATIENT
Start: 2021-09-06 | End: 2021-09-06

## 2021-09-06 RX ORDER — LIDOCAINE 40 MG/G
CREAM TOPICAL
Status: DISCONTINUED | OUTPATIENT
Start: 2021-09-06 | End: 2021-09-09 | Stop reason: HOSPADM

## 2021-09-06 RX ORDER — SODIUM CHLORIDE, SODIUM LACTATE, POTASSIUM CHLORIDE, CALCIUM CHLORIDE 600; 310; 30; 20 MG/100ML; MG/100ML; MG/100ML; MG/100ML
125 INJECTION, SOLUTION INTRAVENOUS CONTINUOUS
Status: DISCONTINUED | OUTPATIENT
Start: 2021-09-06 | End: 2021-09-07

## 2021-09-06 RX ORDER — ASPIRIN 81 MG/1
81 TABLET, CHEWABLE ORAL DAILY
Status: DISCONTINUED | OUTPATIENT
Start: 2021-09-07 | End: 2021-09-09 | Stop reason: HOSPADM

## 2021-09-06 RX ORDER — ACETAMINOPHEN 325 MG/1
650 TABLET ORAL EVERY 4 HOURS PRN
Status: DISCONTINUED | OUTPATIENT
Start: 2021-09-06 | End: 2021-09-09 | Stop reason: HOSPADM

## 2021-09-06 RX ORDER — ASPIRIN 81 MG/1
324 TABLET, CHEWABLE ORAL ONCE
Status: COMPLETED | OUTPATIENT
Start: 2021-09-06 | End: 2021-09-07

## 2021-09-06 RX ORDER — IOPAMIDOL 755 MG/ML
75 INJECTION, SOLUTION INTRAVASCULAR ONCE
Status: DISCONTINUED | OUTPATIENT
Start: 2021-09-06 | End: 2021-09-06

## 2021-09-06 RX ORDER — ONDANSETRON 2 MG/ML
4 INJECTION INTRAMUSCULAR; INTRAVENOUS EVERY 6 HOURS PRN
Status: DISCONTINUED | OUTPATIENT
Start: 2021-09-06 | End: 2021-09-09 | Stop reason: HOSPADM

## 2021-09-06 RX ORDER — ONDANSETRON 4 MG/1
4 TABLET, ORALLY DISINTEGRATING ORAL EVERY 6 HOURS PRN
Status: DISCONTINUED | OUTPATIENT
Start: 2021-09-06 | End: 2021-09-09 | Stop reason: HOSPADM

## 2021-09-06 RX ORDER — LABETALOL HYDROCHLORIDE 5 MG/ML
10-20 INJECTION, SOLUTION INTRAVENOUS EVERY 10 MIN PRN
Status: DISCONTINUED | OUTPATIENT
Start: 2021-09-06 | End: 2021-09-09 | Stop reason: HOSPADM

## 2021-09-06 RX ORDER — HYDRALAZINE HYDROCHLORIDE 20 MG/ML
10-20 INJECTION INTRAMUSCULAR; INTRAVENOUS
Status: DISCONTINUED | OUTPATIENT
Start: 2021-09-06 | End: 2021-09-09 | Stop reason: HOSPADM

## 2021-09-06 RX ORDER — ASPIRIN 81 MG/1
81 TABLET ORAL DAILY
Status: DISCONTINUED | OUTPATIENT
Start: 2021-09-07 | End: 2021-09-09 | Stop reason: HOSPADM

## 2021-09-06 RX ADMIN — SODIUM CHLORIDE, POTASSIUM CHLORIDE, SODIUM LACTATE AND CALCIUM CHLORIDE 125 ML/HR: 600; 310; 30; 20 INJECTION, SOLUTION INTRAVENOUS at 22:54

## 2021-09-06 RX ADMIN — LORAZEPAM 1 MG: 2 INJECTION INTRAMUSCULAR; INTRAVENOUS at 20:40

## 2021-09-06 RX ADMIN — SODIUM CHLORIDE, POTASSIUM CHLORIDE, SODIUM LACTATE AND CALCIUM CHLORIDE 1000 ML: 600; 310; 30; 20 INJECTION, SOLUTION INTRAVENOUS at 20:30

## 2021-09-07 ENCOUNTER — APPOINTMENT (OUTPATIENT)
Dept: SPEECH THERAPY | Facility: HOSPITAL | Age: 61
DRG: 065 | End: 2021-09-07
Payer: COMMERCIAL

## 2021-09-07 ENCOUNTER — APPOINTMENT (OUTPATIENT)
Dept: PHYSICAL THERAPY | Facility: HOSPITAL | Age: 61
DRG: 065 | End: 2021-09-07
Attending: EMERGENCY MEDICINE
Payer: COMMERCIAL

## 2021-09-07 ENCOUNTER — APPOINTMENT (OUTPATIENT)
Dept: CARDIOLOGY | Facility: HOSPITAL | Age: 61
DRG: 065 | End: 2021-09-07
Attending: EMERGENCY MEDICINE
Payer: COMMERCIAL

## 2021-09-07 ENCOUNTER — APPOINTMENT (OUTPATIENT)
Dept: SPEECH THERAPY | Facility: HOSPITAL | Age: 61
DRG: 065 | End: 2021-09-07
Attending: EMERGENCY MEDICINE
Payer: COMMERCIAL

## 2021-09-07 ENCOUNTER — APPOINTMENT (OUTPATIENT)
Dept: RADIOLOGY | Facility: HOSPITAL | Age: 61
DRG: 065 | End: 2021-09-07
Attending: INTERNAL MEDICINE
Payer: COMMERCIAL

## 2021-09-07 ENCOUNTER — APPOINTMENT (OUTPATIENT)
Dept: ULTRASOUND IMAGING | Facility: HOSPITAL | Age: 61
DRG: 065 | End: 2021-09-07
Attending: PSYCHIATRY & NEUROLOGY
Payer: COMMERCIAL

## 2021-09-07 ENCOUNTER — APPOINTMENT (OUTPATIENT)
Dept: OCCUPATIONAL THERAPY | Facility: HOSPITAL | Age: 61
DRG: 065 | End: 2021-09-07
Attending: EMERGENCY MEDICINE
Payer: COMMERCIAL

## 2021-09-07 PROBLEM — I10 ESSENTIAL HYPERTENSION: Status: ACTIVE | Noted: 2021-09-07

## 2021-09-07 PROBLEM — E78.2 MIXED HYPERLIPIDEMIA: Status: ACTIVE | Noted: 2021-09-07

## 2021-09-07 PROBLEM — I63.89 ACUTE BRAINSTEM INFARCTION (H): Status: ACTIVE | Noted: 2021-09-06

## 2021-09-07 LAB
ANION GAP SERPL CALCULATED.3IONS-SCNC: 9 MMOL/L (ref 5–18)
ATRIAL RATE - MUSE: 63 BPM
BUN SERPL-MCNC: 11 MG/DL (ref 8–22)
CALCIUM SERPL-MCNC: 8.5 MG/DL (ref 8.5–10.5)
CHLORIDE BLD-SCNC: 104 MMOL/L (ref 98–107)
CHOLEST SERPL-MCNC: 217 MG/DL
CO2 SERPL-SCNC: 25 MMOL/L (ref 22–31)
CREAT SERPL-MCNC: 0.92 MG/DL (ref 0.6–1.1)
DIASTOLIC BLOOD PRESSURE - MUSE: NORMAL MMHG
ERYTHROCYTE [DISTWIDTH] IN BLOOD BY AUTOMATED COUNT: 13.8 % (ref 10–15)
GFR SERPL CREATININE-BSD FRML MDRD: 67 ML/MIN/1.73M2
GLUCOSE BLD-MCNC: 89 MG/DL (ref 70–125)
GLUCOSE BLDC GLUCOMTR-MCNC: 72 MG/DL (ref 70–125)
GLUCOSE BLDC GLUCOMTR-MCNC: 86 MG/DL (ref 70–125)
GLUCOSE BLDC GLUCOMTR-MCNC: 92 MG/DL (ref 70–125)
HBA1C MFR BLD: 5.2 %
HCT VFR BLD AUTO: 31.5 % (ref 35–47)
HDLC SERPL-MCNC: 55 MG/DL
HGB BLD-MCNC: 10.8 G/DL (ref 11.7–15.7)
INTERPRETATION ECG - MUSE: NORMAL
LDLC SERPL CALC-MCNC: 141 MG/DL
LVEF ECHO: NORMAL
MCH RBC QN AUTO: 30.8 PG (ref 26.5–33)
MCHC RBC AUTO-ENTMCNC: 34.3 G/DL (ref 31.5–36.5)
MCV RBC AUTO: 90 FL (ref 78–100)
P AXIS - MUSE: 86 DEGREES
PLATELET # BLD AUTO: 282 10E3/UL (ref 150–450)
POTASSIUM BLD-SCNC: 3.3 MMOL/L (ref 3.5–5)
POTASSIUM BLD-SCNC: 3.3 MMOL/L (ref 3.5–5)
POTASSIUM BLD-SCNC: 3.5 MMOL/L (ref 3.5–5)
POTASSIUM BLD-SCNC: 3.6 MMOL/L (ref 3.5–5)
PR INTERVAL - MUSE: 176 MS
QRS DURATION - MUSE: 80 MS
QT - MUSE: 480 MS
QTC - MUSE: 491 MS
R AXIS - MUSE: 83 DEGREES
RBC # BLD AUTO: 3.51 10E6/UL (ref 3.8–5.2)
SODIUM SERPL-SCNC: 138 MMOL/L (ref 136–145)
SYSTOLIC BLOOD PRESSURE - MUSE: NORMAL MMHG
T AXIS - MUSE: 63 DEGREES
TRIGL SERPL-MCNC: 103 MG/DL
VENTRICULAR RATE- MUSE: 63 BPM
WBC # BLD AUTO: 11.1 10E3/UL (ref 4–11)

## 2021-09-07 PROCEDURE — 36415 COLL VENOUS BLD VENIPUNCTURE: CPT | Performed by: EMERGENCY MEDICINE

## 2021-09-07 PROCEDURE — 97162 PT EVAL MOD COMPLEX 30 MIN: CPT | Mod: GP

## 2021-09-07 PROCEDURE — 250N000011 HC RX IP 250 OP 636: Performed by: EMERGENCY MEDICINE

## 2021-09-07 PROCEDURE — 97530 THERAPEUTIC ACTIVITIES: CPT | Mod: GP

## 2021-09-07 PROCEDURE — 92611 MOTION FLUOROSCOPY/SWALLOW: CPT | Mod: GN | Performed by: SPEECH-LANGUAGE PATHOLOGIST

## 2021-09-07 PROCEDURE — 250N000013 HC RX MED GY IP 250 OP 250 PS 637: Performed by: INTERNAL MEDICINE

## 2021-09-07 PROCEDURE — 93306 TTE W/DOPPLER COMPLETE: CPT | Mod: 26 | Performed by: INTERNAL MEDICINE

## 2021-09-07 PROCEDURE — 84132 ASSAY OF SERUM POTASSIUM: CPT | Performed by: INTERNAL MEDICINE

## 2021-09-07 PROCEDURE — 84132 ASSAY OF SERUM POTASSIUM: CPT | Performed by: EMERGENCY MEDICINE

## 2021-09-07 PROCEDURE — 74230 X-RAY XM SWLNG FUNCJ C+: CPT

## 2021-09-07 PROCEDURE — 97116 GAIT TRAINING THERAPY: CPT | Mod: GP

## 2021-09-07 PROCEDURE — 258N000003 HC RX IP 258 OP 636: Performed by: INTERNAL MEDICINE

## 2021-09-07 PROCEDURE — 92526 ORAL FUNCTION THERAPY: CPT | Mod: GN | Performed by: SPEECH-LANGUAGE PATHOLOGIST

## 2021-09-07 PROCEDURE — 99233 SBSQ HOSP IP/OBS HIGH 50: CPT | Performed by: INTERNAL MEDICINE

## 2021-09-07 PROCEDURE — 120N000001 HC R&B MED SURG/OB

## 2021-09-07 PROCEDURE — 97110 THERAPEUTIC EXERCISES: CPT | Mod: GO

## 2021-09-07 PROCEDURE — 93880 EXTRACRANIAL BILAT STUDY: CPT

## 2021-09-07 PROCEDURE — 258N000003 HC RX IP 258 OP 636: Performed by: STUDENT IN AN ORGANIZED HEALTH CARE EDUCATION/TRAINING PROGRAM

## 2021-09-07 PROCEDURE — 93306 TTE W/DOPPLER COMPLETE: CPT

## 2021-09-07 PROCEDURE — 99223 1ST HOSP IP/OBS HIGH 75: CPT | Performed by: PSYCHIATRY & NEUROLOGY

## 2021-09-07 PROCEDURE — 92610 EVALUATE SWALLOWING FUNCTION: CPT | Mod: GN | Performed by: SPEECH-LANGUAGE PATHOLOGIST

## 2021-09-07 PROCEDURE — 85027 COMPLETE CBC AUTOMATED: CPT | Performed by: EMERGENCY MEDICINE

## 2021-09-07 PROCEDURE — 36415 COLL VENOUS BLD VENIPUNCTURE: CPT | Performed by: INTERNAL MEDICINE

## 2021-09-07 PROCEDURE — 250N000013 HC RX MED GY IP 250 OP 250 PS 637: Performed by: EMERGENCY MEDICINE

## 2021-09-07 PROCEDURE — 97166 OT EVAL MOD COMPLEX 45 MIN: CPT | Mod: GO

## 2021-09-07 PROCEDURE — 250N000013 HC RX MED GY IP 250 OP 250 PS 637: Performed by: PSYCHIATRY & NEUROLOGY

## 2021-09-07 RX ORDER — POTASSIUM CHLORIDE 1500 MG/1
20 TABLET, EXTENDED RELEASE ORAL ONCE
Status: COMPLETED | OUTPATIENT
Start: 2021-09-07 | End: 2021-09-07

## 2021-09-07 RX ORDER — BARIUM SULFATE 400 MG/ML
SUSPENSION ORAL ONCE
Status: COMPLETED | OUTPATIENT
Start: 2021-09-07 | End: 2021-09-07

## 2021-09-07 RX ORDER — NAPROXEN SODIUM 220 MG
220 TABLET ORAL EVERY 12 HOURS PRN
Status: ON HOLD | COMMUNITY
End: 2021-09-09

## 2021-09-07 RX ORDER — POTASSIUM CHLORIDE 7.45 MG/ML
10 INJECTION INTRAVENOUS
Status: DISCONTINUED | OUTPATIENT
Start: 2021-09-07 | End: 2021-09-07 | Stop reason: ALTCHOICE

## 2021-09-07 RX ORDER — SODIUM CHLORIDE 9 MG/ML
INJECTION, SOLUTION INTRAVENOUS CONTINUOUS
Status: DISCONTINUED | OUTPATIENT
Start: 2021-09-07 | End: 2021-09-08 | Stop reason: ALTCHOICE

## 2021-09-07 RX ADMIN — SODIUM CHLORIDE, POTASSIUM CHLORIDE, SODIUM LACTATE AND CALCIUM CHLORIDE 125 ML/HR: 600; 310; 30; 20 INJECTION, SOLUTION INTRAVENOUS at 07:23

## 2021-09-07 RX ADMIN — POTASSIUM CHLORIDE 20 MEQ: 20 TABLET, EXTENDED RELEASE ORAL at 08:26

## 2021-09-07 RX ADMIN — TICAGRELOR 90 MG: 90 TABLET ORAL at 21:01

## 2021-09-07 RX ADMIN — BARIUM SULFATE 60 ML: 400 SUSPENSION ORAL at 14:40

## 2021-09-07 RX ADMIN — ASPIRIN 81 MG: 81 TABLET, COATED ORAL at 08:26

## 2021-09-07 RX ADMIN — BARIUM SULFATE 20 ML: 400 SUSPENSION ORAL at 14:39

## 2021-09-07 RX ADMIN — ENOXAPARIN SODIUM 40 MG: 40 INJECTION SUBCUTANEOUS at 08:27

## 2021-09-07 RX ADMIN — ONDANSETRON 4 MG: 2 INJECTION INTRAMUSCULAR; INTRAVENOUS at 19:03

## 2021-09-07 RX ADMIN — ONDANSETRON 4 MG: 2 INJECTION INTRAMUSCULAR; INTRAVENOUS at 08:26

## 2021-09-07 RX ADMIN — POTASSIUM CHLORIDE 20 MEQ: 20 TABLET, EXTENDED RELEASE ORAL at 17:22

## 2021-09-07 RX ADMIN — SODIUM CHLORIDE: 9 INJECTION, SOLUTION INTRAVENOUS at 12:15

## 2021-09-07 RX ADMIN — ASPIRIN 324 MG: 81 TABLET, CHEWABLE ORAL at 02:00

## 2021-09-07 RX ADMIN — TICAGRELOR 90 MG: 90 TABLET ORAL at 08:27

## 2021-09-07 ASSESSMENT — ACTIVITIES OF DAILY LIVING (ADL)
HEARING_DIFFICULTY_OR_DEAF: NO
TOILETING_ISSUES: NO
CONCENTRATING,_REMEMBERING_OR_MAKING_DECISIONS_DIFFICULTY: NO
FALL_HISTORY_WITHIN_LAST_SIX_MONTHS: YES
WEAR_GLASSES_OR_BLIND: OTHER (SEE COMMENTS)
PATIENT_/_FAMILY_COMMUNICATION_STYLE: SPOKEN LANGUAGE (ENGLISH OR BILINGUAL)
DIFFICULTY_COMMUNICATING: NO
DIFFICULTY_EATING/SWALLOWING: NO
NUMBER_OF_TIMES_PATIENT_HAS_FALLEN_WITHIN_LAST_SIX_MONTHS: 1
DRESSING/BATHING_DIFFICULTY: NO
WALKING_OR_CLIMBING_STAIRS_DIFFICULTY: NO

## 2021-09-07 ASSESSMENT — MIFFLIN-ST. JEOR: SCORE: 1113.36

## 2021-09-07 NOTE — ED NOTES
Pt has returned from MRI; she is drowsy from the Ativan but she is able to arouse to voice and can complete a neuro assessment with me.

## 2021-09-07 NOTE — PROGRESS NOTES
"   09/07/21 1049   General Information   Onset of Illness/Injury or Date of Surgery 09/06/21   Referring Physician Calos Williamson MD    Behavioral Issues difficulty managing stress;inappropriate language;disinhibition;overwhelmed easily;verbal outbursts   Patient/Family Therapy Goal Statement (SLP) Patient wants to continue drinking thin liquids.   Pertinent History of Current Problem Per MD note: \"60-year-old female who is visiting from [place] with history of HTN, HLD brought to the emergency room with acute onset of dizziness and weakness.  Although she presented within 4-1/2-hour window, due to subacute infarcts noted on CT scan she was felt not to be a candidate for tenecteplase.  Subsequently MRI scan confirmed bilateral subacute and acute infarcts. Multiple infarcts of varying ages in each cerebellar hemisphere. These are predominantly favored to be subacute in nature with a few scattered small acute infarcts in the left cerebellar hemisphere and left dorsal medulla. The remainder are chronic.\"   General Observations Patient at times emotionally labile (teary) when talking about family members who have passed away (nearing anniversaries). Patient reported feeling nauseated and not comfortable attempting PO trials of food textures. Patient reported left facial numbness improved today.   Past History of Dysphagia None per report   Type of Evaluation   Type of Evaluation Swallow Evaluation   Oral Motor   Oral Musculature anomalies present   Structural Abnormalities none present   Mucosal Quality dry   Dentition (Oral Motor)   Dentition (Oral Motor) natural dentition;poor condition   Facial Symmetry (Oral Motor)   Facial Symmetry (Oral Motor) WNL   Lip Function (Oral Motor)   Lip Range of Motion (Oral Motor) retraction impairment   Retraction, Lip Range of Motion left side;minimal impairment;moderate impairment   Lip Strength (Oral Motor) WFL   Tongue Function (Oral Motor)   Tongue ROM (Oral Motor) " protrusion is impaired   Protrusion, Tongue ROM Impairment (Oral Motor) minimal impairment   Tongue Strength (Oral Motor) WFL   Jaw Function (Oral Motor)   Jaw Function (Oral Motor) WNL   Cough/Swallow/Gag Reflex (Oral Motor)   Soft Palate/Velum (Oral Motor) unable/difficult to assess  (elevation observed; difficult to fully view ROM/symmetry)   Volitional Throat Clear/Cough (Oral Motor) impaired   Volitional Swallow (Oral Motor) mildly delayed   Vocal Quality/Secretion Management (Oral Motor)   Vocal Quality (Oral Motor) hoarse;breathy  (mild)   Secretion Management (Oral Motor) WNL  (patient reported difficulty yesterday)   General Swallowing Observations   Current Diet/Method of Nutritional Intake (General Swallowing Observations, NIS) NPO   Respiratory Support (General Swallowing Observations) none   Swallowing Evaluation Clinical swallow evaluation  (patient declined PO trials of solid textures due to nausea)   Clinical Swallow Evaluation   Feeding Assistance set up only required   Additional evaluation(s) completed today Recommended   Clinical Swallow Evaluation Textures Trialed thin liquids;mildly thick liquids   Clinical Swallow Eval: Thin Liquid Texture Trial   Mode of Presentation, Thin Liquids spoon;cup;straw;fed by clinician;self-fed   Volume of Liquid or Food Presented 3 oz thin water   Oral Phase of Swallow Poor AP movement;Premature pharyngeal entry  (suspected; variable)   Pharyngeal Phase of Swallow coughing/choking;reduction in laryngeal movement  (inconsistent coughing)   Clinical Swallow Eval: Mildly Thick Liquids   Mode of Presentation spoon   Volume Presented 3 sips   Oral Phase WFL   Pharyngeal Phase reduction in laryngeal movement   Esophageal Phase of Swallow   Esophageal comments Note brief episode of hiccups prior to oral intake today.   Swallowing Recommendations   Diet Consistency Recommendations NPO;ice chips only  (limited ice chips as tolerated for comfort)   Supervision Level for  Intake 1:1 supervision needed   Recommended Feeding/Eating Techniques (Swallow Eval) maintain upright sitting position for eating   Medication Administration Recommendations, Swallowing (SLP) Alternate route   SLP Therapy Assessment/Plan   Criteria for Skilled Therapeutic Interventions Met (SLP Eval) yes;treatment indicated   SLP Diagnosis Moderate-severe oropharyngeal dysphagia   Rehab Potential (SLP Eval) good, to achieve stated therapy goals   Therapy Frequency (SLP Eval) 5 times/wk   Predicted Duration of Therapy Intervention (SLP Eval) 2 weeks   Comment, Therapy Assessment/Plan (SLP) Patient presents with moderate-severe oropharyngeal dysphagia characterized by mild oral motor impairment, mild-moderate oral bolus incoordination, delayed swallow response, reduced laryngeal elevation upon palpation and inconsistent aspiration signs with sips of thin liquid. No overt aspiration signs occurred with small sips of mildly thick water by spoon. Patient declined PO intake of applesauce or other solid texture due to nausea. Note intermittent strong coughing episodes with sips of thin liquid. Given inconsistent aspiration signs at bedside in context of brainstem infarct, recommend further instrumental swallow assessment prior to initiating oral diet due to risk for silent aspiration. Recommend NPO until VFSS completed. Will make further recommendations pending results.   Therapy Plan Review/Discharge Plan (SLP)   Therapy Plan Review (SLP) evaluation/treatment results reviewed;care plan/treatment goals reviewed;risks/benefits reviewed;current/potential barriers reviewed;participants voiced agreement with care plan;participants included;patient   Demonstrates Need for Referral to Another Service (SLP) occupational therapist;physical therapist   SLP Discharge Planning    SLP Discharge Recommendation (DC Rec) Acute Rehab Center-Motivated patient will benefit from intensive, interdisciplinary therapy.  Anticipate will be able  to tolerate 3 hours of therapy per day   SLP Rationale for DC Rec Patient below baseline function for swallow and currently NPO status.   SLP Brief overview of current status  Recommend video swallow study to further assess oropharyngeal swallow function and risk for silent aspiration. Recommend continue NPO for now with alternate route for medications.    Total Evaluation Time   Total Evaluation Time (Minutes) 15

## 2021-09-07 NOTE — H&P
"ADMISSION HISTORY & PHYSICAL      No primary care provider on file., None  ASSESSMENT AND PLAN:  60 year old female presenting with:    1.  Acute versus subacute CVA involving the left cerebellar hemisphere.  MRI/MRA showed multiple infarcts of varying ages in each cerebellar hemisphere.  These are predominantly favored to be subacute with a few scattered small acute infarcts in the left cerebellar hemisphere and left dorsal medulla.  The remainder are chronic.  There is a small amount of presumed chronic hemorrhage in the left cerebellar hemisphere.  Lytics not given due to unknown age of multiple infarcts.  Neurology consulted through the ED.  Started on aspirin in the ED. Neuro telemetry ordered.    Addendum: I went and saw her later after she woke up more and she states that she wanted to go home.  I explained to her the importance of staying in the hospital for further work-up and treatment.  She states that she lives in a hotel that she needs to \"check out of \".  She states she really has no family and that she has a hair appointment she needs to cancel tomorrow.  She then started to cry saying \"I want my momma\".  We really have no background information on her.  Will consult , may need psychiatry consult at some point, her med rec is not complete.  U tox is positive for THC.  No clear evidence of alcohol and blood alcohol level is less than 10.    2.  Hypertension: Awaiting med rec, will allow permissive hypertension    3.  Hypokalemia: Awaiting med rec, will replace potassium    4.  BISI: Awaiting med rec, creatinine 1.3 with unknown baseline, will trend.    Barriers to discharge: CVA      CHIEF COMPLAINT:  Slurred speech/vertigo/left-sided weakness.    HISTORY OF PRESENTING ILLNESS:  Yoana Weeks is a 60 year old female with history significant for hypertension and hypercholesterolemia.  Patient was given Ativan prior to MRI and has been sedated since then and unable to give history.  She " arouses to voice and opens her eyes but falls right back to sleep.  [place] and dropped with a multi she was drinking.  She also had trouble getting out of the bathroom due to vertigo and EMS was called and EMS reported some slurred speech and at one point weakness in the left arm.  After arrival to the ED she was alert and oriented with no obvious neuro deficits except nystagmus and some left arm dysmetria.  Stroke code was called and ultimately canceled secondary to uncertain time of onset after consultation with neurology.  She may have had similar symptoms 2 weeks ago.  She was given aspirin in the ED.  She was given Ativan prior to MRI and has been drowsy but arousable since.    PMH/PSH:  Patient Active Problem List   Diagnosis     Cerebrovascular accident (CVA), unspecified mechanism (H)       ALLERGIES:  Not on File    MEDICATIONS:  Reviewed.  Current Facility-Administered Medications   Medication     lactated ringers infusion     No current outpatient medications on file.       SOCIAL HISTORY:  Social History     Socioeconomic History     Marital status: Not on file     Spouse name: Not on file     Number of children: Not on file     Years of education: Not on file     Highest education level: Not on file   Occupational History     Not on file   Tobacco Use     Smoking status: Not on file   Substance and Sexual Activity     Alcohol use: Not on file     Drug use: Not on file     Sexual activity: Not on file   Other Topics Concern     Not on file   Social History Narrative     Not on file     Social Determinants of Health     Financial Resource Strain:      Difficulty of Paying Living Expenses:    Food Insecurity:      Worried About Running Out of Food in the Last Year:      Ran Out of Food in the Last Year:    Transportation Needs:      Lack of Transportation (Medical):      Lack of Transportation (Non-Medical):    Physical Activity:      Days of  Exercise per Week:      Minutes of Exercise per Session:    Stress:      Feeling of Stress :    Social Connections:      Frequency of Communication with Friends and Family:      Frequency of Social Gatherings with Friends and Family:      Attends Spiritism Services:      Active Member of Clubs or Organizations:      Attends Club or Organization Meetings:      Marital Status:    Intimate Partner Violence:      Fear of Current or Ex-Partner:      Emotionally Abused:      Physically Abused:      Sexually Abused:        FAMILY HISTORY:  Reviewed and found to be not pertinent to presenting complaint    ROS:  12 point ROS was performed and found to be negative except for the pertinent positives mentioned in the HPI.      PHYSICAL EXAM:  BP (!) 159/79   Pulse 64   Temp 98.8  F (37.1  C) (Oral)   Resp 22   Wt 55.1 kg (121 lb 6.4 oz)   SpO2 98%   I/O last 3 completed shifts:  In: 1000 [IV Piggyback:1000]  Out: -   No intake/output data recorded.  CONSTITUTIONAL: No apparent distress  HEENT: Atraumatic      Sclera- anicteric      Mucous membrane- moist and pink  LUNGS: Clear to auscultation bilaterally  CARDIOVASCULAR: S1S2 regular. No murmurs, rubs or gallops,no pedal edema  GI: Soft. Non-tender, non-distended. No organomegaly. No guarding or rigidity. Bowel sounds- active  NEURO: Cranial nerves II-XII grossly intact. No focal neurological deficit. No involuntary movements  INTGM: No skin rash, no cyanosis or clubbing  LYMPH: no adenopathy  MSK: no joint swelling or tenderness  PSYCH: Sleeping and arousable to voice but falls right back to sleep       DIAGNOSTIC DATA:  Recent Results (from the past 24 hour(s))   Creatinine POCT    Collection Time: 09/06/21  7:38 PM   Result Value Ref Range    Creatinine POCT 1.3 (H) 0.6 - 1.1 mg/dL    GFR, ESTIMATED POCT 44 (L) >60 mL/min/1.73m2   Ethyl Alcohol Level    Collection Time: 09/06/21  7:44 PM   Result Value Ref Range    Alcohol, Blood <10 None detected mg/dL   Basic  metabolic panel    Collection Time: 09/06/21  7:44 PM   Result Value Ref Range    Sodium 139 136 - 145 mmol/L    Potassium 3.1 (L) 3.5 - 5.0 mmol/L    Chloride 101 98 - 107 mmol/L    Carbon Dioxide (CO2) 23 22 - 31 mmol/L    Anion Gap 15 5 - 18 mmol/L    Urea Nitrogen 14 8 - 22 mg/dL    Creatinine 1.30 (H) 0.60 - 1.10 mg/dL    Calcium 9.7 8.5 - 10.5 mg/dL    Glucose 112 70 - 125 mg/dL    GFR Estimate 44 (L) >60 mL/min/1.73m2   INR    Collection Time: 09/06/21  7:44 PM   Result Value Ref Range    INR 1.16 (H) 0.90 - 1.15   Partial thromboplastin time    Collection Time: 09/06/21  7:44 PM   Result Value Ref Range    aPTT 26 22 - 38 Seconds   Troponin I    Collection Time: 09/06/21  7:44 PM   Result Value Ref Range    Troponin I 0.01 0.00 - 0.29 ng/mL   CBC with platelets and differential    Collection Time: 09/06/21  7:44 PM   Result Value Ref Range    WBC Count 11.5 (H) 4.0 - 11.0 10e3/uL    RBC Count 4.11 3.80 - 5.20 10e6/uL    Hemoglobin 12.6 11.7 - 15.7 g/dL    Hematocrit 37.3 35.0 - 47.0 %    MCV 91 78 - 100 fL    MCH 30.7 26.5 - 33.0 pg    MCHC 33.8 31.5 - 36.5 g/dL    RDW 13.9 10.0 - 15.0 %    Platelet Count 339 150 - 450 10e3/uL    % Neutrophils 78 %    % Lymphocytes 15 %    % Monocytes 5 %    % Eosinophils 0 %    % Basophils 1 %    % Immature Granulocytes 1 %    NRBCs per 100 WBC 0 <1 /100    Absolute Neutrophils 9.1 (H) 1.6 - 8.3 10e3/uL    Absolute Lymphocytes 1.7 0.8 - 5.3 10e3/uL    Absolute Monocytes 0.6 0.0 - 1.3 10e3/uL    Absolute Eosinophils 0.0 0.0 - 0.7 10e3/uL    Absolute Basophils 0.1 0.0 - 0.2 10e3/uL    Absolute Immature Granulocytes 0.1 (H) <=0.0 10e3/uL    Absolute NRBCs 0.0 10e3/uL   Extra Red Top Tube    Collection Time: 09/06/21  7:46 PM   Result Value Ref Range    Hold Specimen JIC    Extra Green Top (Lithium Heparin) Tube    Collection Time: 09/06/21  7:46 PM   Result Value Ref Range    Hold Specimen JIC      All lab studies reviewed personally    MRA Brain ([place]) wo  Contrast    Result Date: 9/6/2021  EXAM: MR BRAIN FOR STROKE LIMITED, MRA BRAIN ([place] [facility]) WO CONTRAST LOCATION: [facility] DATE/TIME: 9/6/2021 8:08 PM INDICATION: Acute vertigo, nystagmus, dysmetria left arm COMPARISON: Head CT earlier today TECHNIQUE: 1) Routine multiplanar multisequence head MRI without intravenous contrast. 2) 3D time-of-flight head MRA without intravenous contrast. FINDINGS: HEAD MRI: INTRACRANIAL CONTENTS: Examination is limited by motion despite repeat attempts. There are multiple scattered infarcts in each cerebellar hemisphere of varying ages. Several of these are chronic. There are multiple diffusion hyperintense infarcts but do not demonstrate ADC hypointensity. These demonstrate FLAIR hyperintensity and are favored to be subacute in nature. There are a few small scattered acute appearing infarcts in the left cerebral hemisphere. An additional small acute appearing infarcts in the left dorsal medulla. Small amount of likely chronic hemorrhage in the left cerebellar hemisphere. No acute hemorrhage or mass effect. Mild volume loss and moderate burden presumed chronic small vessel ischemia again noted. Normal position of the cerebellar tonsils. HEAD MRA: Limited by motion. No proximal occlusion of the [place]. Fetal origin left posterior cerebral artery.     IMPRESSION: HEAD MRI: 1.  Multiple infarcts of varying ages in each cerebellar hemisphere. These are predominantly favored to be subacute in nature with a few scattered small acute infarcts in the left cerebellar hemisphere and left dorsal medulla. The remainder are chronic. No mass effect or space occupying hemorrhage. 2.  Small amount of presumed chronic hemorrhage in the left cerebellar hemisphere. 3.  Age-related changes. HEAD MRA: 1.  Limited by motion. No proximal occlusion of the [place]. Findings were discussed with Dr. Castaneda at 2042 09/06/2021.    MR Brain for Stroke  Limited    Result Date: 9/6/2021  EXAM: MR BRAIN FOR STROKE LIMITED, MRA BRAIN ([place] [facility]) WO CONTRAST LOCATION: [facility] DATE/TIME: 9/6/2021 8:08 PM INDICATION: Acute vertigo, nystagmus, dysmetria left arm COMPARISON: Head CT earlier today TECHNIQUE: 1) Routine multiplanar multisequence head MRI without intravenous contrast. 2) 3D time-of-flight head MRA without intravenous contrast. FINDINGS: HEAD MRI: INTRACRANIAL CONTENTS: Examination is limited by motion despite repeat attempts. There are multiple scattered infarcts in each cerebellar hemisphere of varying ages. Several of these are chronic. There are multiple diffusion hyperintense infarcts but do not demonstrate ADC hypointensity. These demonstrate FLAIR hyperintensity and are favored to be subacute in nature. There are a few small scattered acute appearing infarcts in the left cerebral hemisphere. An additional small acute appearing infarcts in the left dorsal medulla. Small amount of likely chronic hemorrhage in the left cerebellar hemisphere. No acute hemorrhage or mass effect. Mild volume loss and moderate burden presumed chronic small vessel ischemia again noted. Normal position of the cerebellar tonsils. HEAD MRA: Limited by motion. No proximal occlusion of the [place]. Fetal origin left posterior cerebral artery.     IMPRESSION: HEAD MRI: 1.  Multiple infarcts of varying ages in each cerebellar hemisphere. These are predominantly favored to be subacute in nature with a few scattered small acute infarcts in the left cerebellar hemisphere and left dorsal medulla. The remainder are chronic. No mass effect or space occupying hemorrhage. 2.  Small amount of presumed chronic hemorrhage in the left cerebellar hemisphere. 3.  Age-related changes. HEAD MRA: 1.  Limited by motion. No proximal occlusion of the [place]. Findings were discussed with Dr. Castaneda at 2042 09/06/2021.    CT Head w/o Contrast    Result  Date: 9/6/2021  EXAM: CT HEAD W/O CONTRAST LOCATION: [facility] DATE/TIME: 9/6/2021 7:34 PM INDICATION: Code stroke to evaluate for potential thrombolysis and thrombectomy. COMPARISON: None. TECHNIQUE: Routine CT Head without IV contrast. Multiplanar reformats. Dose reduction techniques were used. FINDINGS: INTRACRANIAL CONTENTS: No intracranial hemorrhage, extraaxial collection, or mass effect. Patchy areas of chronic ischemia within each cerebellar hemisphere. Superimposed within the left cerebellar hemisphere, there appears to be scattered mild to moderate burden of patchy likely more recent, acute to subacute infarcts; however, this is somewhat poorly evaluated by streak artifact. Mild volume loss and moderate burden presumed chronic small vessel ischemia. VISUALIZED ORBITS/SINUSES/MASTOIDS: No intraorbital abnormality. No paranasal sinus mucosal disease. No middle ear or mastoid effusion. BONES/SOFT TISSUES: No acute abnormality.     IMPRESSION: 1.  Patchy areas of low attenuation in the left cerebellar hemisphere are somewhat poorly evaluated due to streak artifact, though may represent acute to early subacute ischemia. These are superimposed on mild burden scattered chronic bilateral cerebellar infarcts. 2.  Mild volume loss and moderate burden presumed chronic small vessel ischemia. 3.  No hemorrhage. Findings discussed Dr. Castaneda at 1936 09/06/2021.    Radiology report reviewed.        Darin Williamson MD  [facility]

## 2021-09-07 NOTE — PLAN OF CARE
Problem: Functional Ability Impaired (Stroke, Hemorrhagic)  Goal: Optimal Functional Ability  Intervention: Optimize Functional Ability  Recent Flowsheet Documentation  Taken 9/7/2021 0100 by Rachelle Poe RN  Activity Management: activity adjusted per tolerance  Pt denies pain. BP elevated, order to give prn if SBP >220. Attempted to get up with assist of 2 but was feeling dizzy. Pure wick in place for voiding. Pt scored 1 on NIH due to L face numbness. No slurring or drooping. Alert and oriented x4. Neurology consult today. Echocardiogram ordered. NSR on tele.

## 2021-09-07 NOTE — PHARMACY-ADMISSION MEDICATION HISTORY
Pharmacy Note - Admission Medication History    Pertinent Provider Information: None     ______________________________________________________________________    Prior To Admission (PTA) med list completed and updated in EMR.       PTA Med List   Medication Sig Last Dose     esomeprazole (NEXIUM) 20 MG DR capsule Take 20 mg by mouth daily as needed Take 30-60 minutes before eating. 9/6/2021     naproxen sodium (ANAPROX) 220 MG tablet Take 220 mg by mouth every 12 hours as needed for moderate pain Past Week       Information source(s): Patient  Method of interview communication: phone    Summary of Changes to PTA Med List  New: Nexium Aleve  Discontinued: n/a  Changed: n/a    Patient was asked about OTC/herbal products specifically.  PTA med list reflects this.    Allergies were reviewed, assessed, and updated with the patient.      Patient does not use any multi-dose medications prior to admission.    The information provided in this note is only as accurate as the sources available at the time of the update(s).    Thank you for the opportunity to participate in the care of this patient.    Rossana Vizcaino [facility]  9/7/2021 8:39 AM

## 2021-09-07 NOTE — PLAN OF CARE
Problem: Adult Inpatient Plan of Care  Goal: Plan of Care Review  Outcome: No Change     Problem: Swallowing Impairment (Stroke, Hemorrhagic)  Goal: Oral Intake without Aspiration  Outcome: No Change     Problem: Nausea and Vomiting  Goal: Fluid and Electrolyte Balance  Outcome: No Change     Pt denied pain all shift.  Pt very dizzy and light headed with any movement.  Emesis x 3 this shift of yellow bile.  Gave zofran per prn order x 1.  Pt went down for video swallow study and ultra sound.  Place purewick due to weakness and dizziness.  Awaiting results.

## 2021-09-07 NOTE — PROGRESS NOTES
09/07/21 1140   Quick Adds   Type of Visit Initial PT Evaluation   Living Environment   People in home alone   Current Living Arrangements house   Home Accessibility stairs within home  (0 SHAYNE)   Number of Stairs, Within Home, Primary greater than 10 stairs  (Flight to bedroom per pt)   Stair Railings, Within Home, Primary railing on right side (ascending)   Living Environment Comments Pt visiting [place] from [place].    Self-Care   Usual Activity Tolerance good   Current Activity Tolerance fair   Equipment Currently Used at Home none   Disability/Function   Hearing Difficulty or Deaf no   Wear Glasses or Blind other (see comments)  (cheaters)   Concentrating, Remembering or Making Decisions Difficulty yes   Difficulty Communicating no   Difficulty Eating/Swallowing no   Walking or Climbing Stairs Difficulty yes   Fall history within last six months yes  (Reports falling at home due to heat.)   General Information   Onset of Illness/Injury or Date of Surgery 09/06/21   Referring Physician Dr Patricio Brooks   Patient/Family Therapy Goals Statement (PT) None stated   Pertinent History of Current Problem (include personal factors and/or comorbidities that impact the POC) Admit for B cerebellar and L medulla infarction   Existing Precautions/Restrictions fall   Weight-Bearing Status - LLE full weight-bearing   Weight-Bearing Status - RLE full weight-bearing   Cognition   Affect/Mental Status (Cognition) emotionally labile;sad/depressed affect;anxious   Follows Commands (Cognition) follows one-step commands;50-74% accuracy;increased processing time needed;physical/tactile prompts required;repetition of directions required;verbal cues/prompting required   Behavioral Issues difficulty managing stress;overwhelmed easily   Safety Deficit (Cognition) impulsivity   Cognitive Status Comments Pt becomes easily upset, emotional and starts crying. Impulsive and difficult to keep on task   Pain Assessment   Patient Currently in Pain  No   Strength   Strength Comments Unable to formally test due to pt very emotional, difficult to stay on task at times.   Bed Mobility   Bed Mobility Limitations decreased ability to use legs for bridging/pushing;impaired ability to control trunk for mobility   Impairments Contributing to Impaired Bed Mobility impaired balance;impaired coordination;impaired postural control   Assistive Device (Bed Mobility) bed rails   Comment (Bed Mobility) Sit<>supine CGA but slow, taxing effort.   Transfers   Transfer Safety Concerns Noted stepping too close to front of assistive device;decreased weight-shifting ability   Impairments Contributing to Impaired Transfers impaired balance;impaired postural control;decreased strength   Transfer Safety Comments Sit<>stand from edge of bed to FWW min A of 1.   Gait/Stairs (Locomotion)   [place] Level (Gait) maximum assist (25% patient effort);verbal cues   Assistive Device (Gait) walker, front-wheeled   Distance in Feet (Required for LE Total Joints) 100'x1   Pattern (Gait) step-to   Deviations/Abnormal Patterns (Gait) left sided deviations;ataxic;base of support, narrow;gait speed decreased;scissoring;stride length decreased   Left Sided Gait Deviations heel strike decreased   Comment (Gait/Stairs) Pt leans heavily to left along with stepping too far out in front of FWW.   Balance   Balance other (describe)   Sitting Balance: Static fair balance  (Leans heavily to left)   Standing Balance: Static fair balance   Balance Comments Leans heavily to left   Clinical Impression   Criteria for Skilled Therapeutic Intervention yes, treatment indicated   PT Diagnosis (PT) Impaired functional mobility   Influenced by the following impairments weakness, fatigue   Functional limitations due to impairments bed mobility, transfers, gait   Clinical Presentation Evolving/Changing   Clinical Presentation Rationale Presents as medically diagnosed   Clinical Decision Making (Complexity) moderate  complexity   Therapy Frequency (PT) Daily   Predicted Duration of Therapy Intervention (days/wks) 7 days   Planned Therapy Interventions (PT) bed mobility training;gait training;neuromuscular re-education;strengthening;transfer training   Anticipated Equipment Needs at Discharge (PT) walker, rolling   Risk & Benefits of therapy have been explained evaluation/treatment results reviewed;care plan/treatment goals reviewed;participants included;patient;friend   PT Discharge Planning    PT Discharge Recommendation (DC Rec) [facility] patient will benefit from intensive, interdisciplinary therapy.  Anticipate will be able to tolerate 3 hours of therapy per day   PT Rationale for DC Rec CVA with L side weakness resulting in impaired functional mobility   Total Evaluation Time   Total Evaluation Time (Minutes) 15   Coping Strategies   Trust Relationship/Rapport care explained;questions answered

## 2021-09-07 NOTE — ED NOTES
Pt is in CT and then will be taken to MRI with swat nurse at bedside and with continuous cardiac monitoring.

## 2021-09-07 NOTE — CONSULTS
NEUROLOGY INPATIENT CONSULTATION NOTE       [facility] NEUROLOGY, [place]  [address] [place] [address]  Tel: [phone]  Fax: [phone]  [email or web address]     Yoana Weeks  1960, MRN [id number]  PCP: No primary care provider on file.  Date: 2021     ASSESSMENT & PLAN     Diagnosis code: Brainstem infarction    Bilateral brainstem infarction  60-year-old female who is visiting from [place] with history of HTN, HLD brought to the emergency room with acute onset of dizziness and weakness.  Although she presented within 4-1/2-hour window, due to subacute infarcts noted on CT scan she was felt not to be a candidate for tenecteplase.  Subsequently MRI scan confirmed bilateral subacute and acute infarcts    Neurochecks every 2 hours for the first 24 hours and afterwards per protocol.    NPO until bedside swallow evaluation. Advance diet per Speech Therapy's recommendations    Keep SBP<220 and DBP<110    Dual antiplatelet therapy with aspirin and Brilinta.  After 30 days she can continue on aspirin monotherapy    Maintain normothermia and normoglycemia.Hyperglycemia promotes anaerobic glycolysis and lactic acidosis in ischemic tissue which increases the risk of hemorrhage especially after thrombolysis.    Telemetry to monitor for cardiac arrythmia    IV hydration 0.9% NaCl at 100 mL/hr    Head MRI scan shows multiple infarcts in bilateral cerebellar hemisphere that appears subacute.  There was acute infarct in the left cerebellar hemisphere and the left dorsal medulla.  Head MRI was degraded due to motion artifact but no proximal occlusion of [place]    Echocardiogram to rule out cardiac source of emboli    Carotid ultrasound    LDL is elevated, start Lipitor 20 mg daily with goal of LDL less than 70    DVT prophylaxis with sequential compression device and subcu heparin.    Physical, Occupational & Speech therapy consults    Stroke education provided.  Patient is  visiting from [place] and is insisting on leaving    Thank you again for this referral, please feel free to contact me if you have any questions.    Tobin Noyola MD  [facility]  (Formerly, [facility]     CHIEF COMPLAINT Acute brainstem infarction (H)     HISTORY OF PRESENT ILLNESS     We have been requested by Dr. Brooks to evaluate Yoana Weeks who is a 60 year old  female for brainstem infarction    Patient is a 60-year-old female who is visiting from [place] with history of HTN, HLD who was brought to the emergency room when she developed sudden vertigo and had difficulty holding onto objects.  She was at the [facility] and suddenly developed dizziness, slurred speech and left-sided facial numbness.  She called paramedics and was brought to the emergency room.  Initially, stroke code was called and she had a CT of the head that showed patchy areas of low-attenuation in the left cerebellar hemisphere that were poorly evaluated due to streak artifact and question of subacute infarct was raised.  Although her symptoms suggested brainstem CVA due to changes noted on CT scan and patient being unreliable historian decision was made to hold off on tenecteplase.  Subsequently, she had MRI of the brain that showed multiple infarcts in both cerebellar hemisphere that appeared subacute and a more acute infarct in the left cerebellar hemisphere and the left dorsal medulla.  Head MRA showed no occlusion of [place] lab work included electrolyte panel that showed hypokalemia, creatinine 1.3, normal troponin, elevated white count and a LDL of 141.  Urine toxicology screen was negative patient was admitted for further evaluation.  According to patient while in [place] she had a fall and suddenly fell down to the ground.  Paramedics were called but no intervention was done and she was not taken to the hospital.     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    Past Surgical History: She  has no past surgical history on file.     SOCIAL HISTORY     Reviewed, and she  reports that she has been smoking cigarettes. She has been smoking about 0.50 packs per day. She uses smokeless tobacco. She reports that she does not use drugs.     FAMILY HISTORY     Reviewed, and family history includes Cerebrovascular Disease in her father and maternal grandmother.     ALLERGIES     Allergies   Allergen Reactions     Nuts Anaphylaxis     Contrast Dye Hives        REVIEW OF SYSTEMS     Pertinent items are noted in HPI.     HOME & HOSPITAL MEDICATIONS     Prior to Admission Medications  Medications Prior to Admission   Medication Sig Dispense Refill Last Dose     esomeprazole (NEXIUM) 20 MG DR capsule Take 20 mg by mouth daily as needed Take 30-60 minutes before eating.   9/6/2021     naproxen sodium (ANAPROX) 220 MG tablet Take 220 mg by mouth every 12 hours as needed for moderate pain   Past Week       Hospital Medications    aspirin  81 mg Oral Daily    Or     aspirin  81 mg Oral or NG Tube Daily     enoxaparin ANTICOAGULANT  40 mg Subcutaneous Q24H     sodium chloride (PF)  3 mL Intracatheter Q8H     ticagrelor  90 mg Oral BID        PHYSICAL EXAM     Vital signs  Temp:  [98.3  F (36.8  C)-98.8  F (37.1  C)] 98.4  F (36.9  C)  Pulse:  [59-75] 62  Resp:  [12-24] 16  BP: (159-205)/() 180/87  SpO2:  [93 %-98 %] 98 %    Weight:   Wt Readings from Last 1 Encounters:   09/07/21 53.7 kg (118 lb 4.8 oz)        General Physical Exam: Patient is alert and oriented x 3. Vital signs were reviewed and are documented in EMR. Neck was supple, no carotid bruit, thyromegaly, JVD  or lymphadenopathy noted.  Neurological Exam:  Mental status: Alert and oriented x3 somewhat anxious  Speech: Normal with no dysarthria or aphasia  Cranial nerves: Extraocular movements are intact.  She has decreased light touch pinprick on the left half of the face  Motor: Decreased mass with normal tone 5/5 strength  Reflexes: 1+ in biceps triceps absent at brachioradialis knees and ankles toes downgoing  Sensory: Sensation intact to light touch pinprick in the extremities, decreased on the left side of the face  Coordination: Dysmetria on left finger-nose testing and heel-knee-shin normal on the right  Gait: Deferred for safety     DIAGNOSTIC STUDIES     Pertinent Radiology   Radiology Results: Reviewed impression and images     CT  1.  Patchy areas of low attenuation in the left cerebellar hemisphere are somewhat poorly evaluated due to streak artifact, though may represent acute to early subacute ischemia. These are superimposed on mild burden scattered chronic bilateral   cerebellar infarcts.     2.  Mild volume loss and moderate burden presumed chronic small vessel ischemia.     3.  No hemorrhage.     MRI  HEAD MRI:   1.  Multiple infarcts of varying ages in each cerebellar hemisphere. These are predominantly favored to be subacute in nature with a few scattered small acute infarcts in the left cerebellar hemisphere and left dorsal medulla. The remainder are chronic.   No mass effect or space occupying hemorrhage.     2.  Small amount of presumed chronic hemorrhage in the left cerebellar hemisphere.     3.  Age-related changes.     HEAD MRA:   1.  Limited by motion. No proximal occlusion of the [place] of [place].     Pertinent Labs   Lab Results: Personally Reviewed   Recent Results (from the past 24 hour(s))   Creatinine POCT    Collection Time: 09/06/21  7:38 PM   Result Value Ref Range    Creatinine POCT 1.3 (H) 0.6 - 1.1 mg/dL    GFR, ESTIMATED POCT 44 (L) >60 mL/min/1.73m2   Ethyl Alcohol Level     Collection Time: 09/06/21  7:44 PM   Result Value Ref Range    Alcohol, Blood <10 None detected mg/dL   Basic metabolic panel    Collection Time: 09/06/21  7:44 PM   Result Value Ref Range    Sodium 139 136 - 145 mmol/L    Potassium 3.1 (L) 3.5 - 5.0 mmol/L    Chloride 101 98 - 107 mmol/L    Carbon Dioxide (CO2) 23 22 - 31 mmol/L    Anion Gap 15 5 - 18 mmol/L    Urea Nitrogen 14 8 - 22 mg/dL    Creatinine 1.30 (H) 0.60 - 1.10 mg/dL    Calcium 9.7 8.5 - 10.5 mg/dL    Glucose 112 70 - 125 mg/dL    GFR Estimate 44 (L) >60 mL/min/1.73m2   INR    Collection Time: 09/06/21  7:44 PM   Result Value Ref Range    INR 1.16 (H) 0.90 - 1.15   Partial thromboplastin time    Collection Time: 09/06/21  7:44 PM   Result Value Ref Range    aPTT 26 22 - 38 Seconds   Troponin I    Collection Time: 09/06/21  7:44 PM   Result Value Ref Range    Troponin I 0.01 0.00 - 0.29 ng/mL   CBC with platelets and differential    Collection Time: 09/06/21  7:44 PM   Result Value Ref Range    WBC Count 11.5 (H) 4.0 - 11.0 10e3/uL    RBC Count 4.11 3.80 - 5.20 10e6/uL    Hemoglobin 12.6 11.7 - 15.7 g/dL    Hematocrit 37.3 35.0 - 47.0 %    MCV 91 78 - 100 fL    MCH 30.7 26.5 - 33.0 pg    MCHC 33.8 31.5 - 36.5 g/dL    RDW 13.9 10.0 - 15.0 %    Platelet Count 339 150 - 450 10e3/uL    % Neutrophils 78 %    % Lymphocytes 15 %    % Monocytes 5 %    % Eosinophils 0 %    % Basophils 1 %    % Immature Granulocytes 1 %    NRBCs per 100 WBC 0 <1 /100    Absolute Neutrophils 9.1 (H) 1.6 - 8.3 10e3/uL    Absolute Lymphocytes 1.7 0.8 - 5.3 10e3/uL    Absolute Monocytes 0.6 0.0 - 1.3 10e3/uL    Absolute Eosinophils 0.0 0.0 - 0.7 10e3/uL    Absolute Basophils 0.1 0.0 - 0.2 10e3/uL    Absolute Immature Granulocytes 0.1 (H) <=0.0 10e3/uL    Absolute NRBCs 0.0 10e3/uL   Hemoglobin A1c    Collection Time: 09/06/21  7:44 PM   Result Value Ref Range    Hemoglobin A1C 5.2 <=5.6 %   Lipid panel reflex to direct LDL: Non-fasting    Collection Time: 09/06/21  7:44 PM   Result  Value Ref Range    Cholesterol 217 (H) <=199 mg/dL    Triglycerides 103 <=149 mg/dL    Direct Measure HDL 55 >=50 mg/dL    LDL Cholesterol Calculated 141 (H) <=129 mg/dL   Extra Red Top Tube    Collection Time: 09/06/21  7:46 PM   Result Value Ref Range    Hold Specimen JIC    Extra Green Top (Lithium Heparin) Tube    Collection Time: 09/06/21  7:46 PM   Result Value Ref Range    Hold Specimen JIC    ECG 12-LEAD WITH MUSE (LHE)    Collection Time: 09/06/21  9:08 PM   Result Value Ref Range    Systolic Blood Pressure  mmHg    Diastolic Blood Pressure  mmHg    Ventricular Rate 63 BPM    Atrial Rate 63 BPM    OR Interval 176 ms    QRS Duration 80 ms     ms    QTc 491 ms    P Axis 86 degrees    R AXIS 83 degrees    T Axis 63 degrees    Interpretation ECG       Sinus rhythm with Premature supraventricular complexes  Septal infarct , age undetermined  Abnormal ECG  No previous ECGs available  Confirmed by SEE ED PROVIDER NOTE FOR, ECG INTERPRETATION ([id number]),  GUSTAVO WHITE ([id number]) on 9/7/2021 7:28:14 AM     Asymptomatic COVID-19 Virus (Coronavirus) by PCR Nasopharyngeal    Collection Time: 09/06/21 10:12 PM    Specimen: Nasopharyngeal; Swab   Result Value Ref Range    SARS CoV2 PCR Negative Negative   Drugs of Abuse 1+ Panel, Urine ([facility] Only)    Collection Time: 09/06/21 10:56 PM   Result Value Ref Range    Amphetamines Urine Screen Negative Screen Negative    Benzodiazepines Urine Screen Negative Screen Negative    Opiates Urine Screen Negative Screen Negative    PCP Urine Screen Negative Screen Negative    Cannabinoids Urine Screen Positive (A) Screen Negative    Barbiturates Urine Screen Negative Screen Negative    Cocaine Urine Screen Negative Screen Negative    Methadone Urine Screen Negative Screen Negative    Oxycodone Urine Screen Negative Screen Negative    Creatinine Urine mg/dL 69 mg/dL   Potassium    Collection Time: 09/07/21  1:33 AM   Result Value Ref Range    Potassium 3.5 3.5 - 5.0 mmol/L    CBC with platelets    Collection Time: 09/07/21  6:17 AM   Result Value Ref Range    WBC Count 11.1 (H) 4.0 - 11.0 10e3/uL    RBC Count 3.51 (L) 3.80 - 5.20 10e6/uL    Hemoglobin 10.8 (L) 11.7 - 15.7 g/dL    Hematocrit 31.5 (L) 35.0 - 47.0 %    MCV 90 78 - 100 fL    MCH 30.8 26.5 - 33.0 pg    MCHC 34.3 31.5 - 36.5 g/dL    RDW 13.8 10.0 - 15.0 %    Platelet Count 282 150 - 450 10e3/uL   Basic metabolic panel    Collection Time: 09/07/21  6:17 AM   Result Value Ref Range    Sodium 138 136 - 145 mmol/L    Potassium 3.3 (L) 3.5 - 5.0 mmol/L    Chloride 104 98 - 107 mmol/L    Carbon Dioxide (CO2) 25 22 - 31 mmol/L    Anion Gap 9 5 - 18 mmol/L    Urea Nitrogen 11 8 - 22 mg/dL    Creatinine 0.92 0.60 - 1.10 mg/dL    Calcium 8.5 8.5 - 10.5 mg/dL    Glucose 89 70 - 125 mg/dL    GFR Estimate 67 >60 mL/min/1.73m2       Total time spent for face to face visit, reviewing labs/imaging studies, counseling and coordination of care was: 1 Hour 15 Minutes More than 50% of this time was spent on counseling and coordination of care.      This note was dictated using voice recognition software.  Any grammatical or context distortions are unintentional and inherent to the software.

## 2021-09-07 NOTE — ED PROVIDER NOTES
Emergency Department Encounter         FINAL IMPRESSION:  STROKE        ED COURSE AND MEDICAL DECISION MAKING       ED Course as of Sep 06 2309   Mon Sep 06, 2021   1925 Patient is a 60-year-old female with history of hypertension although noncompliant per EMS[place].  At that time she stated that she felt the room was spinning.  Dropped her malt that she was drinking.  States she had [place] getting [place].  EMS was called.  EMS states that patient had some slurred speech at one point as well as some weakness left arm although was transient.  Arrival here initially saw patient in the hallway and she was alert and oriented x4 with no pronator drift, facial palsies, dysarthria or weakness.  We brought her back to her room for a further evaluation at that point I did notice persisting left beating nonfatigable nystagmus as well as left arm dysmetria.  Right arm normal.  Patient although she has left arm dysmetria, has normal heel-to-shin bilaterally.  Code was called      1940 Discussed case with Dr. Noyola who is recommending tenecteplase      1940 Call from radiology, plain brain suggesting acute/subacute cerebellar strokes.  Patient has a contrast allergy.  Discussed with radiologist regarding recommendations.  This point we will send patient over for a stat MRA/plain brain stroke protocol.      2006 Concern initially that patient may have had these symtpoms 2 weeks ago which would put patient into a contraindication for TPA as the plain brain is suggesting old/acute/subacte infarcts      2125 EKG is sinus rhythm rate of 63, no signs of acute ischemia, inversion noted in V2 isolated, no STEMI, no depressions      2234 Patient with multiple subacute acute to chronic strokes.  Chronic petechial hemorrhage.  Not on thinners.  Plan for admission here for further management.        7:17 PM I met with the patient for the initial interview  and physical examination. Discussed plan for treatment and workup in the ED.    7:24 PM Tier 1 stroke code called.   7:27 PM I discussed the patient with Dr. Noyola, neurologist.   7:40 PM I discussed the patient with radiologist.   7:57 PM I discussed radiology results with Dr. Noyola neurologist.   8:29 PM I discussed the imaging results with .     Patient will be admitted here at [facility].  Discussed case with hospitalist.      Critical Care     Performed by: Prudencio Castaneda  Authorized by: Prudencio Castaneda  Total critical care time: 120minutes  Critical care was necessary to treat or prevent imminent or life-threatening deterioration of the following conditions:  Stroke  Critical care was time spent personally by me on the following activities: development of treatment plan with patient or surrogate, discussions with consultants, examination of patient, evaluation of patient's response to treatment, obtaining history from patient or surrogate, ordering and performing treatments and interventions, ordering and review of laboratory studies, ordering and review of radiographic studies, re-evaluation of patient's condition and monitoring for potential decompensation.  Critical care time was exclusive of separately billable procedures and treating other patients.                  EKG      At the conclusion of the encounter I discussed the results of all the tests and the disposition. The questions were answered. The patient or family acknowledged understanding and was agreeable with the care plan.  PPE: Provider wore gloves, N95 mask, eye protection, and paper mask.                   MEDICATIONS GIVEN IN THE EMERGENCY DEPARTMENT:  Medications   lactated ringers BOLUS 1,000 mL (1,000 mLs Intravenous New Bag 9/6/21 2030)     Followed by   lactated ringers infusion (has no administration in time range)   LORazepam (ATIVAN) injection 1 mg (1 mg Intravenous Given 9/6/21 2040)       NEW PRESCRIPTIONS STARTED AT TODAY'S ED VISIT:  New  Prescriptions    No medications on file       HPI     Patient information obtained from: EMS and self     Use of Interpretor: N/A     Yoana Weeks is a 60 year old female with no pertinent history on file who presents to this ED via EMS for evaluation of dizziness, slurred speech, left sided weakness.     [place]1 [place] consuming a malt when she developed sudden onset of dizziness, nausea, slurred speech, and left sided weakness. She dropped her malt and went to the bathroom where she needed help and then called 911. Patient endorses a history of similar symptoms diagnosed as heat stroke. Per EMS, she is on various medications for blood pressure and higher cholesterol. Patient denies any alcohol consumption today and any drug use besides occasionally smoking marijuana. Per EMS, patient has slight slurred speech and difficulty forming words or phrases. Patient denies any other symptoms or complaints today.         REVIEW OF SYSTEMS:  Review of Systems   Constitutional: Negative for fever, malaise  HEENT: Negative runny nose, sore throat, ear pain, neck pain  Respiratory: Negative for shortness of breath, cough, congestion  Cardiovascular: Negative for chest pain, leg edema  Gastrointestinal: Negative for abdominal distention, abdominal pain, constipation, vomiting, diarrhea. Positive for nausea.   Genitourinary: Negative for dysuria and hematuria.   Integument: Negative for rash, skin breakdown  Neurological: Negative for paresthesias, headache. Positive for slurred speech, left sided weakness, difficulty forming words, dizziness.   Musculoskeletal: Negative for joint pain, joint swelling      All other systems reviewed and are negative.          MEDICAL HISTORY     No past medical history on file.    No past surgical history on file.    Social History     Tobacco Use     Smoking status: Not on file   Substance Use Topics     Alcohol use: Not on file     Drug use: Not on file        No current outpatient medications on file.          PHYSICAL EXAM     BP (!) 179/93   Pulse 63   Resp 24   Wt 55.1 kg (121 lb 6.4 oz)   SpO2 98%       PHYSICAL EXAM:     General: Patient appears well, nontoxic, comfortable  HEENT: Moist mucous membranes, no tongue swelling.  No head trauma.  No midline neck pain.  Cardiovascular: Normal rate, normal rhythm, no extremity edema.  No appreciable murmur.  Respiratory: No signs of respiratory distress, lungs are clear to auscultation bilaterally with no wheezes rhonchi or rales.  Abdominal: Soft, nontender, nondistended, no palpable masses, no guarding, no rebound  Musculoskeletal: Full range of motion of joints, no deformities appreciated.  Neurological: Initially saw patient in the hallway and she was alert and oriented x4 with no pronator drift, facial palsies, dysarthria or weakness. Later did notice persisting left beating nonfatigable nystagmus as well as left arm dysmetria.  Right arm normal.  Patient although she has left arm dysmetria, has normal heel-to-shin bilaterally.  Psychological: Normal affect and mood.  Integument: No rashes appreciated        RESULTS       Labs Ordered and Resulted from Time of ED Arrival Up to the Time of Departure from the ED   BASIC METABOLIC PANEL - Abnormal; Notable for the following components:       Result Value    Potassium 3.1 (*)     Creatinine 1.30 (*)     GFR Estimate 44 (*)     All other components within normal limits   INR - Abnormal; Notable for the following components:    INR 1.16 (*)     All other components within normal limits   CBC WITH PLATELETS AND DIFFERENTIAL - Abnormal; Notable for the following components:    WBC Count 11.5 (*)     Absolute Neutrophils 9.1 (*)     Absolute Immature Granulocytes 0.1 (*)     All other components within normal limits   ISTAT CREATININE POCT - Abnormal; Notable for the following components:    Creatinine POCT 1.3 (*)     GFR, ESTIMATED POCT 44 (*)     All other  components within normal limits   ETHYL ALCOHOL LEVEL - Normal   PARTIAL THROMBOPLASTIN TIME - Normal   TROPONIN I - Normal   CBC WITH PLATELETS & DIFFERENTIAL    Narrative:     The following orders were created for panel order CBC with Platelets & Differential.  Procedure                               Abnormality         Status                     ---------                               -----------         ------                     CBC with platelets and d...[[id number]]  Abnormal            Final result                 Please view results for these tests on the individual orders.   EXTRA RED TOP TUBE   EXTRA GREEN TOP (LITHIUM HEPARIN) TUBE   GLUCOSE MONITOR NURSING POCT   INITIATE   NOTIFY   MEASURE WEIGHT   VITAL SIGNS   CARDIAC CONTINUOUS MONITORING   PULSE OXIMETRY NURSING   ACTIVITY   PERIPHERAL IV CATHETER   IV ACCESS   NEURO CHECKS   HEAD OF BED   IP DYSPHAGIA SCREEN   EXTRA TUBE    Narrative:     The following orders were created for panel order [place] Draw.  Procedure                               Abnormality         Status                     ---------                               -----------         ------                     Extra Red Top Tube[[id number]]                               Final result               Extra Green Top (Lithium...[[id number]]                      Final result                 Please view results for these tests on the individual orders.   URINE DRUGS OF ABUSE SCREEN       CT Head w/o Contrast   Final Result   IMPRESSION:   1.  Patchy areas of low attenuation in the left cerebellar hemisphere are somewhat poorly evaluated due to streak artifact, though may represent acute to early subacute ischemia. These are superimposed on mild burden scattered chronic bilateral    cerebellar infarcts.      2.  Mild volume loss and moderate burden presumed chronic small vessel ischemia.      3.  No hemorrhage.      Findings discussed Dr. Castaneda at 1936 09/06/2021.      MRA Brain ([place] of [place])  wo Contrast    (Results Pending)   MR Brain for Stroke Limited    (Results Pending)                     PROCEDURES:  Procedures:  Procedures       I, Siena Noble am serving as a scribe to document services personally performed by Prudencio Castaneda DO, based on my observations and the provider's statements to me.  I, Prudencio Castaneda DO, attest that Siena Noble is acting in a scribe capacity, has observed my performance of the services and has documented them in accordance with my direction.    Prudencio Castaneda DO  Emergency Medicine  [facility] EMERGENCY DEPARTMENT      Prudencio Castaneda DO  09/08/21 [id number]

## 2021-09-07 NOTE — ED NOTES
[facility] ED Handoff Report    ED Chief Complaint:dizziness      ED Diagnosis:  (I63.9) Cerebrovascular accident (CVA), unspecified mechanism (H)  Comment: ischemic stroke, left arm weakness, drowsy from Ativan, thus kept NPO for now    Plan: admit  Med surg         PMH:  No past medical history on file.     Code Status:  Full Code     Falls Risk: No Band: Not applicable    Current Living Situation/Residence: lives in a house     Elimination Status: Continent: Yes     Activity Level: SBA    Patients Preferred Language:  English     Needed: No    Vital Signs:  BP (!) 179/88   Pulse 59   Temp 98.8  F (37.1  C) (Oral)   Resp 13   Wt 55.1 kg (121 lb 6.4 oz)   SpO2 96%      Cardiac Rhythm: SR with PVC      Pain Score: 0/10    Is the Patient Confused:  No    Last Food or Drink: 9/6/21 at       Focused Assessment:  Neuro      Tests Performed: Done: Imaging    Treatments Provided:  Labs, meds      Family Dynamics/Concerns: No    Family Updated On Visitor Policy: Yes    Plan of Care Communicated to Family: Yes    Who Was Updated about Plan of Care: patient    Belongings Checklist Done and Signed by Patient: Yes    Covid: asymptomatic , negative    Additional Information: Had MRI with Ativan, showed ischemis stroke, patient very drowsy, thus hept NPO in ER. Some residual left arm weakness, not TPA candidate. PRN Labetalol for BP greater thatn 220.      RN: Emerson   9/7/2021 12:02 AM

## 2021-09-07 NOTE — ED TRIAGE NOTES
Patient arrives c/o 1800 acute onset of dizziness, slurred speech, L sided facial numbness, nausea     tier 1 stroke code per Dr. Castaneda.

## 2021-09-07 NOTE — PROGRESS NOTES
Progress Note    Assessment/Plan  60-year-old with hypertension, smoking, hyperlipidemia who presented with acute onset dizziness slurred speech and left-sided facial numbness with nausea.  MRI MRA of the head showed multiple infarcts of varying ages in each of the cerebellar hemisphere.  Patient failed swallow study and is currently n.p.o.  Neurology was consulted and started on dual antiplatelet therapy with aspirin and Brilinta.  Echocardiogram is unremarkable carotid ultrasound is still pending patient was started on Lipitor.  Patient is visiting from [place] and insisting on leaving.  Urine toxicology was positive for marijuana which patient the patient acknowledged.    Acute bilateral cerebellar strokes  --Reviewed MRI of the brain  --Started on on aspirin Brilinta for 30 days and then aspirin indefinitely, as per neurology  --Continue Lipitor as per neurology  --Patient is normotensive and normoglycemic.  --Echocardiogram is unremarkable  --Carotid ultrasound is pending  --Continue IV hydration with normal saline at 75 mill per hour  --N.p.o. since patient failed bedside swallow.  Awaiting video swallow study  --Patient claims that she she gets nausea and vomiting with barium and therefore may consider Gastrografin.  --Continue on cardiac telemetry  --EKG does not show atrial fibrillation therefore no indication for anticoagulation  --Patient did not receive TPA due to some acute infarcts noted on CT.  --Awaiting PT/OT evaluation    Dysphagia secondary to 1  --Failed bedside swallow study  --Awaiting video swallow study  --N.p.o. until passes video swallow study  --Continue IV hydration in the interim    Acute confusion on presentation  --Improving  --Urine tox was positive for marijuana  --Patient denies any suicidal or homicidal ideation.  No clear indication for inpatient psych.    Hypertension  --Allow permissive hypertension as per neurology    BISI resolved  --Creatinine of 1.3 on admission but improved  "to less than 1 today.    Hypokalemia mild  --Order potassium replacement    Moderate protein calorie malnutrition  --Likely due to poor p.o. intake    Smoking and marijuana use  --Patient is planning to quit.      Barriers to discharge: Will do swallow study and lack of nutrition    Anticipated discharge date: 9/8        Subjective  Patient new to me.  Chart reviewed.  Patient failed bedside swallow study.  Awaiting video swallow study.  Patient claims that she is feeling better than yesterday.  She does endorse smoking marijuana.  Agrees to quit smoking.  Echocardiogram reviewed and does not show any thrombus.  Decrease IV fluids to 75 cc an hour and change to normal saline.  Patient is normoglycemic and normotensive.  Avoid hypotension as per neurology.    Objective    BP (!) 140/79 (BP Location: Right arm)   Pulse 62   Temp 98.1  F (36.7  C) (Oral)   Resp 12   Ht 1.676 m (5' 6\")   Wt 53.7 kg (118 lb 4.8 oz)   SpO2 97%   BMI 19.09 kg/m    Weight:   Wt Readings from Last 5 Encounters:   09/07/21 53.7 kg (118 lb 4.8 oz)       I/O last 3 completed shifts:  In: 2015 [P.O.:120; I.V.:895; IV Piggyback:1000]  Out: 900 [Urine:900]  I/O this shift:  In: 614 [I.V.:614]  Out: -           Physical Exam  Alert, oriented*3  No nasal twang  Patient denies any homicidal or suicidal ideation.  Body mass index is 19.09 kg/m .  Undernourished  No sinus tenderness  Moist membranes  Neck supple  CVS: S1 S2-N, no murmurs, gallops, rubs  Resp: B/L vesicular breath sounds, no wheezing, crackles  Abd: soft, No t/g/r  Neuro: Cranial nerves III, IV, VI, V, 7, 12 are intact  Patient able to enunciate \"[place] constitution\".    Vasc: no leg edema     Pertinent Labs  ----------------------  Recent Labs   Lab 09/07/21  1257 09/07/21  0949 09/07/21  0617 09/07/21  0133 09/06/21  1944 09/06/21  1938   NA  --   --  138  --  139  --    POTASSIUM 3.3*  --  3.3* 3.5 3.1*  --    CO2  --   --  25  --  23  --    BUN  --   --  11  --  14  --    CR "  --   --  0.92  --  1.30* 1.3*   GLC  --  86 89  --  112  --      Recent Labs   Lab 21  0617 21   WBC 11.1* 11.5*   HGB 10.8* 12.6   HCT 31.5* 37.3    339     Recent Labs   Lab 21   INR 1.16*     Glucose Values Latest Ref Rng & Units 2021   Bedside Glucose (mg/dl )  - -- --   GLUCOSE 70 - 125 mg/dL 112 89         Pertinent Radiology   Radiology Results: Personally reviewed impression/s  Echocardiogram Complete    Result Date: 2021  [id number]^MO^[address] [address]  Name: DEBORAH MORGAN MRN: [id number] : 1960 Study Date: 2021 09:03 AM Age: 60 yrs Gender: Female Patient Location: [facility] Reason For Study: CVA Ordering Physician: GIDEON HASSAN Performed By: ACE  BSA: 1.6 m2 Height: 66 in Weight: 118 lb HR: 60 ______________________________________________________________________________ Procedure Complete Echo Adult. ______________________________________________________________________________ Interpretation Summary  1. Left ventricular size, wall motion and function are normal. The ejection fraction is 55-60%. 2. Normal right ventricle size and systolic function. 3. The left atrium is mildly dilated. 4. Normal RA pressure of 3 mmHg. 5. There is moderate trileaflet aortic sclerosis. No aortic stenosis is present. ______________________________________________________________________________ Left Ventricle Left ventricular size, wall motion and function are normal. The ejection fraction is 55-60%. There is normal left ventricular wall thickness. Left ventricular diastolic function is normal. No regional wall motion abnormalities noted.  Right Ventricle Normal right ventricle size and systolic function.  Atria The left atrium is mildly dilated. Right atrial size is normal. There is no color Doppler evidence of an atrial shunt.  Mitral Valve Mitral valve leaflets  appear normal. There is no evidence of mitral stenosis or clinically significant mitral regurgitation. There is trace mitral regurgitation.  Tricuspid Valve Right ventricle systolic pressure estimate normal. The right ventricular systolic pressure is approximated at 16.1 mmHg plus the right atrial pressure. IVC diameter <2.1 cm collapsing >50% with sniff suggests a normal RA pressure of 3 mmHg. There is trace tricuspid regurgitation. There is no tricuspid stenosis.  Aortic Valve There is moderate trileaflet aortic sclerosis. There is trace aortic regurgitation. No aortic stenosis is present.  Pulmonic Valve The pulmonic valve is not well seen, but is grossly normal. This degree of valvular regurgitation is within normal limits.  Vessels The aorta root is normal. Normal size ascending aorta. IVC diameter <2.1 cm collapsing >50% with sniff suggests a normal RA pressure of 3 mmHg.  Pericardium There is no pericardial effusion.  ______________________________________________________________________________ MMode/2D Measurements & Calculations IVSd: 0.74 cm LVIDd: 4.6 cm LVIDs: 2.6 cm LVPWd: 0.76 cm  FS: 43.7 % LV mass(C)d: 110.7 grams LV mass(C)dI: 69.2 grams/m2 Ao root diam: 2.8 cm LA dimension: 4.2 cm LA/Ao: 1.5 LVOT diam: 2.1 cm LVOT area: 3.3 cm2 LA Volume Indexed (AL/bp): 35.9 ml/m2 RWT: 0.33  Time Measurements MM HR: 65.0 BPM  Doppler Measurements & Calculations MV E max austin: 79.1 cm/sec MV A max austin: 73.2 cm/sec MV E/A: 1.1 MV dec time: 0.33 sec Ao V2 max: 209.8 cm/sec Ao max P.0 mmHg Ao V2 mean: 135.5 cm/sec Ao mean P.8 mmHg Ao V2 VTI: 51.0 cm JULIET(I,D): 2.2 cm2 JULIET(V,D): 2.2 cm2 LV V1 max P.7 mmHg LV V1 max: 140.8 cm/sec LV V1 VTI: 33.8 cm SV(LVOT): 111.6 ml SI(LVOT): 69.8 ml/m2 PA acc time: 0.14 sec TR max austin: 200.9 cm/sec TR max P.1 mmHg AV Austin Ratio (DI): 0.67 JULIET Index (cm2/m2): 1.4 E/E': 8.6 E/E' avg: 10.1 Lateral E/e': 8.6 Medial E/e': 11.5 Peak E' Austin: 9.2 cm/sec   ______________________________________________________________________________ Report approved by: Vicente Salgado 09/07/2021 10:09 AM       MRA Brain ([place] of [place]) wo Contrast    Result Date: 9/6/2021  EXAM: MR BRAIN FOR STROKE LIMITED, MRA BRAIN ([place] OF ANN) WO CONTRAST LOCATION: [facility] DATE/TIME: 9/6/2021 8:08 PM INDICATION: Acute vertigo, nystagmus, dysmetria left arm COMPARISON: Head CT earlier today TECHNIQUE: 1) Routine multiplanar multisequence head MRI without intravenous contrast. 2) 3D time-of-flight head MRA without intravenous contrast. FINDINGS: HEAD MRI: INTRACRANIAL CONTENTS: Examination is limited by motion despite repeat attempts. There are multiple scattered infarcts in each cerebellar hemisphere of varying ages. Several of these are chronic. There are multiple diffusion hyperintense infarcts but do not demonstrate ADC hypointensity. These demonstrate FLAIR hyperintensity and are favored to be subacute in nature. There are a few small scattered acute appearing infarcts in the left cerebral hemisphere. An additional small acute appearing infarcts in the left dorsal medulla. Small amount of likely chronic hemorrhage in the left cerebellar hemisphere. No acute hemorrhage or mass effect. Mild volume loss and moderate burden presumed chronic small vessel ischemia again noted. Normal position of the cerebellar tonsils. HEAD MRA: Limited by motion. No proximal occlusion of the [place] of [place]. Fetal origin left posterior cerebral artery.     IMPRESSION: HEAD MRI: 1.  Multiple infarcts of varying ages in each cerebellar hemisphere. These are predominantly favored to be subacute in nature with a few scattered small acute infarcts in the left cerebellar hemisphere and left dorsal medulla. The remainder are chronic. No mass effect or space occupying hemorrhage. 2.  Small amount of presumed chronic hemorrhage in the left cerebellar hemisphere. 3.  Age-related  changes. HEAD MRA: 1.  Limited by motion. No proximal occlusion of the [place] of [place]. Findings were discussed with Dr. Castaneda at 2042 09/06/2021.    MR Brain for Stroke Limited    Result Date: 9/6/2021  EXAM: MR BRAIN FOR STROKE LIMITED, MRA BRAIN ([place] OF ANN) WO CONTRAST LOCATION: [facility] DATE/TIME: 9/6/2021 8:08 PM INDICATION: Acute vertigo, nystagmus, dysmetria left arm COMPARISON: Head CT earlier today TECHNIQUE: 1) Routine multiplanar multisequence head MRI without intravenous contrast. 2) 3D time-of-flight head MRA without intravenous contrast. FINDINGS: HEAD MRI: INTRACRANIAL CONTENTS: Examination is limited by motion despite repeat attempts. There are multiple scattered infarcts in each cerebellar hemisphere of varying ages. Several of these are chronic. There are multiple diffusion hyperintense infarcts but do not demonstrate ADC hypointensity. These demonstrate FLAIR hyperintensity and are favored to be subacute in nature. There are a few small scattered acute appearing infarcts in the left cerebral hemisphere. An additional small acute appearing infarcts in the left dorsal medulla. Small amount of likely chronic hemorrhage in the left cerebellar hemisphere. No acute hemorrhage or mass effect. Mild volume loss and moderate burden presumed chronic small vessel ischemia again noted. Normal position of the cerebellar tonsils. HEAD MRA: Limited by motion. No proximal occlusion of the [place] of [place]. Fetal origin left posterior cerebral artery.     IMPRESSION: HEAD MRI: 1.  Multiple infarcts of varying ages in each cerebellar hemisphere. These are predominantly favored to be subacute in nature with a few scattered small acute infarcts in the left cerebellar hemisphere and left dorsal medulla. The remainder are chronic. No mass effect or space occupying hemorrhage. 2.  Small amount of presumed chronic hemorrhage in the left cerebellar hemisphere. 3.  Age-related changes. HEAD  MRA: 1.  Limited by motion. No proximal occlusion of the [place] of [place]. Findings were discussed with Dr. Castaneda at 2042 09/06/2021.    CT Head w/o Contrast    Result Date: 9/6/2021  EXAM: CT HEAD W/O CONTRAST LOCATION: [facility] DATE/TIME: 9/6/2021 7:34 PM INDICATION: Code stroke to evaluate for potential thrombolysis and thrombectomy. COMPARISON: None. TECHNIQUE: Routine CT Head without IV contrast. Multiplanar reformats. Dose reduction techniques were used. FINDINGS: INTRACRANIAL CONTENTS: No intracranial hemorrhage, extraaxial collection, or mass effect. Patchy areas of chronic ischemia within each cerebellar hemisphere. Superimposed within the left cerebellar hemisphere, there appears to be scattered mild to moderate burden of patchy likely more recent, acute to subacute infarcts; however, this is somewhat poorly evaluated by streak artifact. Mild volume loss and moderate burden presumed chronic small vessel ischemia. VISUALIZED ORBITS/SINUSES/MASTOIDS: No intraorbital abnormality. No paranasal sinus mucosal disease. No middle ear or mastoid effusion. BONES/SOFT TISSUES: No acute abnormality.     IMPRESSION: 1.  Patchy areas of low attenuation in the left cerebellar hemisphere are somewhat poorly evaluated due to streak artifact, though may represent acute to early subacute ischemia. These are superimposed on mild burden scattered chronic bilateral cerebellar infarcts. 2.  Mild volume loss and moderate burden presumed chronic small vessel ischemia. 3.  No hemorrhage. Findings discussed Dr. Castaneda at 1936 09/06/2021.    EKG Results: personally reviewed.

## 2021-09-07 NOTE — PHARMACY-CONSULT NOTE
Pharmacy Consult to evaluate for medication related stroke core measures    Yoana Weeks, 60 year old female admitted for bilateral cerebellar and left medulla infarction on 9/6/2021.    Thrombolytic was not given because of Radiologic contraindications    VTE Prophylaxis Enoxaparin given on 9/7/21 as appropriate prior to end of hospital day 2.    Antithrombotic: aspirin and ticagrelor started on 9/7/21, as appropriate by end of hospital day 2. Continue antithrombotic therapy on discharge to meet quality measures, unless contraindicated.    Anticoagulation if history of A-fib/flutter: Patient does not have history of A-fib/flutter - anticoagulation not required for medication related stroke core measures.     LDL Cholesterol Calculated   Date Value Ref Range Status   09/06/2021 141 (H) <=129 mg/dL Final       Recommendations: Please order statin therapy    Thank you for the consult.    Rossana Vizcaino [facility] 9/7/2021 10:27 AM

## 2021-09-08 ENCOUNTER — APPOINTMENT (OUTPATIENT)
Dept: PHYSICAL THERAPY | Facility: HOSPITAL | Age: 61
DRG: 065 | End: 2021-09-08
Payer: COMMERCIAL

## 2021-09-08 ENCOUNTER — APPOINTMENT (OUTPATIENT)
Dept: SPEECH THERAPY | Facility: HOSPITAL | Age: 61
DRG: 065 | End: 2021-09-08
Payer: COMMERCIAL

## 2021-09-08 ENCOUNTER — APPOINTMENT (OUTPATIENT)
Dept: OCCUPATIONAL THERAPY | Facility: HOSPITAL | Age: 61
DRG: 065 | End: 2021-09-08
Payer: COMMERCIAL

## 2021-09-08 LAB
GLUCOSE BLDC GLUCOMTR-MCNC: 114 MG/DL (ref 70–125)
GLUCOSE BLDC GLUCOMTR-MCNC: 59 MG/DL (ref 70–125)
GLUCOSE BLDC GLUCOMTR-MCNC: 88 MG/DL (ref 70–125)
GLUCOSE BLDC GLUCOMTR-MCNC: 96 MG/DL (ref 70–125)
POTASSIUM BLD-SCNC: 3.7 MMOL/L (ref 3.5–5)

## 2021-09-08 PROCEDURE — 97116 GAIT TRAINING THERAPY: CPT | Mod: GP

## 2021-09-08 PROCEDURE — 99232 SBSQ HOSP IP/OBS MODERATE 35: CPT | Performed by: PSYCHIATRY & NEUROLOGY

## 2021-09-08 PROCEDURE — 250N000013 HC RX MED GY IP 250 OP 250 PS 637: Performed by: PSYCHIATRY & NEUROLOGY

## 2021-09-08 PROCEDURE — 99233 SBSQ HOSP IP/OBS HIGH 50: CPT | Performed by: INTERNAL MEDICINE

## 2021-09-08 PROCEDURE — 97112 NEUROMUSCULAR REEDUCATION: CPT | Mod: GP

## 2021-09-08 PROCEDURE — 36415 COLL VENOUS BLD VENIPUNCTURE: CPT | Performed by: INTERNAL MEDICINE

## 2021-09-08 PROCEDURE — 250N000011 HC RX IP 250 OP 636: Performed by: EMERGENCY MEDICINE

## 2021-09-08 PROCEDURE — 250N000013 HC RX MED GY IP 250 OP 250 PS 637: Performed by: EMERGENCY MEDICINE

## 2021-09-08 PROCEDURE — 92526 ORAL FUNCTION THERAPY: CPT | Mod: GN

## 2021-09-08 PROCEDURE — 97530 THERAPEUTIC ACTIVITIES: CPT | Mod: GO

## 2021-09-08 PROCEDURE — 120N000001 HC R&B MED SURG/OB

## 2021-09-08 PROCEDURE — 97535 SELF CARE MNGMENT TRAINING: CPT | Mod: GO

## 2021-09-08 PROCEDURE — 84132 ASSAY OF SERUM POTASSIUM: CPT | Performed by: INTERNAL MEDICINE

## 2021-09-08 RX ORDER — ATORVASTATIN CALCIUM 10 MG/1
20 TABLET, FILM COATED ORAL EVERY EVENING
Status: DISCONTINUED | OUTPATIENT
Start: 2021-09-08 | End: 2021-09-09 | Stop reason: HOSPADM

## 2021-09-08 RX ADMIN — ENOXAPARIN SODIUM 40 MG: 40 INJECTION SUBCUTANEOUS at 08:34

## 2021-09-08 RX ADMIN — ATORVASTATIN CALCIUM 20 MG: 10 TABLET, FILM COATED ORAL at 20:01

## 2021-09-08 RX ADMIN — ASPIRIN 81 MG: 81 TABLET, COATED ORAL at 08:34

## 2021-09-08 RX ADMIN — TICAGRELOR 90 MG: 90 TABLET ORAL at 08:34

## 2021-09-08 RX ADMIN — TICAGRELOR 90 MG: 90 TABLET ORAL at 20:03

## 2021-09-08 NOTE — PLAN OF CARE
Problem: Respiratory Compromise (Stroke, Hemorrhagic)  Goal: Effective Oxygenation and Ventilation  Intervention: Optimize Oxygenation and Ventilation  Recent Flowsheet Documentation  Taken 9/7/2021 2100 by Fidelina Dean, RN  Head of Bed (HOB) Positioning: HOB at 30 degrees  Taken 9/7/2021 1700 by Fidelina Dean, RN  Head of Bed (HOB) Positioning: HOB at 30 degrees   Productive cough noted. LS coarse in all lobes. IS encouraged but declined. O2 sats 96% on RA.       Problem: Functional Ability Impaired (Stroke, Hemorrhagic)  Goal: Optimal Functional Ability  Intervention: Optimize Functional Ability  Recent Flowsheet Documentation  Taken 9/7/2021 2100 by Fidelina Dean, RN  Activity Management:   activity adjusted per tolerance   activity encouraged  Taken 9/7/2021 1700 by Fidelina Dean, RN  Activity Management:   activity adjusted per tolerance   activity encouraged   A1 to BR.    -Denies pain  -NIH score of 1 for slight numbness to face, around lips. Patient reports numbness is almost resolved.   -NSR with prolonged QT on tele.   -Denies lightheadedness but c/o nausea. Emesis x 1; clear . IV Zofran given and effective. Currently denies nausea.   -Declined supper

## 2021-09-08 NOTE — CONSULTS
"CM met with patient and attempted to complete assessment-  was present per patient request. Friend José bedside throughout (per patient, \"he brought me and 18 cats to [place] to begin with\"). Friend Shalini joined at the end (per patient \"she helped raise me when I was little\").     Patient initially asked CM to leave room - \"not interested in meeting with a \" and  \"only wanting to talk with hospital  or \".     Patient allowed to vent. She is very clear about her desire \"to get better, back to 100%\". She is also very clear about desire to \"get back home to [place] whatever that means\".     She is not interested in applying for MA in [place]. She is not interested in acute rehab or TCU placement in [place].     She has PCP back home in [place]. She is willing to arrange any necessary follow up care from hospital discharge in [place] once she returns home. Her friend José confirms he is able to drive patient back to [place] in her vehicle. Both friend José and friend Shalini were supportive of patient decision to discharge from hospital and return back home to [place]- neither mentioned any safety concerns. Patient states alternate means of transportation available back in [place] if she is not to be driving.     Patient is agreeable to staying one more night in the hospital. Her friend José will be providing the transportation at hospital discharge.     CM updated MD.   "

## 2021-09-08 NOTE — PROGRESS NOTES
Spiritual Health Note    Spiritual Assessment:     visited patient due to patient request and was visiting alongside care management. Yoana voiced her frustrations and concerns about what the plan of care and disposition options for her were. Her friend José was present and is willing to drive Yoana back to her home in [place] where she has additional support and Yoana reports having good access to medical care near her home.     Patient comes from [employer] maegan background and derives meaning, purpose, and comfort from maegan. She was grateful for a prayer. Her friend Shalini had come to visit as well with devotional materials.     Yoana was experiencing emotional distress, saying that she was really afraid and wanting to get back to 100% health. She shared about grief/loss, naming her parents and brother who are . Yoana displayed a wide range of emotion during the visit. She was calm at the end of the visit and expressed appreciation for being able to talk.     Care Provided:    Introduced self and role of Spiritual Care     Empathic listening and presence     Helped patient in processing of emotions     Collaborated with interdisciplinary staff     Offered prayer     Plan of Care: A  will continue to visit as able or per request by patient/family/staff.         Arturo Suh MDiv, [facility]

## 2021-09-08 NOTE — PLAN OF CARE
Problem: Adult Inpatient Plan of Care  Goal: Plan of Care Review  Outcome: Improving     Problem: Adult Inpatient Plan of Care  Goal: Optimal Comfort and Wellbeing  Outcome: Improving  Intervention: Provide Person-Centered Care  Recent Flowsheet Documentation  Taken 9/8/2021 0800 by Kristin Guo RN  Trust Relationship/Rapport:   care explained   emotional support provided   questions answered     Problem: Adjustment to Illness (Stroke, Hemorrhagic)  Goal: Optimal Coping  Outcome: Improving  NIH score has been 1 all shift. Had slight numbness to left cheek.  Denies pain.  Eating and drinking well.  Pt called for a  visit today.  Pt very upset about her condition and not coping well.  Able to amb with assist of one.  Dizzy with ambulation but improved from yesterday.  Plan to discharge tomorrow.

## 2021-09-08 NOTE — PROGRESS NOTES
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  Although she presented within 4-1/2-hour window, due to subacute infarcts noted on CT scan she was felt not to be a candidate for tenecteplase.  Subsequently MRI scan confirmed bilateral subacute and acute infarcts    Although patient presented with 4-1/2-hour window, CT scan suggested subacute infarct and therefore did not received tenecteplase.  She does admit she had a passing out spell week ago in [place] when paramedics were called    Continue IV hydration keep systolic blood pressure less than 180 and diastolic less than 100    Dual antiplatelet therapy with baby aspirin and Brilinta.  After 30 days she should continue on baby aspirin monotherapy    High intensity statin with goal of LDL less than 70.  Patient started on Lipitor 20 mg daily    Echocardiogram shows a normal ejection fraction with moderate aortic sclerosis.  Carotid ultrasound shows less than 50% bilateral ICA stenosis    Evaluated by physical and Occupational Therapy and they recommend acute rehab    Patient was confused on presentation and urine toxicology screen was positive for marijuana    Patient wants to go back to [place] as soon as possible and wants her boss notified that she is admitted to the hospital    Neurology Discharge Planning :   DEENA Noyola MD  [facility]  ([facility], [facility]     PROBLEM LIST      Patient Active Problem List   Diagnosis Code     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     SUBJECTIVE     Feels nausea is improving but still very dizzy.  She wants to leave as she has \"animals to take care of in [place].\"  Very emotional and crying worried that she is about to die     OBJECTIVE     Vital signs in last 24 hours  Temp:  [97.5  F (36.4  C)-98.9  F (37.2  C)] 98.5  F (36.9  C)  Pulse:  [56-90] 90  Resp:  [12-20] 20  BP: (140-184)/(58-81) 149/58  SpO2:  [95 %-97 %] 97 %    Weight:   Wt Readings from Last 1 Encounters:   09/07/21 53.7 kg (118 lb 4.8 oz)         I/O last 24 hours  Intake/Output Summary (Last 24 hours) at 9/8/2021 0441  Last data filed at 9/7/2021 2313  Gross per 24 hour   Intake 2411 ml   Output 0 ml   Net 2411 ml     Review of Systems   Pertinent items are noted in HPI.    General Physical Exam: Patient is alert and oriented x 3. Vital signs were reviewed and are documented in EMR. Neck was supple, no carotid bruit, thyromegaly, JVD or lymphadenopathy noted.  Neurological Exam:  Mental status: Alert and oriented x3 somewhat anxious  Speech: Normal with no dysarthria or aphasia  Cranial nerves: Extraocular movements are intact.  She has decreased light touch pinprick on the left half of the face  Motor: Decreased mass with normal tone 5/5 strength  Reflexes: 1+ in biceps triceps absent at brachioradialis knees and ankles toes downgoing  Sensory: Sensation intact to light touch pinprick in the extremities, decreased on the left side of the face  Coordination: Dysmetria on left finger-nose testing and heel-knee-shin normal on the right  Gait: Deferred for safety     DIAGNOSTIC STUDIES     Pertinent Radiology   Following imaging studies were reviewed:    CT  1.  Patchy areas of low attenuation in the left cerebellar hemisphere are somewhat poorly evaluated due to streak artifact, though may represent acute " to early subacute ischemia. These are superimposed on mild burden scattered chronic bilateral   cerebellar infarcts.     2.  Mild volume loss and moderate burden presumed chronic small vessel ischemia.     3.  No hemorrhage.      MRI  HEAD MRI:   1.  Multiple infarcts of varying ages in each cerebellar hemisphere. These are predominantly favored to be subacute in nature with a few scattered small acute infarcts in the left cerebellar hemisphere and left dorsal medulla. The remainder are chronic.   No mass effect or space occupying hemorrhage.     2.  Small amount of presumed chronic hemorrhage in the left cerebellar hemisphere.     3.  Age-related changes.     HEAD MRA:   1.  Limited by motion. No proximal occlusion of the [place].    Echocardiogram  Echo result w/o MOPS: Interpretation Summary 1. Left ventricular size, wall motion and function are normal. The ejectionfraction is 55-60%.2. Normal right ventricle size and systolic function.3. The left atrium is mildly dilated.4. Normal RA pressure of 3 mmHg.5. There is moderate trileaflet aortic sclerosis. No aortic stenosis ispresent.    Carotid Ultrasound  1.  Mild plaque formation, velocities consistent with less than 50% stenosis in the right internal carotid artery.  2.  Mild plaque formation, velocities consistent with less than 50% stenosis in the left internal carotid artery.  3.  Flow within the vertebral arteries is antegrade.    Pertinent Labs   Lab Results: Personally Reviewed  Recent Results (from the past 24 hour(s))   CBC with platelets    Collection Time: 09/07/21  6:17 AM   Result Value Ref Range    WBC Count 11.1 (H) 4.0 - 11.0 10e3/uL    RBC Count 3.51 (L) 3.80 - 5.20 10e6/uL    Hemoglobin 10.8 (L) 11.7 - 15.7 g/dL    Hematocrit 31.5 (L) 35.0 - 47.0 %    MCV 90 78 - 100 fL    MCH 30.8 26.5 - 33.0 pg    MCHC 34.3 31.5 - 36.5 g/dL    RDW 13.8 10.0 - 15.0 %    Platelet Count 282 150 - 450 10e3/uL   Basic metabolic panel    Collection Time:  09/07/21  6:17 AM   Result Value Ref Range    Sodium 138 136 - 145 mmol/L    Potassium 3.3 (L) 3.5 - 5.0 mmol/L    Chloride 104 98 - 107 mmol/L    Carbon Dioxide (CO2) 25 22 - 31 mmol/L    Anion Gap 9 5 - 18 mmol/L    Urea Nitrogen 11 8 - 22 mg/dL    Creatinine 0.92 0.60 - 1.10 mg/dL    Calcium 8.5 8.5 - 10.5 mg/dL    Glucose 89 70 - 125 mg/dL    GFR Estimate 67 >60 mL/min/1.73m2   Echocardiogram Complete    Collection Time: 09/07/21  9:38 AM   Result Value Ref Range    LVEF  55-60%    Glucose by meter    Collection Time: 09/07/21  9:49 AM   Result Value Ref Range    GLUCOSE BY METER POCT 86 70 - 125 mg/dL   Potassium    Collection Time: 09/07/21 12:57 PM   Result Value Ref Range    Potassium 3.3 (L) 3.5 - 5.0 mmol/L   Glucose by meter    Collection Time: 09/07/21  5:25 PM   Result Value Ref Range    GLUCOSE BY METER POCT 92 70 - 125 mg/dL   Potassium    Collection Time: 09/07/21  9:45 PM   Result Value Ref Range    Potassium 3.6 3.5 - 5.0 mmol/L   Glucose by meter    Collection Time: 09/07/21 10:54 PM   Result Value Ref Range    GLUCOSE BY METER POCT 72 70 - 125 mg/dL         HOSPITAL MEDICATIONS       aspirin  81 mg Oral Daily    Or     aspirin  81 mg Oral or NG Tube Daily     atorvastatin  20 mg Oral QPM     enoxaparin ANTICOAGULANT  40 mg Subcutaneous Q24H     sodium chloride (PF)  3 mL Intracatheter Q8H     ticagrelor  90 mg Oral BID        Total time spent for face to face visit, reviewing labs/imaging studies, counseling and coordination of care was: 30 Minutes More than 50% of this time was spent on counseling and coordination of care.        This note was dictated using voice recognition software.  Any grammatical or context distortions are unintentional and inherent to the software.

## 2021-09-08 NOTE — PLAN OF CARE
"  Problem: Urinary Elimination Impaired (Stroke, Hemorrhagic)  Goal: Effective Urinary Elimination  9/8/2021 1631 by Fadia Weiner  Outcome: Improving  9/8/2021 1535 by Fadia Weiner  Outcome: Improving     Purewick discontinued. Patient reported she ambulated to bathroom with assist of visitor. Denies pain. Patient stated to visitor \"I'm so glad you're here, I'm so glad you visited me, it makes me feel safe.\"  "

## 2021-09-09 ENCOUNTER — APPOINTMENT (OUTPATIENT)
Dept: SPEECH THERAPY | Facility: HOSPITAL | Age: 61
DRG: 065 | End: 2021-09-09
Payer: COMMERCIAL

## 2021-09-09 VITALS
HEIGHT: 66 IN | HEART RATE: 74 BPM | DIASTOLIC BLOOD PRESSURE: 82 MMHG | WEIGHT: 118.4 LBS | SYSTOLIC BLOOD PRESSURE: 187 MMHG | RESPIRATION RATE: 20 BRPM | BODY MASS INDEX: 19.03 KG/M2 | TEMPERATURE: 98.1 F | OXYGEN SATURATION: 96 %

## 2021-09-09 LAB
ANION GAP SERPL CALCULATED.3IONS-SCNC: 11 MMOL/L (ref 5–18)
BUN SERPL-MCNC: 10 MG/DL (ref 8–22)
CALCIUM SERPL-MCNC: 8.5 MG/DL (ref 8.5–10.5)
CHLORIDE BLD-SCNC: 107 MMOL/L (ref 98–107)
CO2 SERPL-SCNC: 20 MMOL/L (ref 22–31)
CREAT SERPL-MCNC: 0.85 MG/DL (ref 0.6–1.1)
ERYTHROCYTE [DISTWIDTH] IN BLOOD BY AUTOMATED COUNT: 13.8 % (ref 10–15)
GFR SERPL CREATININE-BSD FRML MDRD: 74 ML/MIN/1.73M2
GLUCOSE BLD-MCNC: 85 MG/DL (ref 70–125)
GLUCOSE BLDC GLUCOMTR-MCNC: 75 MG/DL (ref 70–125)
HCT VFR BLD AUTO: 33.1 % (ref 35–47)
HGB BLD-MCNC: 11 G/DL (ref 11.7–15.7)
MCH RBC QN AUTO: 30.2 PG (ref 26.5–33)
MCHC RBC AUTO-ENTMCNC: 33.2 G/DL (ref 31.5–36.5)
MCV RBC AUTO: 91 FL (ref 78–100)
PLATELET # BLD AUTO: 278 10E3/UL (ref 150–450)
POTASSIUM BLD-SCNC: 3.6 MMOL/L (ref 3.5–5)
RBC # BLD AUTO: 3.64 10E6/UL (ref 3.8–5.2)
SODIUM SERPL-SCNC: 138 MMOL/L (ref 136–145)
WBC # BLD AUTO: 9.4 10E3/UL (ref 4–11)

## 2021-09-09 PROCEDURE — 250N000011 HC RX IP 250 OP 636: Performed by: EMERGENCY MEDICINE

## 2021-09-09 PROCEDURE — 85027 COMPLETE CBC AUTOMATED: CPT | Performed by: INTERNAL MEDICINE

## 2021-09-09 PROCEDURE — 250N000013 HC RX MED GY IP 250 OP 250 PS 637: Performed by: PSYCHIATRY & NEUROLOGY

## 2021-09-09 PROCEDURE — 36415 COLL VENOUS BLD VENIPUNCTURE: CPT | Performed by: INTERNAL MEDICINE

## 2021-09-09 PROCEDURE — 250N000013 HC RX MED GY IP 250 OP 250 PS 637: Performed by: EMERGENCY MEDICINE

## 2021-09-09 PROCEDURE — 99232 SBSQ HOSP IP/OBS MODERATE 35: CPT | Performed by: PSYCHIATRY & NEUROLOGY

## 2021-09-09 PROCEDURE — 92526 ORAL FUNCTION THERAPY: CPT | Mod: GN

## 2021-09-09 PROCEDURE — 80048 BASIC METABOLIC PNL TOTAL CA: CPT | Performed by: INTERNAL MEDICINE

## 2021-09-09 PROCEDURE — 99239 HOSP IP/OBS DSCHRG MGMT >30: CPT | Performed by: INTERNAL MEDICINE

## 2021-09-09 RX ORDER — ACETAMINOPHEN 325 MG/1
650 TABLET ORAL EVERY 6 HOURS PRN
Start: 2021-09-09

## 2021-09-09 RX ORDER — LISINOPRIL 2.5 MG/1
2.5 TABLET ORAL DAILY
Qty: 30 TABLET | Refills: 0 | Status: SHIPPED | OUTPATIENT
Start: 2021-09-09

## 2021-09-09 RX ORDER — ATORVASTATIN CALCIUM 20 MG/1
20 TABLET, FILM COATED ORAL EVERY EVENING
Qty: 30 TABLET | Refills: 0 | Status: SHIPPED | OUTPATIENT
Start: 2021-09-09

## 2021-09-09 RX ADMIN — TICAGRELOR 90 MG: 90 TABLET ORAL at 08:13

## 2021-09-09 RX ADMIN — ASPIRIN 81 MG: 81 TABLET, COATED ORAL at 08:13

## 2021-09-09 RX ADMIN — ENOXAPARIN SODIUM 40 MG: 40 INJECTION SUBCUTANEOUS at 08:13

## 2021-09-09 ASSESSMENT — MIFFLIN-ST. JEOR: SCORE: 1113.81

## 2021-09-09 NOTE — PLAN OF CARE
"  Problem: Adult Inpatient Plan of Care  Goal: Plan of Care Review  9/9/2021 0548 by Helen Comer RN  Outcome: Improving     Problem: Adult Inpatient Plan of Care  Goal: Absence of Hospital-Acquired Illness or Injury  9/9/2021 0548 by Helen Comer RN  Outcome: Improving  9/8/2021 2351 by Helen Comer RN  Outcome: Improving  Intervention: Identify and Manage Fall Risk  Recent Flowsheet Documentation  Taken 9/8/2021 2356 by Helen Comer RN  Safety Promotion/Fall Prevention: activity supervised  Taken 9/8/2021 1951 by Helen Comer RN  Safety Promotion/Fall Prevention: activity supervised  Intervention: Prevent Skin Injury  Recent Flowsheet Documentation  Taken 9/8/2021 2356 by Helen Comer RN  Body Position: position changed independently  Taken 9/8/2021 1951 by Helen Comer RN  Body Position: position changed independently     Problem: Adult Inpatient Plan of Care  Goal: Absence of Hospital-Acquired Illness or Injury  Intervention: Identify and Manage Fall Risk  Recent Flowsheet Documentation  Taken 9/8/2021 2356 by Helen Comer RN  Safety Promotion/Fall Prevention: activity supervised  Taken 9/8/2021 1951 by Helen Comer RN  Safety Promotion/Fall Prevention: activity supervised     Problem: Adult Inpatient Plan of Care  Goal: Absence of Hospital-Acquired Illness or Injury  Intervention: Prevent Skin Injury  Recent Flowsheet Documentation  Taken 9/8/2021 2356 by Helen Comer RN  Body Position: position changed independently  Taken 9/8/2021 1951 by Helen Comer RN  Body Position: position changed independently   VSS within ordered limit. Denied pain/discomfort. Pt reported being upset due to staff flipping on light and torchlight direct to the face to collect lab draw and ready to be discharge by 1100. Writer went in to discuss and offer if need pt advocate, however, pt insist on leaving, stating\" i'm done, I dont know what I'm paying for\". Ambulated " round the unit. In bed at this time., will continued to monitor.

## 2021-09-09 NOTE — PROGRESS NOTES
NEUROLOGY INPATIENT PROGRESS NOTE       [facility] NEUROLOGY [place]  [address] [place] [address]  Tel: [phone]  Fax: [phone]  [email or web address]     ASSESSMENT & PLAN   Hospital Day#: 3  Visit diagnosis: Brainstem infarction    Bilateral brainstem infarction (Risk factors: HTN, HLD, cigarette smoking)  60-year-old female who is visiting from [place] with history of HTN, HLD brought to the emergency room with acute onset of dizziness and weakness.  Although she presented within 4-1/2-hour window, due to subacute infarcts noted on CT scan she was felt not to be a candidate for tenecteplase.  Subsequently MRI scan confirmed bilateral subacute and acute infarcts    Although patient presented with 4-1/2-hour window, CT scan suggested subacute infarct and therefore did not received tenecteplase.  She does admit she had a passing out spell week ago in [place] when paramedics were called    Blood pressure goal in 1 week 120-130/80    Dual antiplatelet therapy with baby aspirin and Brilinta.  After 30 days she should continue on baby aspirin monotherapy    High intensity statin with goal of LDL less than 70.  Patient started on Lipitor 20 mg daily    Echocardiogram shows a normal ejection fraction with moderate aortic sclerosis.      Carotid ultrasound shows less than 50% bilateral ICA stenosis    Evaluated by physical and Occupational Therapy and they recommend acute rehab    Patient declines TCU or rehab and wants to be discharged.  Her friend plans on driving her to [place]    Neurology Discharge Planning :   Okay to discharge from neurology standpoint.  Follow-up with her neurologist in [place]    Tobin Noyola MD  [facility]  ([facility], [facility]     PROBLEM LIST      Patient Active Problem List   Diagnosis Code     Bilateral cerebellar and left medulla infarction I63.89     Essential hypertension I10     Mixed  hyperlipidemia E78.2       Past Medical History:   Patient  has no past medical history on file.     SUBJECTIVE     Continues to have feeling of dizziness.  Patient would like to return home in [place] and refuses TCU or acute rehab.  This morning she is sleeping, I did not wake her up     OBJECTIVE     Vital signs in last 24 hours  Temp:  [97.9  F (36.6  C)-98.9  F (37.2  C)] 98.1  F (36.7  C)  Pulse:  [65-96] 85  Resp:  [16-20] 18  BP: (142-189)/(71-82) 189/71  SpO2:  [96 %-98 %] 97 %    Weight:   Wt Readings from Last 1 Encounters:   09/07/21 53.7 kg (118 lb 4.8 oz)         I/O last 24 hours  Intake/Output Summary (Last 24 hours) at 9/8/2021 0441  Last data filed at 9/7/2021 2313  Gross per 24 hour   Intake 2411 ml   Output 0 ml   Net 2411 ml     Review of Systems   Pertinent items are noted in HPI.  Patient was sleeping this morning and I did not wake her up, exam yesterday  General Physical Exam: Patient is alert and oriented x 3. Vital signs were reviewed and are documented in EMR. Neck was supple, no carotid bruit, thyromegaly, JVD or lymphadenopathy noted.  Neurological Exam:  Mental status: Alert and oriented x3 somewhat anxious  Speech: Normal with no dysarthria or aphasia  Cranial nerves: Extraocular movements are intact.  She has decreased light touch pinprick on the left half of the face  Motor: Decreased mass with normal tone 5/5 strength  Reflexes: 1+ in biceps triceps absent at brachioradialis knees and ankles toes downgoing  Sensory: Sensation intact to light touch pinprick in the extremities, decreased on the left side of the face  Coordination: Dysmetria on left finger-nose testing and heel-knee-shin normal on the right  Gait: Deferred for safety     DIAGNOSTIC STUDIES     Pertinent Radiology   Following imaging studies were reviewed:    CT  1.  Patchy areas of low attenuation in the left cerebellar hemisphere are somewhat poorly evaluated due to streak artifact, though may represent acute to  early subacute ischemia. These are superimposed on mild burden scattered chronic bilateral   cerebellar infarcts.     2.  Mild volume loss and moderate burden presumed chronic small vessel ischemia.     3.  No hemorrhage.      MRI  HEAD MRI:   1.  Multiple infarcts of varying ages in each cerebellar hemisphere. These are predominantly favored to be subacute in nature with a few scattered small acute infarcts in the left cerebellar hemisphere and left dorsal medulla. The remainder are chronic.   No mass effect or space occupying hemorrhage.     2.  Small amount of presumed chronic hemorrhage in the left cerebellar hemisphere.     3.  Age-related changes.     HEAD MRA:   1.  Limited by motion. No proximal occlusion of the [place].    Echocardiogram  Echo result w/o MOPS: Interpretation Summary 1. Left ventricular size, wall motion and function are normal. The ejectionfraction is 55-60%.2. Normal right ventricle size and systolic function.3. The left atrium is mildly dilated.4. Normal RA pressure of 3 mmHg.5. There is moderate trileaflet aortic sclerosis. No aortic stenosis ispresent.    Carotid Ultrasound  1.  Mild plaque formation, velocities consistent with less than 50% stenosis in the right internal carotid artery.  2.  Mild plaque formation, velocities consistent with less than 50% stenosis in the left internal carotid artery.  3.  Flow within the vertebral arteries is antegrade.    Pertinent Labs   Lab Results: Personally Reviewed  Recent Results (from the past 24 hour(s))   Glucose by meter    Collection Time: 09/08/21  8:29 AM   Result Value Ref Range    GLUCOSE BY METER POCT 59 (LL) 70 - 125 mg/dL   Potassium    Collection Time: 09/08/21  8:51 AM   Result Value Ref Range    Potassium 3.7 3.5 - 5.0 mmol/L   Glucose by meter    Collection Time: 09/08/21  9:26 AM   Result Value Ref Range    GLUCOSE BY METER POCT 88 70 - 125 mg/dL   Glucose by meter    Collection Time: 09/08/21  1:24 PM   Result Value  Ref Range    GLUCOSE BY METER POCT 114 70 - 125 mg/dL   Glucose by meter    Collection Time: 09/08/21  7:22 PM   Result Value Ref Range    GLUCOSE BY METER POCT 96 70 - 125 mg/dL   Basic metabolic panel    Collection Time: 09/09/21  4:41 AM   Result Value Ref Range    Sodium 138 136 - 145 mmol/L    Potassium 3.6 3.5 - 5.0 mmol/L    Chloride 107 98 - 107 mmol/L    Carbon Dioxide (CO2) 20 (L) 22 - 31 mmol/L    Anion Gap 11 5 - 18 mmol/L    Urea Nitrogen 10 8 - 22 mg/dL    Creatinine 0.85 0.60 - 1.10 mg/dL    Calcium 8.5 8.5 - 10.5 mg/dL    Glucose 85 70 - 125 mg/dL    GFR Estimate 74 >60 mL/min/1.73m2   CBC with platelets    Collection Time: 09/09/21  4:41 AM   Result Value Ref Range    WBC Count 9.4 4.0 - 11.0 10e3/uL    RBC Count 3.64 (L) 3.80 - 5.20 10e6/uL    Hemoglobin 11.0 (L) 11.7 - 15.7 g/dL    Hematocrit 33.1 (L) 35.0 - 47.0 %    MCV 91 78 - 100 fL    MCH 30.2 26.5 - 33.0 pg    MCHC 33.2 31.5 - 36.5 g/dL    RDW 13.8 10.0 - 15.0 %    Platelet Count 278 150 - 450 10e3/uL         HOSPITAL MEDICATIONS       aspirin  81 mg Oral Daily    Or     aspirin  81 mg Oral or NG Tube Daily     atorvastatin  20 mg Oral QPM     enoxaparin ANTICOAGULANT  40 mg Subcutaneous Q24H     sodium chloride (PF)  3 mL Intracatheter Q8H     ticagrelor  90 mg Oral BID        Total time spent for face to face visit, reviewing labs/imaging studies, counseling and coordination of care was: 30 Minutes More than 50% of this time was spent on counseling and coordination of care.        This note was dictated using voice recognition software.  Any grammatical or context distortions are unintentional and inherent to the software.

## 2021-09-09 NOTE — DISCHARGE INSTRUCTIONS
Follow up with Neurologist when you arrive home.    Request a referral from your primary care doctor and/or Neurologist for an outpatient Videofluoroscopic Swallow Study.  This will determine whether you are safe to resume drinking thin liquids.

## 2021-09-09 NOTE — PLAN OF CARE
Speech Language Therapy Discharge Summary    Reason for therapy discharge:    Discharged to home.    Progress towards therapy goal(s). See goals on Care Plan in [facility] electronic health record for goal details.  Goals partially met.  Barriers to achieving goals:   discharge from facility.    Therapy recommendation(s):    Recommend repeat Videofluoroscopic Swallow Study on an outpatient basis in 3-5 days. Pending results, patient may benefit from ongoing Speech Therapy to continue to address dysphagia. Recommended diet at discharge is Soft & Bite-Sized (IDDSI Level 6) and mildly thick liquids (IDDSI Level 2). Please refer to Speech Pathology progress notes and daily flow sheets for any additional information.

## 2021-09-09 NOTE — PROGRESS NOTES
Care Management Discharge Note    Discharge Date: 09/09/2021       Discharge Disposition: Other (Comments) (Following up with PCP in [place])    Discharge Services:  Follow up appointment    Discharge DME:  Unknown    Discharge Transportation: family or friend will provide    Private pay costs discussed: NA    PAS Confirmation Code:  NA  Patient/family educated on Medicare website which has current facility and service quality ratings:  NA    Education Provided on the Discharge Plan:  Yes  Persons Notified of Discharge Plans: Patient  Patient/Family in Agreement with the Plan:  Yes    Handoff Referral Completed: NA    Additional Information:    Pt discharging home to [place] with a friend transporting.  Pt denies assistance with coordinating care.  Pt plans to follow up with PCP.          BRIA Flynn

## 2021-09-09 NOTE — DISCHARGE SUMMARY
[facility] MEDICINE  DISCHARGE SUMMARY     Primary Care Physician: No Ref-Primary, Physician  Admission Date: 9/6/2021   Discharge Provider: Hiram Porter DO,  Discharge Date: 9/9/2021   Diet:   Active Diet and Nourishment Order   Procedures     Soft & Bite Sized Diet (level 6) Mildly Thick (level 2); No Straws     Diet       Code Status: Full Code   Activity: DCACTIVITY: Activity as tolerated        Condition at Discharge: Stable     REASON FOR PRESENTATION(See Admission Note for Details)   Refer to h&p    PRINCIPAL & ACTIVE DISCHARGE DIAGNOSES     Principal Problem:    Bilateral cerebellar and left medulla infarction  Active Problems:    Essential hypertension    Mixed hyperlipidemia      PENDING LABS     Unresulted Labs Ordered in the Past 30 Days of this Admission     No orders found from 8/7/2021 to 9/7/2021.            PROCEDURES ( this hospitalization only)          RECOMMENDATIONS TO OUTPATIENT PROVIDER FOR F/U VISIT     Follow-up Appointments     Follow-up and recommended labs and tests       Primary care physician in 1 week             DISPOSITION     Home    SUMMARY OF HOSPITAL COURSE:    60-year-old female who is visiting from [place] with history of HTN, HLD brought to the emergency room with acute onset of dizziness and weakness.  CT scan showed subacute infarcts and therefore felt not to be a candidate for tenecteplase.  MRI Brain confirmed multiple scattered infarcts in bilateral cerebellar hemispheres of varying ages, several chronic as well as subacute/small acute infarcts in the left cerebral hemisphere and left dorsal medulla.  No acute hemorrhage.  Bilateral carotid US <50 ICA stenosis bilaterally.  .  TTE showed EF 55-60%, moderate trileaflet aortic sclerosis w/o stenosis.  Risk factors included HTN, HPL, tobacco use.  Started on Lipitor 20mg daily.  Dual antiplatelet therapy with baby aspirin and Brilinta.  After 30 days she should continue  on baby aspirin monotherapy per Neurology.  On discharge Lisinopril 2.5mg daily started.  Speech pathology consulted for dysphagia. VSS performed and based on results speech therapy advised soft diet & bite sized (level 6), mildly thick liquids level 2.  Patient refused TCU/SNF/Acute Rehab.    Discharge Medications with Med changes:      Yoana Weeks   Home Medication Instructions RAY:[id number]    Printed on:09/09/21 6358   Medication Information                      acetaminophen (TYLENOL) 325 MG tablet  Take 2 tablets (650 mg) by mouth every 6 hours as needed for mild pain (temperatures greater than 100.4  F (38  C))             aspirin (ASA) 81 MG EC tablet  Take 1 tablet (81 mg) by mouth daily In conjunction with Brillinta as prescribed for 30 days.  After 30 days continue just ASA 81mg daily.             atorvastatin (LIPITOR) 20 MG tablet  Take 1 tablet (20 mg) by mouth every evening             esomeprazole (NEXIUM) 20 MG DR capsule  Take 20 mg by mouth daily as needed Take 30-60 minutes before eating.             lisinopril (ZESTRIL) 2.5 MG tablet  Take 1 tablet (2.5 mg) by mouth daily             ticagrelor (BRILINTA) 90 MG tablet  Take 1 tablet (90 mg) by mouth 2 times daily X 30 days then continue with only ASA 81mg daily thereafter                             Consults       NEUROLOGY IP CONSULT  SPEECH LANGUAGE PATH ADULT IP CONSULT  PHARMACY IP CONSULT  PHARMACY IP CONSULT  PHARMACY IP CONSULT  PHYSICAL THERAPY ADULT IP CONSULT  OCCUPATIONAL THERAPY ADULT IP CONSULT  REHAB ADMISSIONS LIAISON IP CONSULT  CARE MANAGEMENT / SOCIAL WORK IP CONSULT  SOCIAL WORK IP CONSULT  SOCIAL WORK IP CONSULT          SIGNIFICANT IMAGING FINDINGS     Results for orders placed or performed during the hospital encounter of 09/06/21   MRA Brain ([place]) wo Contrast    Impression    IMPRESSION:  HEAD MRI:   1.  Multiple infarcts of varying ages in each cerebellar hemisphere. These are predominantly favored to be  subacute in nature with a few scattered small acute infarcts in the left cerebellar hemisphere and left dorsal medulla. The remainder are chronic.   No mass effect or space occupying hemorrhage.    2.  Small amount of presumed chronic hemorrhage in the left cerebellar hemisphere.    3.  Age-related changes.    HEAD MRA:   1.  Limited by motion. No proximal occlusion of the [place].    Findings were discussed with Dr. Castaneda at 2042 09/06/2021.   CT Head w/o Contrast    Impression    IMPRESSION:  1.  Patchy areas of low attenuation in the left cerebellar hemisphere are somewhat poorly evaluated due to streak artifact, though may represent acute to early subacute ischemia. These are superimposed on mild burden scattered chronic bilateral   cerebellar infarcts.    2.  Mild volume loss and moderate burden presumed chronic small vessel ischemia.    3.  No hemorrhage.    Findings discussed Dr. Castaneda at 1936 09/06/2021.   MR Brain for Stroke Limited    Impression    IMPRESSION:  HEAD MRI:   1.  Multiple infarcts of varying ages in each cerebellar hemisphere. These are predominantly favored to be subacute in nature with a few scattered small acute infarcts in the left cerebellar hemisphere and left dorsal medulla. The remainder are chronic.   No mass effect or space occupying hemorrhage.    2.  Small amount of presumed chronic hemorrhage in the left cerebellar hemisphere.    3.  Age-related changes.    HEAD MRA:   1.  Limited by motion. No proximal occlusion of the [place].    Findings were discussed with Dr. Castaneda at 2042 09/06/2021.   US Carotid Bilateral    Impression    IMPRESSION:  1.  Mild plaque formation, velocities consistent with less than 50% stenosis in the right internal carotid artery.  2.  Mild plaque formation, velocities consistent with less than 50% stenosis in the left internal carotid artery.  3.  Flow within the vertebral arteries is antegrade.   XR Video Swallow with SLP or OT    Impression     IMPRESSION:  1.  Incomplete epiglottic tilt.  2.  Thin barium penetration and trace silent aspiration.     Echocardiogram Complete   Result Value Ref Range    LVEF  55-60%          Discharge Orders        Reason for your hospital stay    stroke     Activity    Your activity upon discharge: activity as tolerated     Follow-up and recommended labs and tests     Primary care physician in 1 week     Diet    Follow this diet upon discharge: Orders Placed This Encounter      Soft & Bite Sized Diet (level 6) Mildly Thick (level 2); No Straws       Examination   Physical Exam   Temp:  [97.9  F (36.6  C)-98.9  F (37.2  C)] 98.1  F (36.7  C)  Pulse:  [65-96] 74  Resp:  [16-20] 20  BP: (142-189)/(71-82) 187/82  SpO2:  [96 %-97 %] 96 %  Wt Readings from Last 1 Encounters:   09/09/21 53.7 kg (118 lb 6.4 oz)     Physical Examination:  General appearance - alert, and in no distress  Eyes -  sclera anicteric  Lungs - clear to auscultation, no wheezes, rales or rhonchi, symmetric air entry  Heart - normal rate, regular rhythm, normal S1, S2, no murmurs, rubs, clicks or gallops. No peripheral edema.  Abdomen - soft, nontender, nondistended, no masses or organomegaly, BS+  Neurological - a&ox3    Total unit/floor time 31minutes.  Time consisted of examination of patient, reviewing the record, lab results, imaging results, completing documentation.  Coordination of care 20minutes discussing  with nursing, care management teams, and Physicians involved directly in the care of this patient.  Counseling time 11minutes consisted of discharge planning.        Hiram Porter DO, DO  [facility]    CC:No Ref-Primary, Physician

## 2021-09-09 NOTE — PLAN OF CARE
Problem: Adult Inpatient Plan of Care  Goal: Plan of Care Review  Outcome: Improving     Problem: Adult Inpatient Plan of Care  Goal: Absence of Hospital-Acquired Illness or Injury  Outcome: Improving  Intervention: Identify and Manage Fall Risk  Recent Flowsheet Documentation  Taken 9/8/2021 1951 by Helen Comer RN  Safety Promotion/Fall Prevention: activity supervised  Intervention: Prevent Skin Injury  Recent Flowsheet Documentation  Taken 9/8/2021 1951 by Helen Comer RN  Body Position: position changed independently     Problem: Adult Inpatient Plan of Care  Goal: Absence of Hospital-Acquired Illness or Injury  Intervention: Prevent Skin Injury  Recent Flowsheet Documentation  Taken 9/8/2021 1951 by Helen Comer RN  Body Position: position changed independently

## 2021-09-09 NOTE — PLAN OF CARE
Occupational Therapy Discharge Summary    Reason for therapy discharge:    Discharged to home.    Progress towards therapy goal(s). See goals on Care Plan in [facility] electronic health record for goal details.  Pt making progress towards goals-goals not met d/t earlier than anticipated discontinue & limited prior to discharge.    Therapy recommendation(s):    Further  therapy recommended-acute rehab had been recommended.

## 2021-09-09 NOTE — PLAN OF CARE
Physical Therapy Discharge Summary    Reason for therapy discharge:    Discharged to home.    Progress towards therapy goal(s). See goals on Care Plan in [facility] electronic health record for goal details.  Goals partially met.  Barriers to achieving goals:   discharge from facility.    Therapy recommendation(s):    Continued therapy is recommended.  Rationale/Recommendations:  PT goals not met .

## 2021-09-10 ENCOUNTER — PATIENT OUTREACH (OUTPATIENT)
Dept: CARE COORDINATION | Facility: CLINIC | Age: 61
End: 2021-09-10

## 2021-09-10 ENCOUNTER — HOSPITAL ENCOUNTER (EMERGENCY)
Facility: HOSPITAL | Age: 61
Discharge: HOME | End: 2021-09-10
Attending: EMERGENCY MEDICINE | Admitting: EMERGENCY MEDICINE

## 2021-09-10 VITALS
TEMPERATURE: 98.1 F | WEIGHT: 112.3 LBS | OXYGEN SATURATION: 100 % | BODY MASS INDEX: 18.05 KG/M2 | RESPIRATION RATE: 18 BRPM | HEART RATE: 61 BPM | HEIGHT: 66 IN | SYSTOLIC BLOOD PRESSURE: 140 MMHG | DIASTOLIC BLOOD PRESSURE: 76 MMHG

## 2021-09-10 DIAGNOSIS — Z71.89 OTHER SPECIFIED COUNSELING: ICD-10-CM

## 2021-09-10 DIAGNOSIS — F41.0 ANXIETY ATTACK: Primary | ICD-10-CM

## 2021-09-10 LAB
HOLD SPECIMEN: NORMAL
HOLD SPECIMEN: NORMAL
WHOLE BLOOD HOLD SPECIMEN: NORMAL
WHOLE BLOOD HOLD SPECIMEN: NORMAL

## 2021-09-10 PROCEDURE — 99283 EMERGENCY DEPT VISIT LOW MDM: CPT

## 2021-09-10 PROCEDURE — 93010 ELECTROCARDIOGRAM REPORT: CPT | Performed by: INTERNAL MEDICINE

## 2021-09-10 PROCEDURE — 93005 ELECTROCARDIOGRAM TRACING: CPT

## 2021-09-10 NOTE — ED PROVIDER NOTES
Subjective   Patient presents with a complaint of sudden onset of generalized weakness and dizziness and nausea and shortness of breath.  She says she was just in the hospital in [place] on Monday for a stroke or what she is telling me is now is a TIA.  She went home yesterday and then had someone drive her home.  This morning she went to see her family doctor in the afternoon thinking they could see her this afternoon but there began feeling very weak and dizzy.  She was advised by them to come to the hospital but there were no emesis available so her friend was going to drive her.  As her friend was driving her to the hospital she began feeling much better did not want to come.  On the way home she stop by the local ambulance service to tell them how and to tell him where she was at the case and happened again and while there began also again feeling very weak and dizzy and short of breath.  They brought her here for further evaluation.  She says she has been very scared of her since she was told she had that small stroke and that she had 2 small ones in the past that she did not know about.  She however right now she is feeling fine.      History provided by:  Patient   used: No    Weakness - Generalized  Severity:  Severe  Onset quality:  Sudden  Duration:  30 minutes  Timing:  Constant  Progression:  Resolved  Chronicity:  Recurrent  Context: change in medication    Context: not alcohol use, not allergies, not decreased sleep, not dehydration, not drug use, not increased activity, not pinched nerve, not recent infection, not stress and not urinary tract infection    Relieved by:  Nothing  Worsened by:  Nothing  Ineffective treatments:  None tried  Associated symptoms: dizziness, nausea and shortness of breath    Associated symptoms: no abdominal pain, no anorexia, no arthralgias, no ataxia, no cough, no diarrhea, no dysphagia, no dysuria, no frequency, no headaches, no loss of  consciousness, no seizures, no urgency and no vision change    Risk factors: neurologic disease and new medications    Risk factors: no anemia, no congestive heart failure, no coronary artery disease, no excessive menstruation and no heart disease        Review of Systems   Constitutional: Negative.    HENT: Negative.    Respiratory: Positive for shortness of breath. Negative for cough.    Cardiovascular: Negative.    Gastrointestinal: Positive for nausea. Negative for abdominal pain, anorexia, diarrhea and dysphagia.   Genitourinary: Negative.  Negative for dysuria, frequency and urgency.   Musculoskeletal: Negative.  Negative for arthralgias.   Skin: Negative.    Neurological: Positive for dizziness. Negative for seizures, loss of consciousness and headaches.   Psychiatric/Behavioral: Negative.    All other systems reviewed and are negative.      Past Medical History:   Diagnosis Date   • Asthma        Allergies   Allergen Reactions   • Contrast Dye Hives       Past Surgical History:   Procedure Laterality Date   • BREAST BIOPSY Right 2006   • REPLACEMENT TOTAL KNEE         Family History   Problem Relation Age of Onset   • Breast cancer Paternal Grandmother        Social History     Socioeconomic History   • Marital status: Single     Spouse name: Not on file   • Number of children: Not on file   • Years of education: Not on file   • Highest education level: Not on file   Tobacco Use   • Smoking status: Former Smoker     Types: Cigarettes     Quit date: 2018     Years since quitting: 3.6   • Smokeless tobacco: Never Used       Prior to Admission medications    Not on File       Medications - No data to display    Vitals:    09/10/21 1328   BP: 166/89   Pulse: 67   Resp: 17   Temp: 98.2 °F (36.8 °C)   SpO2: 98%         Objective   Physical Exam  Vitals and nursing note reviewed.   Constitutional:       Appearance: Normal appearance.   HENT:      Head: Normocephalic.      Mouth/Throat:      Mouth: Mucous membranes  are moist.      Pharynx: Oropharynx is clear.   Eyes:      Extraocular Movements: Extraocular movements intact.      Pupils: Pupils are equal, round, and reactive to light.   Cardiovascular:      Rate and Rhythm: Normal rate and regular rhythm.   Pulmonary:      Effort: Pulmonary effort is normal.      Breath sounds: Normal breath sounds.   Musculoskeletal:         General: Normal range of motion.      Cervical back: Normal range of motion and neck supple.   Skin:     General: Skin is warm and dry.   Neurological:      General: No focal deficit present.      Mental Status: She is alert and oriented to person, place, and time.      Comments: No neurologic deficit is noted.   Psychiatric:         Thought Content: Thought content normal.         Judgment: Judgment normal.      Comments: Patient is teary-eyed and very anxious.         Procedures         Lab Results (last 24 hours)     ** No results found for the last 24 hours. **          No orders to display       ED Course  ED Course as of Sep 10 1407   Fri Sep 10, 2021   1406 Patient wanted to know if she could just go home.  And I told everything was okay.  I told the patient the vital signs and exam here things seems to be basically stable.  She says she just scared because she was told those are symptoms of a stroke and I explained to her that they could be symptoms of multiple things including an anxiety attack.  To me this point what this fit with.  She seems to understand that and does want to go home.  I told her I could not assure her that it would not come back if she cannot control her symptoms and that there is nothing to do for the stroke anyway at this time.  I did offer to do a testing if she so desired but she decided not to.  She is discharged in stable condition.    [TR]      ED Course User Index  [TR] Inocente Concepcion Jr., MD          MDM  Number of Diagnoses or Management Options  Anxiety attack: new and does not require workup  Risk of  Complications, Morbidity, and/or Mortality  Presenting problems: moderate  Diagnostic procedures: low  Management options: moderate    Patient Progress  Patient progress: stable      Final diagnoses:   Anxiety attack          Inocente Concepcion Jr., MD  09/10/21 0841

## 2021-09-10 NOTE — PROGRESS NOTES
Clinic Care Coordination Contact  [facility]/Voicemail       Clinical Data: Care Coordinator Outreach  Outreach attempted x 1.  Left message on patient's voicemail with call back information and requested return call.   Care Coordinator will try to reach patient again in 1-2 business days.    .Cony Pisano  [phone]  Care

## 2021-09-11 NOTE — PROGRESS NOTES
Clinic Care Coordination Contact  [facility]/Voicemail       Clinical Data: Care Coordinator Outreach  Outreach attempted x 2.  Left message on patient's voicemail with call back information and requested return call.  Plan: Care Coordinator will do no further outreaches at this time.    MAK Krause  [phone]  [facility]

## 2021-09-13 LAB
QT INTERVAL: 452 MS
QTC INTERVAL: 470 MS

## 2021-09-14 ENCOUNTER — HOSPITAL ENCOUNTER (OUTPATIENT)
Dept: ULTRASOUND IMAGING | Facility: HOSPITAL | Age: 61
Discharge: HOME | End: 2021-09-14
Admitting: NURSE PRACTITIONER

## 2021-09-14 DIAGNOSIS — R42 DIZZINESS AND GIDDINESS: ICD-10-CM

## 2021-09-14 PROCEDURE — 93880 EXTRACRANIAL BILAT STUDY: CPT

## 2021-09-14 PROCEDURE — 93880 EXTRACRANIAL BILAT STUDY: CPT | Performed by: SURGERY

## 2021-10-06 ENCOUNTER — TRANSCRIBE ORDERS (OUTPATIENT)
Dept: ADMINISTRATIVE | Facility: HOSPITAL | Age: 61
End: 2021-10-06

## 2021-10-06 ENCOUNTER — HOSPITAL ENCOUNTER (OUTPATIENT)
Dept: GENERAL RADIOLOGY | Facility: HOSPITAL | Age: 61
Discharge: HOME | End: 2021-10-06
Admitting: NURSE PRACTITIONER

## 2021-10-06 DIAGNOSIS — M79.672 PAIN IN LEFT FOOT: ICD-10-CM

## 2021-10-06 DIAGNOSIS — M79.672 PAIN IN LEFT FOOT: Primary | ICD-10-CM

## 2021-10-06 PROCEDURE — 73630 X-RAY EXAM OF FOOT: CPT

## 2021-10-11 ENCOUNTER — TELEPHONE (OUTPATIENT)
Dept: CARDIOLOGY CLINIC | Age: 61
End: 2021-10-11

## 2021-10-11 NOTE — TELEPHONE ENCOUNTER
Called to acquire about previous cardiology records, and Pt stated that she thought that she was referred to see Dr. Mik Beasley at [facility] 24 stated that she was going to call [facility] office to find out if she has an appt with Dr. Mik Beasley or not.  PT stated she would call our office back to let us know if she is seeing Dr. Kosta Bernal or Dr. Mik Beasley at [facility].

## 2021-10-18 ENCOUNTER — OFFICE VISIT (OUTPATIENT)
Dept: CARDIOLOGY | Facility: CLINIC | Age: 61
End: 2021-10-18

## 2021-10-18 VITALS
BODY MASS INDEX: 19.13 KG/M2 | HEIGHT: 66 IN | SYSTOLIC BLOOD PRESSURE: 130 MMHG | HEART RATE: 68 BPM | OXYGEN SATURATION: 98 % | DIASTOLIC BLOOD PRESSURE: 90 MMHG | WEIGHT: 119 LBS

## 2021-10-18 DIAGNOSIS — I20.0 UNSTABLE ANGINA (HCC): Primary | ICD-10-CM

## 2021-10-18 DIAGNOSIS — I25.110 CORONARY ARTERY DISEASE INVOLVING NATIVE CORONARY ARTERY OF NATIVE HEART WITH UNSTABLE ANGINA PECTORIS (HCC): ICD-10-CM

## 2021-10-18 DIAGNOSIS — I25.10 CORONARY ARTERY DISEASE INVOLVING NATIVE CORONARY ARTERY OF NATIVE HEART WITHOUT ANGINA PECTORIS: ICD-10-CM

## 2021-10-18 DIAGNOSIS — I10 PRIMARY HYPERTENSION: ICD-10-CM

## 2021-10-18 DIAGNOSIS — I63.10 CEREBROVASCULAR ACCIDENT (CVA) DUE TO EMBOLISM OF PRECEREBRAL ARTERY (HCC): ICD-10-CM

## 2021-10-18 DIAGNOSIS — E78.2 MIXED HYPERLIPIDEMIA: ICD-10-CM

## 2021-10-18 PROCEDURE — 99204 OFFICE O/P NEW MOD 45 MIN: CPT | Performed by: INTERNAL MEDICINE

## 2021-10-18 PROCEDURE — 93000 ELECTROCARDIOGRAM COMPLETE: CPT | Performed by: INTERNAL MEDICINE

## 2021-10-18 RX ORDER — ONDANSETRON 4 MG/1
4 TABLET, ORALLY DISINTEGRATING ORAL AS NEEDED
COMMUNITY
Start: 2021-10-04 | End: 2022-03-09

## 2021-10-18 RX ORDER — ONDANSETRON 4 MG/1
4 TABLET, ORALLY DISINTEGRATING ORAL AS NEEDED
COMMUNITY
Start: 2021-09-13 | End: 2022-03-09

## 2021-10-18 RX ORDER — DOCUSATE SODIUM 100 MG/1
100 CAPSULE, LIQUID FILLED ORAL DAILY
COMMUNITY

## 2021-10-18 RX ORDER — ASPIRIN 81 MG/1
81 TABLET, CHEWABLE ORAL DAILY
COMMUNITY

## 2021-10-18 RX ORDER — ATORVASTATIN CALCIUM 20 MG/1
20 TABLET, FILM COATED ORAL DAILY
COMMUNITY
End: 2022-03-16 | Stop reason: DRUGHIGH

## 2021-10-18 RX ORDER — NAPROXEN SODIUM 220 MG
220 TABLET ORAL AS NEEDED
COMMUNITY
End: 2022-03-09

## 2021-10-18 RX ORDER — LISINOPRIL 2.5 MG/1
2.5 TABLET ORAL DAILY
COMMUNITY

## 2021-10-18 NOTE — PROGRESS NOTES
Referring Provider: Patience Patton APRN    Reason for Consultation: establish cardiac care    Chief complaint:   Chief Complaint   Patient presents with   • New Patient     referred by JOSEFINA Soto for evaluation of history of MI   • Coronary Artery Disease     pt had a stent placed in 2008 after a heart attack.  pt transferred here from [place] in December.  she needs to establish care.   • Hyperlipidemia     her pcp has done labs on her.  did not send.  she takes Lipitor 20 mg.   • Hypertension     pt states she checks at home and it stays stable.       Subjective .     History of present illness:  Ivy Ruff is a 60 y.o. yo female who presents today for evaluation of chest pain. She has known CAD, s/p stent placement in 2008 in [place]. She admits to chest pain with exertion for the last few weeks. Goes away with rest.  Chief Complaint   Patient presents with   • New Patient     referred by JOSEFINA Soto for evaluation of history of MI   • Coronary Artery Disease     pt had a stent placed in 2008 after a heart attack.  pt transferred here from [place] in December.  she needs to establish care.   • Hyperlipidemia     her pcp has done labs on her.  did not send.  she takes Lipitor 20 mg.   • Hypertension     pt states she checks at home and it stays stable.   .    History  Past Medical History:   Diagnosis Date   • Asthma    • COPD (chronic obstructive pulmonary disease) (HCC)    • Coronary artery disease    • Hyperlipidemia    • Hypertension    • Myocardial infarction (HCC)    • Stroke (HCC)    ,   Past Surgical History:   Procedure Laterality Date   • BREAST BIOPSY Right 2006   • CARDIAC CATHETERIZATION     • COLPOSCOPY     • CORONARY STENT PLACEMENT     • REPLACEMENT TOTAL KNEE     ,   Family History   Problem Relation Age of Onset   • Breast cancer Paternal Grandmother    • Heart disease Mother    • Pneumonia Mother    • Hyperlipidemia Mother    • Hypertension Mother    • Colon cancer  "Father    • Multiple myeloma Father    • Heart attack Father    • Heart disease Father    • Hypertension Father    • Cancer Brother    • Lung cancer Maternal Grandfather    • Heart disease Paternal Grandfather    • Heart attack Paternal Grandfather    • Diabetes Paternal Grandfather    ,   Social History     Tobacco Use   • Smoking status: Former Smoker     Types: Cigarettes     Quit date: 2018     Years since quitting: 3.7   • Smokeless tobacco: Never Used   Vaping Use   • Vaping Use: Never used   Substance Use Topics   • Alcohol use: Not Currently   • Drug use: Never   ,     Medications  Current Outpatient Medications   Medication Sig Dispense Refill   • aspirin 81 MG chewable tablet 81 mg Daily.     • atorvastatin (LIPITOR) 20 MG tablet 20 mg Daily.     • docusate sodium (Colace) 100 MG capsule 100 mg Daily.     • esomeprazole (nexIUM) 20 MG capsule 20 mg As Needed.     • lisinopril (PRINIVIL,ZESTRIL) 2.5 MG tablet 2.5 mg Daily.     • naproxen sodium (Aleve) 220 MG tablet 220 mg As Needed.     • ondansetron ODT (ZOFRAN-ODT) 4 MG disintegrating tablet 4 mg As Needed.     • ProAir  (90 Base) MCG/ACT inhaler 1 puff Daily.     • ondansetron ODT (ZOFRAN-ODT) 4 MG disintegrating tablet 4 mg As Needed.       No current facility-administered medications for this visit.        Allergies:  Contrast dye    Review of Systems  Review of Systems   HENT: Negative for nosebleeds.    Cardiovascular: Positive for chest pain. Negative for claudication, dyspnea on exertion, leg swelling, near-syncope, orthopnea, palpitations, paroxysmal nocturnal dyspnea and syncope.   Respiratory: Negative for hemoptysis and shortness of breath.    Gastrointestinal: Negative for melena.   Genitourinary: Negative for hematuria.       Objective     Physical Exam:  /90   Pulse 68   Ht 167.6 cm (66\")   Wt 54 kg (119 lb)   SpO2 98%   BMI 19.21 kg/m²   Pulmonary:      Breath sounds: Normal breath sounds.   Cardiovascular:      Murmurs: " There is a grade 1/6 harsh midsystolic murmur at the URSB, radiating to the neck.   Edema:     Peripheral edema absent.         Results Review:   I reviewed the patient's new clinical results.    ECG 12 Lead    Date/Time: 10/18/2021 11:08 AM  Performed by: Arthur Clark MD  Authorized by: Arthur Clark MD   Comparison: compared with previous ECG   Similar to previous ECG  Rhythm: sinus rhythm  Rate: normal  Conduction: conduction normal  Q waves: V1 and V2    ST Segments: ST segments normal  T Waves: T waves normal  QRS axis: normal    Clinical impression: abnormal EKG            Admission on 09/10/2021, Discharged on 09/10/2021   Component Date Value Ref Range Status   • QT Interval 09/10/2021 452  ms Final   • QTC Interval 09/10/2021 470  ms Final   • Extra Tube 09/10/2021 Hold for add-ons.   Final    Auto resulted.   • Extra Tube 09/10/2021 hold for add-on   Final    Auto resulted   • Extra Tube 09/10/2021 Hold for add-ons.   Final    Auto resulted.   • Extra Tube 09/10/2021 hold for add-on   Final    Auto resulted       Assessment/Plan   Problem List Items Addressed This Visit        Cardiac and Vasculature    Coronary artery disease involving native coronary artery of native heart with unstable angina pectoris (HCC)    Current Assessment & Plan     Having a few episodes of chest pain with activity and at rest. Will schedule a stress test         Primary hypertension    Current Assessment & Plan     Adequate control         Relevant Medications    lisinopril (PRINIVIL,ZESTRIL) 2.5 MG tablet    Mixed hyperlipidemia    Current Assessment & Plan     Patient's statin therapy is followed by PCP.           Relevant Medications    atorvastatin (LIPITOR) 20 MG tablet       Neuro    Cerebrovascular accident (CVA) due to embolism of precerebral artery (HCC)    Current Assessment & Plan     May have been cryptogenic. Will get an echo with a bubble study         Relevant Orders    Adult Transthoracic Echo Complete w/  Color, Spectral and Contrast if necessary per protocol      Other Visit Diagnoses     Unstable angina (HCC)    -  Primary    Relevant Orders    Stress Test With Myocardial Perfusion (1 Day)          Medical Complexity  must have 2 out of 3     Moderate Complexity Level 4           1 of the following medical problems:          []One chronic illness with mild exacerbation         [x]Two or more stable chronic illness          [x]One new problem  []One acute illness with systemic symptoms    Complexity of Data  Reviewed (1 out of the 3 following categories)      Category 1 tests, documents, historian (must have 3 points)       []Review of prior external records  [x]Review of results of unique tests  [x]Ordering unique tests  []Assessment requires an independent historian    Category 2 Interpretation of tests   []Independent interpretation of test read by another doc    Category 3 Discuss Management/tests  []Discussion with external physician    Risk of complications and/or morbidity          [x]   Prescription Drug Management. I agree with her medical regimen

## 2021-10-23 ENCOUNTER — APPOINTMENT (OUTPATIENT)
Dept: GENERAL RADIOLOGY | Age: 61
DRG: 480 | End: 2021-10-23
Attending: INTERNAL MEDICINE
Payer: MEDICAID

## 2021-10-23 ENCOUNTER — ANESTHESIA (OUTPATIENT)
Dept: OPERATING ROOM | Age: 61
DRG: 480 | End: 2021-10-23
Payer: MEDICAID

## 2021-10-23 ENCOUNTER — HOSPITAL ENCOUNTER (INPATIENT)
Age: 61
LOS: 14 days | Discharge: NURSING, REHAB OR LONG-TERM CARE | DRG: 480 | End: 2021-11-06
Attending: INTERNAL MEDICINE | Admitting: INTERNAL MEDICINE
Payer: MEDICAID

## 2021-10-23 ENCOUNTER — ANESTHESIA EVENT (OUTPATIENT)
Dept: OPERATING ROOM | Age: 61
DRG: 480 | End: 2021-10-23
Payer: MEDICAID

## 2021-10-23 VITALS — SYSTOLIC BLOOD PRESSURE: 150 MMHG | OXYGEN SATURATION: 83 % | TEMPERATURE: 98.1 F | DIASTOLIC BLOOD PRESSURE: 72 MMHG

## 2021-10-23 DIAGNOSIS — S72.011P: Primary | ICD-10-CM
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
## 2021-10-23 PROBLEM — S62.101A CLOSED FRACTURE OF RIGHT WRIST: Status: ACTIVE | Noted: 2021-10-23

## 2021-10-23 PROBLEM — Z72.0 TOBACCO ABUSE: Status: ACTIVE | Noted: 2021-10-23

## 2021-10-23 PROBLEM — J44.9 COPD (CHRONIC OBSTRUCTIVE PULMONARY DISEASE) (HCC): Status: ACTIVE | Noted: 2021-10-23

## 2021-10-23 PROBLEM — Z95.5 H/O HEART ARTERY STENT: Status: ACTIVE | Noted: 2021-10-23

## 2021-10-23 PROBLEM — I10 HYPERTENSION: Status: ACTIVE | Noted: 2021-10-23

## 2021-10-23 PROBLEM — I21.9 MYOCARDIAL INFARCTION (HCC): Status: ACTIVE | Noted: 2021-10-23

## 2021-10-23 PROBLEM — I63.9 CVA (CEREBRAL VASCULAR ACCIDENT) (HCC): Status: ACTIVE | Noted: 2021-10-23

## 2021-10-23 PROBLEM — E43 SEVERE MALNUTRITION (HCC): Status: ACTIVE | Noted: 2021-10-23

## 2021-10-23 PROBLEM — E78.5 HYPERLIPIDEMIA: Status: ACTIVE | Noted: 2021-10-23

## 2021-10-23 PROBLEM — T14.90XA TRAUMA: Status: ACTIVE | Noted: 2021-10-23

## 2021-10-23 LAB
ALBUMIN SERPL-MCNC: 3.2 G/DL (ref 3.5–5.2)
ALP BLD-CCNC: 52 U/L (ref 35–104)
ALT SERPL-CCNC: 9 U/L (ref 5–33)
ANION GAP SERPL CALCULATED.3IONS-SCNC: 9 MMOL/L (ref 7–19)
AST SERPL-CCNC: 15 U/L (ref 5–32)
BILIRUB SERPL-MCNC: 0.3 MG/DL (ref 0.2–1.2)
BUN BLDV-MCNC: 13 MG/DL (ref 8–23)
CALCIUM SERPL-MCNC: 7.8 MG/DL (ref 8.8–10.2)
CHLORIDE BLD-SCNC: 109 MMOL/L (ref 98–111)
CO2: 24 MMOL/L (ref 22–29)
CREAT SERPL-MCNC: 0.8 MG/DL (ref 0.5–0.9)
GFR AFRICAN AMERICAN: >59
GFR NON-AFRICAN AMERICAN: >60
GLUCOSE BLD-MCNC: 124 MG/DL (ref 74–109)
HCT VFR BLD CALC: 27.1 % (ref 37–47)
HEMOGLOBIN: 8.3 G/DL (ref 12–16)
INR BLD: 1.2 (ref 0.88–1.18)
MAGNESIUM: 1.6 MG/DL (ref 1.6–2.4)
MCH RBC QN AUTO: 29.9 PG (ref 27–31)
MCHC RBC AUTO-ENTMCNC: 30.6 G/DL (ref 33–37)
MCV RBC AUTO: 97.5 FL (ref 81–99)
PDW BLD-RTO: 14 % (ref 11.5–14.5)
PHOSPHORUS: 4.6 MG/DL (ref 2.5–4.5)
PLATELET # BLD: 244 K/UL (ref 130–400)
PMV BLD AUTO: 10.2 FL (ref 9.4–12.3)
POTASSIUM SERPL-SCNC: 4 MMOL/L (ref 3.5–5)
PROTHROMBIN TIME: 15.4 SEC (ref 12–14.6)
RBC # BLD: 2.78 M/UL (ref 4.2–5.4)
SODIUM BLD-SCNC: 142 MMOL/L (ref 136–145)
TOTAL PROTEIN: 5 G/DL (ref 6.6–8.7)
TSH REFLEX FT4: 1.16 UIU/ML (ref 0.35–5.5)
WBC # BLD: 12.1 K/UL (ref 4.8–10.8)

## 2021-10-23 PROCEDURE — 84443 ASSAY THYROID STIM HORMONE: CPT

## 2021-10-23 PROCEDURE — 3600000004 HC SURGERY LEVEL 4 BASE: Performed by: ORTHOPAEDIC SURGERY

## 2021-10-23 PROCEDURE — 3209999900 FLUORO FOR SURGICAL PROCEDURES

## 2021-10-23 PROCEDURE — C1713 ANCHOR/SCREW BN/BN,TIS/BN: HCPCS | Performed by: ORTHOPAEDIC SURGERY

## 2021-10-23 PROCEDURE — 80053 COMPREHEN METABOLIC PANEL: CPT

## 2021-10-23 PROCEDURE — 3700000001 HC ADD 15 MINUTES (ANESTHESIA): Performed by: ORTHOPAEDIC SURGERY

## 2021-10-23 PROCEDURE — 3600000014 HC SURGERY LEVEL 4 ADDTL 15MIN: Performed by: ORTHOPAEDIC SURGERY

## 2021-10-23 PROCEDURE — 6360000002 HC RX W HCPCS

## 2021-10-23 PROCEDURE — C1769 GUIDE WIRE: HCPCS | Performed by: ORTHOPAEDIC SURGERY

## 2021-10-23 PROCEDURE — 7100000000 HC PACU RECOVERY - FIRST 15 MIN: Performed by: ORTHOPAEDIC SURGERY

## 2021-10-23 PROCEDURE — 73552 X-RAY EXAM OF FEMUR 2/>: CPT

## 2021-10-23 PROCEDURE — 2580000003 HC RX 258: Performed by: INTERNAL MEDICINE

## 2021-10-23 PROCEDURE — 3700000000 HC ANESTHESIA ATTENDED CARE: Performed by: ORTHOPAEDIC SURGERY

## 2021-10-23 PROCEDURE — 93005 ELECTROCARDIOGRAM TRACING: CPT | Performed by: NURSE ANESTHETIST, CERTIFIED REGISTERED

## 2021-10-23 PROCEDURE — 6360000002 HC RX W HCPCS: Performed by: INTERNAL MEDICINE

## 2021-10-23 PROCEDURE — 2720000010 HC SURG SUPPLY STERILE: Performed by: ORTHOPAEDIC SURGERY

## 2021-10-23 PROCEDURE — 73110 X-RAY EXAM OF WRIST: CPT

## 2021-10-23 PROCEDURE — 0PSH04Z REPOSITION RIGHT RADIUS WITH INTERNAL FIXATION DEVICE, OPEN APPROACH: ICD-10-PCS | Performed by: ORTHOPAEDIC SURGERY

## 2021-10-23 PROCEDURE — 83735 ASSAY OF MAGNESIUM: CPT

## 2021-10-23 PROCEDURE — 6370000000 HC RX 637 (ALT 250 FOR IP): Performed by: ORTHOPAEDIC SURGERY

## 2021-10-23 PROCEDURE — P9045 ALBUMIN (HUMAN), 5%, 250 ML: HCPCS | Performed by: NURSE ANESTHETIST, CERTIFIED REGISTERED

## 2021-10-23 PROCEDURE — 36415 COLL VENOUS BLD VENIPUNCTURE: CPT

## 2021-10-23 PROCEDURE — 85610 PROTHROMBIN TIME: CPT

## 2021-10-23 PROCEDURE — 2500000003 HC RX 250 WO HCPCS: Performed by: NURSE ANESTHETIST, CERTIFIED REGISTERED

## 2021-10-23 PROCEDURE — 1210000000 HC MED SURG R&B

## 2021-10-23 PROCEDURE — 7100000001 HC PACU RECOVERY - ADDTL 15 MIN: Performed by: ORTHOPAEDIC SURGERY

## 2021-10-23 PROCEDURE — 6360000002 HC RX W HCPCS: Performed by: NURSE ANESTHETIST, CERTIFIED REGISTERED

## 2021-10-23 PROCEDURE — 2700000000 HC OXYGEN THERAPY PER DAY

## 2021-10-23 PROCEDURE — 6370000000 HC RX 637 (ALT 250 FOR IP): Performed by: NURSE ANESTHETIST, CERTIFIED REGISTERED

## 2021-10-23 PROCEDURE — 0QSB04Z REPOSITION RIGHT LOWER FEMUR WITH INTERNAL FIXATION DEVICE, OPEN APPROACH: ICD-10-PCS | Performed by: ORTHOPAEDIC SURGERY

## 2021-10-23 PROCEDURE — 6360000002 HC RX W HCPCS: Performed by: ORTHOPAEDIC SURGERY

## 2021-10-23 PROCEDURE — 2709999900 HC NON-CHARGEABLE SUPPLY: Performed by: ORTHOPAEDIC SURGERY

## 2021-10-23 PROCEDURE — 2580000003 HC RX 258: Performed by: NURSE ANESTHETIST, CERTIFIED REGISTERED

## 2021-10-23 PROCEDURE — 6370000000 HC RX 637 (ALT 250 FOR IP): Performed by: HOSPITALIST

## 2021-10-23 PROCEDURE — 85027 COMPLETE CBC AUTOMATED: CPT

## 2021-10-23 PROCEDURE — 6360000002 HC RX W HCPCS: Performed by: ANESTHESIOLOGY

## 2021-10-23 PROCEDURE — 84100 ASSAY OF PHOSPHORUS: CPT

## 2021-10-23 PROCEDURE — 94640 AIRWAY INHALATION TREATMENT: CPT

## 2021-10-23 PROCEDURE — 2500000003 HC RX 250 WO HCPCS: Performed by: ORTHOPAEDIC SURGERY

## 2021-10-23 PROCEDURE — 2580000003 HC RX 258: Performed by: ORTHOPAEDIC SURGERY

## 2021-10-23 PROCEDURE — 2500000003 HC RX 250 WO HCPCS: Performed by: INTERNAL MEDICINE

## 2021-10-23 DEVICE — PLATE BNE W22XL54MM STD 9 H R DST RAD VOLAR S STL VAR ANG: Type: IMPLANTABLE DEVICE | Site: WRIST | Status: FUNCTIONAL

## 2021-10-23 DEVICE — IMPLANTABLE DEVICE: Type: IMPLANTABLE DEVICE | Site: FEMUR | Status: FUNCTIONAL

## 2021-10-23 DEVICE — SCREW BNE L36MM DIA5MM CNDYL S STL ST VAR ANG LOK COMPR T25: Type: IMPLANTABLE DEVICE | Site: FEMUR | Status: FUNCTIONAL

## 2021-10-23 DEVICE — SCREW BNE L80MM DIA6.5MM THRD L16MM CANC S STL SELF DRL ST: Type: IMPLANTABLE DEVICE | Site: FEMUR | Status: FUNCTIONAL

## 2021-10-23 DEVICE — SCREW BNE L14MM DIA2.4MM CORT S STL ST T8 STARDRV RECESS: Type: IMPLANTABLE DEVICE | Site: WRIST | Status: FUNCTIONAL

## 2021-10-23 DEVICE — PIN EXT FIX L250MM DIA5MM S STL W/ CTRL THRD STNMN: Type: IMPLANTABLE DEVICE | Site: FEMUR | Status: FUNCTIONAL

## 2021-10-23 DEVICE — SCREW BNE L46MM DIA5MM CNDYL S STL ST VAR ANG LOK T25: Type: IMPLANTABLE DEVICE | Site: FEMUR | Status: FUNCTIONAL

## 2021-10-23 DEVICE — SCREW BNE L13MM DIA2.4MM CORT S STL ST T8 STARDRV RECESS: Type: IMPLANTABLE DEVICE | Site: WRIST | Status: FUNCTIONAL

## 2021-10-23 DEVICE — SCREW BNE L18MM DIA2.4MM DST RAD VOLAR S STL ST VAR ANG LOK: Type: IMPLANTABLE DEVICE | Site: WRIST | Status: FUNCTIONAL

## 2021-10-23 DEVICE — SCREW BNE L42MM DIA5MM CNDYL S STL ST VAR ANG LOK T25: Type: IMPLANTABLE DEVICE | Site: FEMUR | Status: FUNCTIONAL

## 2021-10-23 DEVICE — SCREW BNE L20MM DIA2.4MM DST RAD VOLAR S STL ST VAR ANG LOK: Type: IMPLANTABLE DEVICE | Site: WRIST | Status: FUNCTIONAL

## 2021-10-23 RX ORDER — SODIUM CHLORIDE 9 MG/ML
INJECTION, SOLUTION INTRAVENOUS CONTINUOUS
Status: DISCONTINUED | OUTPATIENT
Start: 2021-10-23 | End: 2021-10-30

## 2021-10-23 RX ORDER — METOCLOPRAMIDE HYDROCHLORIDE 5 MG/ML
10 INJECTION INTRAMUSCULAR; INTRAVENOUS
Status: DISCONTINUED | OUTPATIENT
Start: 2021-10-23 | End: 2021-10-23 | Stop reason: HOSPADM

## 2021-10-23 RX ORDER — HYDROMORPHONE HYDROCHLORIDE 1 MG/ML
0.25 INJECTION, SOLUTION INTRAMUSCULAR; INTRAVENOUS; SUBCUTANEOUS EVERY 5 MIN PRN
Status: DISCONTINUED | OUTPATIENT
Start: 2021-10-23 | End: 2021-10-23 | Stop reason: HOSPADM

## 2021-10-23 RX ORDER — ALPRAZOLAM 0.25 MG/1
0.25 TABLET ORAL 3 TIMES DAILY PRN
Status: DISCONTINUED | OUTPATIENT
Start: 2021-10-23 | End: 2021-11-06 | Stop reason: HOSPADM

## 2021-10-23 RX ORDER — MEPERIDINE HYDROCHLORIDE 25 MG/ML
12.5 INJECTION INTRAMUSCULAR; INTRAVENOUS; SUBCUTANEOUS EVERY 5 MIN PRN
Status: DISCONTINUED | OUTPATIENT
Start: 2021-10-23 | End: 2021-10-23 | Stop reason: HOSPADM

## 2021-10-23 RX ORDER — ONDANSETRON 2 MG/ML
4 INJECTION INTRAMUSCULAR; INTRAVENOUS EVERY 6 HOURS PRN
Status: DISCONTINUED | OUTPATIENT
Start: 2021-10-23 | End: 2021-11-03 | Stop reason: SDUPTHER

## 2021-10-23 RX ORDER — LABETALOL HYDROCHLORIDE 5 MG/ML
5 INJECTION, SOLUTION INTRAVENOUS EVERY 10 MIN PRN
Status: DISCONTINUED | OUTPATIENT
Start: 2021-10-23 | End: 2021-10-23 | Stop reason: HOSPADM

## 2021-10-23 RX ORDER — SODIUM CHLORIDE, SODIUM LACTATE, POTASSIUM CHLORIDE, CALCIUM CHLORIDE 600; 310; 30; 20 MG/100ML; MG/100ML; MG/100ML; MG/100ML
INJECTION, SOLUTION INTRAVENOUS CONTINUOUS
Status: CANCELLED | OUTPATIENT
Start: 2021-10-23

## 2021-10-23 RX ORDER — LISINOPRIL 2.5 MG/1
2.5 TABLET ORAL DAILY
Status: DISCONTINUED | OUTPATIENT
Start: 2021-10-23 | End: 2021-11-06 | Stop reason: HOSPADM

## 2021-10-23 RX ORDER — PROPOFOL 10 MG/ML
INJECTION, EMULSION INTRAVENOUS PRN
Status: DISCONTINUED | OUTPATIENT
Start: 2021-10-23 | End: 2021-10-23 | Stop reason: SDUPTHER

## 2021-10-23 RX ORDER — SODIUM CHLORIDE, SODIUM LACTATE, POTASSIUM CHLORIDE, CALCIUM CHLORIDE 600; 310; 30; 20 MG/100ML; MG/100ML; MG/100ML; MG/100ML
INJECTION, SOLUTION INTRAVENOUS CONTINUOUS PRN
Status: DISCONTINUED | OUTPATIENT
Start: 2021-10-23 | End: 2021-10-23 | Stop reason: SDUPTHER

## 2021-10-23 RX ORDER — LIDOCAINE HYDROCHLORIDE 10 MG/ML
1 INJECTION, SOLUTION EPIDURAL; INFILTRATION; INTRACAUDAL; PERINEURAL
Status: CANCELLED | OUTPATIENT
Start: 2021-10-23 | End: 2021-10-23

## 2021-10-23 RX ORDER — DEXAMETHASONE SODIUM PHOSPHATE 10 MG/ML
INJECTION, SOLUTION INTRAMUSCULAR; INTRAVENOUS PRN
Status: DISCONTINUED | OUTPATIENT
Start: 2021-10-23 | End: 2021-10-23 | Stop reason: SDUPTHER

## 2021-10-23 RX ORDER — CEFAZOLIN SODIUM 1 G/3ML
INJECTION, POWDER, FOR SOLUTION INTRAMUSCULAR; INTRAVENOUS PRN
Status: DISCONTINUED | OUTPATIENT
Start: 2021-10-23 | End: 2021-10-23 | Stop reason: SDUPTHER

## 2021-10-23 RX ORDER — IPRATROPIUM BROMIDE AND ALBUTEROL SULFATE 2.5; .5 MG/3ML; MG/3ML
1 SOLUTION RESPIRATORY (INHALATION) ONCE
Status: COMPLETED | OUTPATIENT
Start: 2021-10-23 | End: 2021-10-23

## 2021-10-23 RX ORDER — HYDROMORPHONE HYDROCHLORIDE 1 MG/ML
0.5 INJECTION, SOLUTION INTRAMUSCULAR; INTRAVENOUS; SUBCUTANEOUS
Status: DISCONTINUED | OUTPATIENT
Start: 2021-10-23 | End: 2021-10-31

## 2021-10-23 RX ORDER — ALBUMIN, HUMAN INJ 5% 5 %
SOLUTION INTRAVENOUS PRN
Status: DISCONTINUED | OUTPATIENT
Start: 2021-10-23 | End: 2021-10-23 | Stop reason: SDUPTHER

## 2021-10-23 RX ORDER — SODIUM CHLORIDE 0.9 % (FLUSH) 0.9 %
5-40 SYRINGE (ML) INJECTION EVERY 12 HOURS SCHEDULED
Status: DISCONTINUED | OUTPATIENT
Start: 2021-10-23 | End: 2021-11-02 | Stop reason: SDUPTHER

## 2021-10-23 RX ORDER — NALOXONE HYDROCHLORIDE 0.4 MG/ML
0.4 INJECTION, SOLUTION INTRAMUSCULAR; INTRAVENOUS; SUBCUTANEOUS ONCE
Status: COMPLETED | OUTPATIENT
Start: 2021-10-23 | End: 2021-10-23

## 2021-10-23 RX ORDER — 0.9 % SODIUM CHLORIDE 0.9 %
500 INTRAVENOUS SOLUTION INTRAVENOUS ONCE
Status: COMPLETED | OUTPATIENT
Start: 2021-10-23 | End: 2021-10-23

## 2021-10-23 RX ORDER — SODIUM CHLORIDE 9 MG/ML
25 INJECTION, SOLUTION INTRAVENOUS PRN
Status: DISCONTINUED | OUTPATIENT
Start: 2021-10-23 | End: 2021-11-02 | Stop reason: SDUPTHER

## 2021-10-23 RX ORDER — KETOROLAC TROMETHAMINE 30 MG/ML
30 INJECTION, SOLUTION INTRAMUSCULAR; INTRAVENOUS ONCE
Status: DISCONTINUED | OUTPATIENT
Start: 2021-10-23 | End: 2021-10-23

## 2021-10-23 RX ORDER — NALOXONE HYDROCHLORIDE 0.4 MG/ML
INJECTION, SOLUTION INTRAMUSCULAR; INTRAVENOUS; SUBCUTANEOUS
Status: COMPLETED
Start: 2021-10-23 | End: 2021-10-23

## 2021-10-23 RX ORDER — HYDROMORPHONE HYDROCHLORIDE 1 MG/ML
1 INJECTION, SOLUTION INTRAMUSCULAR; INTRAVENOUS; SUBCUTANEOUS EVERY 4 HOURS PRN
Status: DISCONTINUED | OUTPATIENT
Start: 2021-10-23 | End: 2021-10-31

## 2021-10-23 RX ORDER — ALBUTEROL SULFATE 90 UG/1
POWDER, METERED RESPIRATORY (INHALATION) EVERY 4 HOURS PRN
COMMUNITY

## 2021-10-23 RX ORDER — FENTANYL CITRATE 50 UG/ML
INJECTION, SOLUTION INTRAMUSCULAR; INTRAVENOUS PRN
Status: DISCONTINUED | OUTPATIENT
Start: 2021-10-23 | End: 2021-10-23 | Stop reason: SDUPTHER

## 2021-10-23 RX ORDER — MORPHINE SULFATE 2 MG/ML
2 INJECTION, SOLUTION INTRAMUSCULAR; INTRAVENOUS EVERY 5 MIN PRN
Status: DISCONTINUED | OUTPATIENT
Start: 2021-10-23 | End: 2021-10-23 | Stop reason: HOSPADM

## 2021-10-23 RX ORDER — OXYCODONE HYDROCHLORIDE 5 MG/1
10 TABLET ORAL EVERY 4 HOURS PRN
Status: DISCONTINUED | OUTPATIENT
Start: 2021-10-23 | End: 2021-11-06 | Stop reason: HOSPADM

## 2021-10-23 RX ORDER — SODIUM CHLORIDE 9 MG/ML
25 INJECTION, SOLUTION INTRAVENOUS PRN
Status: CANCELLED | OUTPATIENT
Start: 2021-10-23

## 2021-10-23 RX ORDER — LISINOPRIL 2.5 MG/1
2.5 TABLET ORAL DAILY
Status: ON HOLD | COMMUNITY
End: 2021-11-06 | Stop reason: HOSPADM

## 2021-10-23 RX ORDER — ROCURONIUM BROMIDE 10 MG/ML
INJECTION, SOLUTION INTRAVENOUS PRN
Status: DISCONTINUED | OUTPATIENT
Start: 2021-10-23 | End: 2021-10-23 | Stop reason: SDUPTHER

## 2021-10-23 RX ORDER — IPRATROPIUM BROMIDE AND ALBUTEROL SULFATE 2.5; .5 MG/3ML; MG/3ML
1 SOLUTION RESPIRATORY (INHALATION) EVERY 4 HOURS PRN
Status: DISCONTINUED | OUTPATIENT
Start: 2021-10-23 | End: 2021-11-06 | Stop reason: HOSPADM

## 2021-10-23 RX ORDER — MIDAZOLAM HYDROCHLORIDE 1 MG/ML
2 INJECTION INTRAMUSCULAR; INTRAVENOUS
Status: CANCELLED | OUTPATIENT
Start: 2021-10-23 | End: 2021-10-23

## 2021-10-23 RX ORDER — HYDROMORPHONE HYDROCHLORIDE 1 MG/ML
0.5 INJECTION, SOLUTION INTRAMUSCULAR; INTRAVENOUS; SUBCUTANEOUS EVERY 5 MIN PRN
Status: DISCONTINUED | OUTPATIENT
Start: 2021-10-23 | End: 2021-10-23 | Stop reason: HOSPADM

## 2021-10-23 RX ORDER — ACETAMINOPHEN 500 MG
500 TABLET ORAL EVERY 6 HOURS PRN
Status: DISCONTINUED | OUTPATIENT
Start: 2021-10-23 | End: 2021-11-06 | Stop reason: HOSPADM

## 2021-10-23 RX ORDER — ONDANSETRON 2 MG/ML
INJECTION INTRAMUSCULAR; INTRAVENOUS PRN
Status: DISCONTINUED | OUTPATIENT
Start: 2021-10-23 | End: 2021-10-23 | Stop reason: SDUPTHER

## 2021-10-23 RX ORDER — MIDAZOLAM HYDROCHLORIDE 1 MG/ML
INJECTION INTRAMUSCULAR; INTRAVENOUS PRN
Status: DISCONTINUED | OUTPATIENT
Start: 2021-10-23 | End: 2021-10-23 | Stop reason: SDUPTHER

## 2021-10-23 RX ORDER — SODIUM CHLORIDE 0.9 % (FLUSH) 0.9 %
5-40 SYRINGE (ML) INJECTION EVERY 12 HOURS SCHEDULED
Status: CANCELLED | OUTPATIENT
Start: 2021-10-23

## 2021-10-23 RX ORDER — PROMETHAZINE HYDROCHLORIDE 25 MG/ML
6.25 INJECTION, SOLUTION INTRAMUSCULAR; INTRAVENOUS
Status: DISCONTINUED | OUTPATIENT
Start: 2021-10-23 | End: 2021-10-23 | Stop reason: HOSPADM

## 2021-10-23 RX ORDER — ALBUTEROL SULFATE 2.5 MG/3ML
2.5 SOLUTION RESPIRATORY (INHALATION) EVERY 6 HOURS PRN
Status: DISCONTINUED | OUTPATIENT
Start: 2021-10-23 | End: 2021-11-06 | Stop reason: HOSPADM

## 2021-10-23 RX ORDER — HYDRALAZINE HYDROCHLORIDE 20 MG/ML
5 INJECTION INTRAMUSCULAR; INTRAVENOUS EVERY 10 MIN PRN
Status: DISCONTINUED | OUTPATIENT
Start: 2021-10-23 | End: 2021-10-23 | Stop reason: HOSPADM

## 2021-10-23 RX ORDER — DIPHENHYDRAMINE HYDROCHLORIDE 50 MG/ML
12.5 INJECTION INTRAMUSCULAR; INTRAVENOUS
Status: DISCONTINUED | OUTPATIENT
Start: 2021-10-23 | End: 2021-10-23 | Stop reason: HOSPADM

## 2021-10-23 RX ORDER — OXYCODONE HYDROCHLORIDE 5 MG/1
5 TABLET ORAL EVERY 4 HOURS PRN
Status: DISCONTINUED | OUTPATIENT
Start: 2021-10-23 | End: 2021-11-06 | Stop reason: HOSPADM

## 2021-10-23 RX ORDER — ATORVASTATIN CALCIUM 20 MG/1
20 TABLET, FILM COATED ORAL DAILY
Status: DISCONTINUED | OUTPATIENT
Start: 2021-10-23 | End: 2021-11-06 | Stop reason: HOSPADM

## 2021-10-23 RX ORDER — MORPHINE SULFATE 4 MG/ML
4 INJECTION, SOLUTION INTRAMUSCULAR; INTRAVENOUS EVERY 5 MIN PRN
Status: DISCONTINUED | OUTPATIENT
Start: 2021-10-23 | End: 2021-10-23 | Stop reason: HOSPADM

## 2021-10-23 RX ORDER — ONDANSETRON 2 MG/ML
4 INJECTION INTRAMUSCULAR; INTRAVENOUS EVERY 6 HOURS PRN
Status: DISCONTINUED | OUTPATIENT
Start: 2021-10-23 | End: 2021-11-06 | Stop reason: HOSPADM

## 2021-10-23 RX ORDER — LIDOCAINE HYDROCHLORIDE 10 MG/ML
INJECTION, SOLUTION EPIDURAL; INFILTRATION; INTRACAUDAL; PERINEURAL PRN
Status: DISCONTINUED | OUTPATIENT
Start: 2021-10-23 | End: 2021-10-23 | Stop reason: SDUPTHER

## 2021-10-23 RX ORDER — NALOXONE HYDROCHLORIDE 0.4 MG/ML
0.4 INJECTION, SOLUTION INTRAMUSCULAR; INTRAVENOUS; SUBCUTANEOUS PRN
Status: DISCONTINUED | OUTPATIENT
Start: 2021-10-23 | End: 2021-11-06 | Stop reason: HOSPADM

## 2021-10-23 RX ORDER — CYCLOBENZAPRINE HCL 10 MG
10 TABLET ORAL 3 TIMES DAILY PRN
Status: DISCONTINUED | OUTPATIENT
Start: 2021-10-23 | End: 2021-11-06 | Stop reason: HOSPADM

## 2021-10-23 RX ORDER — SODIUM CHLORIDE 0.9 % (FLUSH) 0.9 %
5-40 SYRINGE (ML) INJECTION PRN
Status: DISCONTINUED | OUTPATIENT
Start: 2021-10-23 | End: 2021-11-02 | Stop reason: SDUPTHER

## 2021-10-23 RX ORDER — ATORVASTATIN CALCIUM 20 MG/1
20 TABLET, FILM COATED ORAL DAILY
COMMUNITY

## 2021-10-23 RX ORDER — ONDANSETRON 4 MG/1
4 TABLET, ORALLY DISINTEGRATING ORAL EVERY 8 HOURS PRN
Status: DISCONTINUED | OUTPATIENT
Start: 2021-10-23 | End: 2021-11-06 | Stop reason: HOSPADM

## 2021-10-23 RX ORDER — FENTANYL CITRATE 50 UG/ML
50 INJECTION, SOLUTION INTRAMUSCULAR; INTRAVENOUS
Status: CANCELLED | OUTPATIENT
Start: 2021-10-23 | End: 2021-10-23

## 2021-10-23 RX ORDER — MORPHINE SULFATE 4 MG/ML
4 INJECTION, SOLUTION INTRAMUSCULAR; INTRAVENOUS
Status: CANCELLED | OUTPATIENT
Start: 2021-10-23 | End: 2021-10-23

## 2021-10-23 RX ORDER — SCOLOPAMINE TRANSDERMAL SYSTEM 1 MG/1
1 PATCH, EXTENDED RELEASE TRANSDERMAL ONCE
Status: CANCELLED | OUTPATIENT
Start: 2021-10-23 | End: 2021-10-23

## 2021-10-23 RX ORDER — EPHEDRINE SULFATE 50 MG/ML
INJECTION, SOLUTION INTRAVENOUS PRN
Status: DISCONTINUED | OUTPATIENT
Start: 2021-10-23 | End: 2021-10-23 | Stop reason: SDUPTHER

## 2021-10-23 RX ORDER — SODIUM CHLORIDE 0.9 % (FLUSH) 0.9 %
5-40 SYRINGE (ML) INJECTION PRN
Status: CANCELLED | OUTPATIENT
Start: 2021-10-23

## 2021-10-23 RX ADMIN — HYDROMORPHONE HYDROCHLORIDE 0.5 MG: 1 INJECTION, SOLUTION INTRAMUSCULAR; INTRAVENOUS; SUBCUTANEOUS at 15:45

## 2021-10-23 RX ADMIN — NALOXONE HYDROCHLORIDE 0.4 MG: 0.4 INJECTION, SOLUTION INTRAMUSCULAR; INTRAVENOUS; SUBCUTANEOUS at 19:35

## 2021-10-23 RX ADMIN — HYDROMORPHONE HYDROCHLORIDE 1 MG: 1 INJECTION, SOLUTION INTRAMUSCULAR; INTRAVENOUS; SUBCUTANEOUS at 08:42

## 2021-10-23 RX ADMIN — PHENYLEPHRINE HYDROCHLORIDE 100 MCG: 10 INJECTION INTRAVENOUS at 12:43

## 2021-10-23 RX ADMIN — ONDANSETRON HYDROCHLORIDE 4 MG: 2 INJECTION, SOLUTION INTRAMUSCULAR; INTRAVENOUS at 15:05

## 2021-10-23 RX ADMIN — SODIUM CHLORIDE, PRESERVATIVE FREE 10 ML: 5 INJECTION INTRAVENOUS at 20:45

## 2021-10-23 RX ADMIN — MIDAZOLAM 2 MG: 1 INJECTION INTRAMUSCULAR; INTRAVENOUS at 12:26

## 2021-10-23 RX ADMIN — FAMOTIDINE 20 MG: 10 INJECTION, SOLUTION INTRAVENOUS at 20:26

## 2021-10-23 RX ADMIN — FENTANYL CITRATE 50 MCG: 50 INJECTION, SOLUTION INTRAMUSCULAR; INTRAVENOUS at 13:05

## 2021-10-23 RX ADMIN — SODIUM CHLORIDE, SODIUM LACTATE, POTASSIUM CHLORIDE, AND CALCIUM CHLORIDE: 600; 310; 30; 20 INJECTION, SOLUTION INTRAVENOUS at 12:26

## 2021-10-23 RX ADMIN — PROPOFOL 150 MG: 10 INJECTION, EMULSION INTRAVENOUS at 12:31

## 2021-10-23 RX ADMIN — HYDROMORPHONE HYDROCHLORIDE 0.5 MG: 1 INJECTION, SOLUTION INTRAMUSCULAR; INTRAVENOUS; SUBCUTANEOUS at 16:03

## 2021-10-23 RX ADMIN — SODIUM CHLORIDE: 9 INJECTION, SOLUTION INTRAVENOUS at 17:38

## 2021-10-23 RX ADMIN — ALBUMIN (HUMAN) 250 ML: 2.5 SOLUTION INTRAVENOUS at 14:28

## 2021-10-23 RX ADMIN — EPHEDRINE SULFATE 10 MG: 50 INJECTION INTRAMUSCULAR; INTRAVENOUS; SUBCUTANEOUS at 13:21

## 2021-10-23 RX ADMIN — FENTANYL CITRATE 100 MCG: 50 INJECTION, SOLUTION INTRAMUSCULAR; INTRAVENOUS at 13:52

## 2021-10-23 RX ADMIN — HYDROMORPHONE HYDROCHLORIDE 1 MG: 1 INJECTION, SOLUTION INTRAMUSCULAR; INTRAVENOUS; SUBCUTANEOUS at 17:02

## 2021-10-23 RX ADMIN — FENTANYL CITRATE 100 MCG: 50 INJECTION, SOLUTION INTRAMUSCULAR; INTRAVENOUS at 12:28

## 2021-10-23 RX ADMIN — NALXONE HYDROCHLORIDE 0.4 MG: 0.4 INJECTION INTRAMUSCULAR; INTRAVENOUS; SUBCUTANEOUS at 19:35

## 2021-10-23 RX ADMIN — SODIUM CHLORIDE 500 ML: 9 INJECTION, SOLUTION INTRAVENOUS at 03:48

## 2021-10-23 RX ADMIN — HYDROMORPHONE HYDROCHLORIDE 1 MG: 1 INJECTION, SOLUTION INTRAMUSCULAR; INTRAVENOUS; SUBCUTANEOUS at 03:57

## 2021-10-23 RX ADMIN — LIDOCAINE HYDROCHLORIDE 50 MG: 10 INJECTION, SOLUTION EPIDURAL; INFILTRATION; INTRACAUDAL; PERINEURAL at 12:31

## 2021-10-23 RX ADMIN — SUGAMMADEX 200 MG: 100 INJECTION, SOLUTION INTRAVENOUS at 15:31

## 2021-10-23 RX ADMIN — Medication 2000 MG: at 20:26

## 2021-10-23 RX ADMIN — PHENYLEPHRINE HYDROCHLORIDE 100 MCG: 10 INJECTION INTRAVENOUS at 12:52

## 2021-10-23 RX ADMIN — OXYCODONE 5 MG: 5 TABLET ORAL at 20:25

## 2021-10-23 RX ADMIN — HYDROMORPHONE HYDROCHLORIDE 0.25 MG: 1 INJECTION, SOLUTION INTRAMUSCULAR; INTRAVENOUS; SUBCUTANEOUS at 15:01

## 2021-10-23 RX ADMIN — HYDROMORPHONE HYDROCHLORIDE 0.25 MG: 1 INJECTION, SOLUTION INTRAMUSCULAR; INTRAVENOUS; SUBCUTANEOUS at 15:48

## 2021-10-23 RX ADMIN — DEXAMETHASONE SODIUM PHOSPHATE 10 MG: 10 INJECTION, SOLUTION INTRAMUSCULAR; INTRAVENOUS at 12:51

## 2021-10-23 RX ADMIN — SODIUM CHLORIDE, SODIUM LACTATE, POTASSIUM CHLORIDE, AND CALCIUM CHLORIDE: 600; 310; 30; 20 INJECTION, SOLUTION INTRAVENOUS at 13:32

## 2021-10-23 RX ADMIN — CEFAZOLIN SODIUM 2000 MG: 1 INJECTION, POWDER, FOR SOLUTION INTRAMUSCULAR; INTRAVENOUS at 12:48

## 2021-10-23 RX ADMIN — ROCURONIUM BROMIDE 70 MG: 10 INJECTION, SOLUTION INTRAVENOUS at 12:32

## 2021-10-23 RX ADMIN — HYDROMORPHONE HYDROCHLORIDE 0.5 MG: 1 INJECTION, SOLUTION INTRAMUSCULAR; INTRAVENOUS; SUBCUTANEOUS at 16:16

## 2021-10-23 RX ADMIN — FAMOTIDINE 20 MG: 10 INJECTION, SOLUTION INTRAVENOUS at 08:42

## 2021-10-23 RX ADMIN — IPRATROPIUM BROMIDE AND ALBUTEROL SULFATE 1 AMPULE: .5; 3 SOLUTION RESPIRATORY (INHALATION) at 12:20

## 2021-10-23 ASSESSMENT — PAIN SCALES - GENERAL
PAINLEVEL_OUTOF10: 10
PAINLEVEL_OUTOF10: 10
PAINLEVEL_OUTOF10: 7
PAINLEVEL_OUTOF10: 10
PAINLEVEL_OUTOF10: 9
PAINLEVEL_OUTOF10: 8

## 2021-10-23 ASSESSMENT — PAIN DESCRIPTION - PAIN TYPE
TYPE: SURGICAL PAIN
TYPE: SURGICAL PAIN

## 2021-10-23 ASSESSMENT — PAIN DESCRIPTION - LOCATION
LOCATION: LEG
LOCATION: LEG

## 2021-10-23 ASSESSMENT — ENCOUNTER SYMPTOMS
SHORTNESS OF BREATH: 0
NAUSEA: 1
ALLERGIC/IMMUNOLOGIC NEGATIVE: 1
BACK PAIN: 1
COUGH: 1
EYES NEGATIVE: 1

## 2021-10-23 ASSESSMENT — PAIN DESCRIPTION - ORIENTATION
ORIENTATION: RIGHT
ORIENTATION: RIGHT

## 2021-10-23 ASSESSMENT — COPD QUESTIONNAIRES: CAT_SEVERITY: MODERATE

## 2021-10-23 ASSESSMENT — LIFESTYLE VARIABLES: SMOKING_STATUS: 1
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
## 2021-10-23 NOTE — ANESTHESIA POSTPROCEDURE EVALUATION
Department of Anesthesiology  Postprocedure Note    Patient: Ranell Galeazzi  MRN: [id number]  YOB: 1960  Date of evaluation: 10/23/2021  Time:  3:54 PM     Procedure Summary     Date: 10/23/21 Room / Location: [address]    Anesthesia Start: 1226 Anesthesia Stop: 4088    Procedures: FEMUR OPEN REDUCTION INTERNAL FIXATION BONE GRAFT      WRIST OPEN REDUCTION INTERNAL FIXATION (Right ) Diagnosis:       (Femur fracture)      (Wrist Fracture)    Surgeons: Sherran Hamman, MD Responsible Provider: GOLDY Lazo CRNA    Anesthesia Type: general ASA Status: 3 - Emergent          Anesthesia Type: general    Bobby Phase I: Bobby Score: 8    Bobby Phase II:      Last vitals: Reviewed and per EMR flowsheets.        Anesthesia Post Evaluation    Patient location during evaluation: PACU  Patient participation: complete - patient participated  Level of consciousness: sleepy but conscious  Pain score: 0  Airway patency: patent  Nausea & Vomiting: no nausea and no vomiting  Complications: no  Cardiovascular status: hemodynamically stable  Respiratory status: acceptable  Hydration status: stable
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
## 2021-10-23 NOTE — PLAN OF CARE
Nutrition Problem #1: Inadequate protein-energy intake  Intervention: Food and/or Nutrient Delivery: Continue NPO  Nutritional Goals: diet advanced with good tolerance. PO intake 50% or greater.  Weight stable or icrease 1-5# per week

## 2021-10-23 NOTE — BRIEF OP NOTE
Brief Postoperative Note      Patient:  Ranell Galeazzi  YOB: 1960  MRN: [id number]    Date of Procedure: 10/23/2021    Pre-Op Diagnosis: Femur fracture  Wrist Fracture    Post-Op Diagnosis: Same       Procedures:  ORIF of a right distal radius fracture                        ORIF of a right intra-articular distal femur fracture  Surgeon(s):  Sherran Hamman, MD    Assistant: Todd Jurado PA-C  Anesthesia: General    Estimated Blood Loss (mL): 656     Complications: None        Electronically signed by Sherran Hamman, MD on 10/23/2021 at 12:01 PM
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
## 2021-10-23 NOTE — PROGRESS NOTES
Pt arrived at 0100. Pt states that she takes the following medications,but does not know the dosage:    Lisinopril  Lipitor  Asprin  Nexium  Ibuprofen    Pt states that she uses [employer]. Medications will need to be verified, will pass on information to day shift.
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
## 2021-10-24 PROBLEM — D64.9 POSTOPERATIVE ANEMIA: Status: ACTIVE | Noted: 2021-10-24

## 2021-10-24 LAB
ABO/RH: NORMAL
ANION GAP SERPL CALCULATED.3IONS-SCNC: 9 MMOL/L (ref 7–19)
ANTIBODY SCREEN: NORMAL
BASOPHILS ABSOLUTE: 0 K/UL (ref 0–0.2)
BASOPHILS RELATIVE PERCENT: 0.1 % (ref 0–1)
BLOOD BANK DISPENSE STATUS: NORMAL
BLOOD BANK PRODUCT CODE: NORMAL
BPU ID: NORMAL
BUN BLDV-MCNC: 11 MG/DL (ref 8–23)
CALCIUM SERPL-MCNC: 7.7 MG/DL (ref 8.8–10.2)
CHLORIDE BLD-SCNC: 110 MMOL/L (ref 98–111)
CO2: 22 MMOL/L (ref 22–29)
CREAT SERPL-MCNC: 0.7 MG/DL (ref 0.5–0.9)
DESCRIPTION BLOOD BANK: NORMAL
EOSINOPHILS ABSOLUTE: 0 K/UL (ref 0–0.6)
EOSINOPHILS RELATIVE PERCENT: 0 % (ref 0–5)
GFR AFRICAN AMERICAN: >59
GFR NON-AFRICAN AMERICAN: >60
GLUCOSE BLD-MCNC: 120 MG/DL (ref 74–109)
HCT VFR BLD CALC: 22.5 % (ref 37–47)
HCT VFR BLD CALC: 30.4 % (ref 37–47)
HEMOGLOBIN: 6.9 G/DL (ref 12–16)
HEMOGLOBIN: 8.6 G/DL (ref 12–16)
IMMATURE GRANULOCYTES #: 0 K/UL
LYMPHOCYTES ABSOLUTE: 1.1 K/UL (ref 1.1–4.5)
LYMPHOCYTES RELATIVE PERCENT: 8.4 % (ref 20–40)
MCH RBC QN AUTO: 29.9 PG (ref 27–31)
MCHC RBC AUTO-ENTMCNC: 30.7 G/DL (ref 33–37)
MCV RBC AUTO: 97.4 FL (ref 81–99)
MONOCYTES ABSOLUTE: 1.1 K/UL (ref 0–0.9)
MONOCYTES RELATIVE PERCENT: 8.4 % (ref 0–10)
NEUTROPHILS ABSOLUTE: 10.9 K/UL (ref 1.5–7.5)
NEUTROPHILS RELATIVE PERCENT: 82.8 % (ref 50–65)
PDW BLD-RTO: 14.4 % (ref 11.5–14.5)
PLATELET # BLD: 196 K/UL (ref 130–400)
PMV BLD AUTO: 11 FL (ref 9.4–12.3)
POTASSIUM SERPL-SCNC: 4 MMOL/L (ref 3.5–5)
RBC # BLD: 2.31 M/UL (ref 4.2–5.4)
SODIUM BLD-SCNC: 141 MMOL/L (ref 136–145)
WBC # BLD: 13.2 K/UL (ref 4.8–10.8)

## 2021-10-24 PROCEDURE — P9016 RBC LEUKOCYTES REDUCED: HCPCS

## 2021-10-24 PROCEDURE — 86923 COMPATIBILITY TEST ELECTRIC: CPT

## 2021-10-24 PROCEDURE — 86901 BLOOD TYPING SEROLOGIC RH(D): CPT

## 2021-10-24 PROCEDURE — 6370000000 HC RX 637 (ALT 250 FOR IP): Performed by: HOSPITALIST

## 2021-10-24 PROCEDURE — 36415 COLL VENOUS BLD VENIPUNCTURE: CPT

## 2021-10-24 PROCEDURE — 2580000003 HC RX 258: Performed by: ORTHOPAEDIC SURGERY

## 2021-10-24 PROCEDURE — 86900 BLOOD TYPING SEROLOGIC ABO: CPT

## 2021-10-24 PROCEDURE — 2700000000 HC OXYGEN THERAPY PER DAY

## 2021-10-24 PROCEDURE — 97162 PT EVAL MOD COMPLEX 30 MIN: CPT

## 2021-10-24 PROCEDURE — 85025 COMPLETE CBC W/AUTO DIFF WBC: CPT

## 2021-10-24 PROCEDURE — 85018 HEMOGLOBIN: CPT

## 2021-10-24 PROCEDURE — 36430 TRANSFUSION BLD/BLD COMPNT: CPT

## 2021-10-24 PROCEDURE — 6360000002 HC RX W HCPCS: Performed by: ORTHOPAEDIC SURGERY

## 2021-10-24 PROCEDURE — 85014 HEMATOCRIT: CPT

## 2021-10-24 PROCEDURE — 6370000000 HC RX 637 (ALT 250 FOR IP): Performed by: ORTHOPAEDIC SURGERY

## 2021-10-24 PROCEDURE — 80048 BASIC METABOLIC PNL TOTAL CA: CPT

## 2021-10-24 PROCEDURE — 2500000003 HC RX 250 WO HCPCS: Performed by: ORTHOPAEDIC SURGERY

## 2021-10-24 PROCEDURE — 86850 RBC ANTIBODY SCREEN: CPT

## 2021-10-24 PROCEDURE — 97530 THERAPEUTIC ACTIVITIES: CPT

## 2021-10-24 PROCEDURE — 1210000000 HC MED SURG R&B

## 2021-10-24 RX ORDER — SODIUM CHLORIDE 9 MG/ML
INJECTION, SOLUTION INTRAVENOUS PRN
Status: DISCONTINUED | OUTPATIENT
Start: 2021-10-24 | End: 2021-11-06 | Stop reason: HOSPADM

## 2021-10-24 RX ORDER — NICOTINE 21 MG/24HR
1 PATCH, TRANSDERMAL 24 HOURS TRANSDERMAL DAILY
Status: DISCONTINUED | OUTPATIENT
Start: 2021-10-24 | End: 2021-11-06 | Stop reason: HOSPADM

## 2021-10-24 RX ADMIN — OXYCODONE 10 MG: 5 TABLET ORAL at 12:12

## 2021-10-24 RX ADMIN — OXYCODONE 10 MG: 5 TABLET ORAL at 21:15

## 2021-10-24 RX ADMIN — OXYCODONE 5 MG: 5 TABLET ORAL at 04:44

## 2021-10-24 RX ADMIN — FAMOTIDINE 20 MG: 10 INJECTION, SOLUTION INTRAVENOUS at 20:51

## 2021-10-24 RX ADMIN — ATORVASTATIN CALCIUM 20 MG: 20 TABLET, FILM COATED ORAL at 08:13

## 2021-10-24 RX ADMIN — LISINOPRIL 2.5 MG: 2.5 TABLET ORAL at 08:13

## 2021-10-24 RX ADMIN — ENOXAPARIN SODIUM 40 MG: 40 INJECTION SUBCUTANEOUS at 08:12

## 2021-10-24 RX ADMIN — FAMOTIDINE 20 MG: 10 INJECTION, SOLUTION INTRAVENOUS at 08:12

## 2021-10-24 RX ADMIN — SODIUM CHLORIDE: 9 INJECTION, SOLUTION INTRAVENOUS at 04:02

## 2021-10-24 RX ADMIN — Medication 2000 MG: at 04:44

## 2021-10-24 RX ADMIN — SODIUM CHLORIDE, PRESERVATIVE FREE 10 ML: 5 INJECTION INTRAVENOUS at 20:51

## 2021-10-24 RX ADMIN — ACETAMINOPHEN 500 MG: 500 TABLET ORAL at 08:13

## 2021-10-24 ASSESSMENT — PAIN SCALES - GENERAL
PAINLEVEL_OUTOF10: 8
PAINLEVEL_OUTOF10: 7
PAINLEVEL_OUTOF10: 9

## 2021-10-24 ASSESSMENT — PAIN DESCRIPTION - PAIN TYPE: TYPE: SURGICAL PAIN;ACUTE PAIN

## 2021-10-24 ASSESSMENT — PAIN DESCRIPTION - LOCATION: LOCATION: LEG;RIB CAGE;ARM

## 2021-10-24 ASSESSMENT — PAIN - FUNCTIONAL ASSESSMENT: PAIN_FUNCTIONAL_ASSESSMENT: PREVENTS OR INTERFERES SOME ACTIVE ACTIVITIES AND ADLS

## 2021-10-24 ASSESSMENT — PAIN DESCRIPTION - ORIENTATION: ORIENTATION: RIGHT

## 2021-10-24 ASSESSMENT — PAIN DESCRIPTION - DESCRIPTORS: DESCRIPTORS: SORE;JABBING
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
## 2021-10-24 NOTE — SIGNIFICANT EVENT
Asked by Dr Jaqueline Jiang to check on patient   As she was not responding normally and she had near syncope    Went to see her, she is in a terrible mood and angry at everyone and nothing anyone can do will make her happy today. However, we will  Continue to oblige. Ortho gave orders for narcan     She is so upset   \"that doctor told me he would be back with a soda and water for me, and he never came back. \"     She is irritated. She is in pain   She is thirsty   She cannot move herself in bed without help     She wants the head of bed elevated she cannot remember how to do it. Vital signs stable   Alert and oriented and very upset. Oral clear  cv rrr  Lungs diminished breath sounds noted. No wheezes   abd bowel sounds noted. A/P:  Diffuse pain   And pain uncontrolled and dehydration   And mouth is terrifically dry and wants something to drink    Went and got her \"regular soda and not sugar free soda\"   Talked to her and calmed her down and met her needs and went back several times to check on her as well.

## 2021-10-24 NOTE — PROGRESS NOTES
Patient in 511 given narcan due to opioid sedation post op, patient responded immediately, hospitalist notified and house supervisor asif ferrari notified, [place] AUGUSTINE on call for dr Ryan Steele notified.   Will cont to monitor Electronically signed by Juan Martin RN on 10/23/2021 at 8:22 PM

## 2021-10-25 ENCOUNTER — TELEPHONE (OUTPATIENT)
Dept: CARDIOLOGY CLINIC | Age: 61
End: 2021-10-25

## 2021-10-25 LAB
ANION GAP SERPL CALCULATED.3IONS-SCNC: 9 MMOL/L (ref 7–19)
BASOPHILS ABSOLUTE: 0.1 K/UL (ref 0–0.2)
BASOPHILS RELATIVE PERCENT: 0.4 % (ref 0–1)
BUN BLDV-MCNC: 12 MG/DL (ref 8–23)
CALCIUM SERPL-MCNC: 7.5 MG/DL (ref 8.8–10.2)
CHLORIDE BLD-SCNC: 107 MMOL/L (ref 98–111)
CO2: 22 MMOL/L (ref 22–29)
CREAT SERPL-MCNC: 0.8 MG/DL (ref 0.5–0.9)
EKG P AXIS: NORMAL DEGREES
EKG P AXIS: NORMAL DEGREES
EKG P-R INTERVAL: NORMAL MS
EKG P-R INTERVAL: NORMAL MS
EKG Q-T INTERVAL: 304 MS
EKG Q-T INTERVAL: 424 MS
EKG QRS DURATION: 76 MS
EKG QRS DURATION: 78 MS
EKG QTC CALCULATION (BAZETT): 459 MS
EKG QTC CALCULATION (BAZETT): 503 MS
EKG T AXIS: 22 DEGREES
EKG T AXIS: 52 DEGREES
EOSINOPHILS ABSOLUTE: 0 K/UL (ref 0–0.6)
EOSINOPHILS RELATIVE PERCENT: 0.3 % (ref 0–5)
GFR AFRICAN AMERICAN: >59
GFR NON-AFRICAN AMERICAN: >60
GLUCOSE BLD-MCNC: 101 MG/DL (ref 74–109)
HCT VFR BLD CALC: 25.2 % (ref 37–47)
HEMOGLOBIN: 8.2 G/DL (ref 12–16)
IMMATURE GRANULOCYTES #: 0.1 K/UL
LV EF: 65 %
LVEF MODALITY: NORMAL
LYMPHOCYTES ABSOLUTE: 2 K/UL (ref 1.1–4.5)
LYMPHOCYTES RELATIVE PERCENT: 15.9 % (ref 20–40)
MCH RBC QN AUTO: 30.1 PG (ref 27–31)
MCHC RBC AUTO-ENTMCNC: 32.5 G/DL (ref 33–37)
MCV RBC AUTO: 92.6 FL (ref 81–99)
MONOCYTES ABSOLUTE: 1.2 K/UL (ref 0–0.9)
MONOCYTES RELATIVE PERCENT: 9.6 % (ref 0–10)
NEUTROPHILS ABSOLUTE: 9.4 K/UL (ref 1.5–7.5)
NEUTROPHILS RELATIVE PERCENT: 73.3 % (ref 50–65)
PDW BLD-RTO: 14.8 % (ref 11.5–14.5)
PLATELET # BLD: 170 K/UL (ref 130–400)
PMV BLD AUTO: 10.9 FL (ref 9.4–12.3)
POTASSIUM SERPL-SCNC: 3.7 MMOL/L (ref 3.5–5)
RBC # BLD: 2.72 M/UL (ref 4.2–5.4)
SODIUM BLD-SCNC: 138 MMOL/L (ref 136–145)
WBC # BLD: 12.8 K/UL (ref 4.8–10.8)

## 2021-10-25 PROCEDURE — 93005 ELECTROCARDIOGRAM TRACING: CPT | Performed by: STUDENT IN AN ORGANIZED HEALTH CARE EDUCATION/TRAINING PROGRAM

## 2021-10-25 PROCEDURE — 2140000000 HC CCU INTERMEDIATE R&B

## 2021-10-25 PROCEDURE — 99254 IP/OBS CNSLTJ NEW/EST MOD 60: CPT | Performed by: INTERNAL MEDICINE

## 2021-10-25 PROCEDURE — 2500000003 HC RX 250 WO HCPCS: Performed by: ORTHOPAEDIC SURGERY

## 2021-10-25 PROCEDURE — 6370000000 HC RX 637 (ALT 250 FOR IP): Performed by: ORTHOPAEDIC SURGERY

## 2021-10-25 PROCEDURE — 93010 ELECTROCARDIOGRAM REPORT: CPT | Performed by: INTERNAL MEDICINE

## 2021-10-25 PROCEDURE — 85025 COMPLETE CBC W/AUTO DIFF WBC: CPT

## 2021-10-25 PROCEDURE — 6370000000 HC RX 637 (ALT 250 FOR IP): Performed by: HOSPITALIST

## 2021-10-25 PROCEDURE — 6360000002 HC RX W HCPCS: Performed by: ORTHOPAEDIC SURGERY

## 2021-10-25 PROCEDURE — 36415 COLL VENOUS BLD VENIPUNCTURE: CPT

## 2021-10-25 PROCEDURE — 80048 BASIC METABOLIC PNL TOTAL CA: CPT

## 2021-10-25 PROCEDURE — 2580000003 HC RX 258: Performed by: ORTHOPAEDIC SURGERY

## 2021-10-25 PROCEDURE — 93306 TTE W/DOPPLER COMPLETE: CPT

## 2021-10-25 PROCEDURE — 6370000000 HC RX 637 (ALT 250 FOR IP): Performed by: STUDENT IN AN ORGANIZED HEALTH CARE EDUCATION/TRAINING PROGRAM

## 2021-10-25 RX ORDER — DOCUSATE SODIUM 100 MG/1
CAPSULE, LIQUID FILLED ORAL
COMMUNITY

## 2021-10-25 RX ORDER — NAPROXEN SODIUM 220 MG
TABLET ORAL
Status: ON HOLD | COMMUNITY
End: 2021-11-06 | Stop reason: HOSPADM

## 2021-10-25 RX ORDER — FLUTICASONE FUROATE AND VILANTEROL TRIFENATATE 100; 25 UG/1; UG/1
POWDER RESPIRATORY (INHALATION)
COMMUNITY

## 2021-10-25 RX ORDER — ASPIRIN 81 MG/1
TABLET, CHEWABLE ORAL
COMMUNITY

## 2021-10-25 RX ORDER — ONDANSETRON 4 MG/1
TABLET, ORALLY DISINTEGRATING ORAL
COMMUNITY
Start: 2021-09-13

## 2021-10-25 RX ADMIN — ATORVASTATIN CALCIUM 20 MG: 20 TABLET, FILM COATED ORAL at 08:17

## 2021-10-25 RX ADMIN — OXYCODONE 10 MG: 5 TABLET ORAL at 19:16

## 2021-10-25 RX ADMIN — FAMOTIDINE 20 MG: 10 INJECTION, SOLUTION INTRAVENOUS at 21:13

## 2021-10-25 RX ADMIN — APIXABAN 5 MG: 5 TABLET, FILM COATED ORAL at 14:47

## 2021-10-25 RX ADMIN — OXYCODONE 10 MG: 5 TABLET ORAL at 08:17

## 2021-10-25 RX ADMIN — SODIUM CHLORIDE, PRESERVATIVE FREE 10 ML: 5 INJECTION INTRAVENOUS at 21:13

## 2021-10-25 RX ADMIN — METOPROLOL TARTRATE 25 MG: 25 TABLET, FILM COATED ORAL at 14:47

## 2021-10-25 RX ADMIN — APIXABAN 5 MG: 5 TABLET, FILM COATED ORAL at 21:13

## 2021-10-25 RX ADMIN — OXYCODONE 10 MG: 5 TABLET ORAL at 14:46

## 2021-10-25 RX ADMIN — METOPROLOL TARTRATE 25 MG: 25 TABLET, FILM COATED ORAL at 21:13

## 2021-10-25 RX ADMIN — CYCLOBENZAPRINE 10 MG: 10 TABLET, FILM COATED ORAL at 01:19

## 2021-10-25 RX ADMIN — OXYCODONE 10 MG: 5 TABLET ORAL at 01:19

## 2021-10-25 RX ADMIN — ENOXAPARIN SODIUM 40 MG: 40 INJECTION SUBCUTANEOUS at 08:18

## 2021-10-25 RX ADMIN — FAMOTIDINE 20 MG: 10 INJECTION, SOLUTION INTRAVENOUS at 08:18

## 2021-10-25 RX ADMIN — LISINOPRIL 2.5 MG: 2.5 TABLET ORAL at 08:18

## 2021-10-25 ASSESSMENT — PAIN SCALES - GENERAL
PAINLEVEL_OUTOF10: 8
PAINLEVEL_OUTOF10: 8
PAINLEVEL_OUTOF10: 7
PAINLEVEL_OUTOF10: 10

## 2021-10-25 ASSESSMENT — ENCOUNTER SYMPTOMS
GASTROINTESTINAL NEGATIVE: 1
RESPIRATORY NEGATIVE: 1

## 2021-10-25 NOTE — PROGRESS NOTES
Physical Therapy  Name: Joy Lucero  MRN:  [id number]  Date of service:  10/25/2021     10/25/21 1151   Subjective   Subjective Attempt: No tx at this time per RN d/t episode of A-Fib and pending cardiac testing. Will cont to follow.          Electronically signed by Mitchell Gonzalez PTA on 10/25/2021 at 11:52 AM
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
## 2021-10-25 NOTE — PROGRESS NOTES
Orthopedic Surgery Progress Note    Edgardo Felty  10/25/2021      Subjective:     Systemic or Specific Complaints: EVENTS NOTED, NO ORTHO ISSUES AT THIS TIME. DO NOT FEEL SHE IS SAFE TO RETURN HOME. SHE WILL NEED SNF FOR SEVERAL WEEKS. Objective:     Patient Vitals for the past 24 hrs:   BP Temp Temp src Pulse Resp SpO2   10/25/21 1349 131/82 99 °F (37.2 °C) Temporal 98 20 94 %   10/25/21 1008 138/88 98.7 °F (37.1 °C) Temporal 95 16 100 %   10/25/21 0539 (!) 154/68 97 °F (36.1 °C) Temporal 79 18 96 %   10/25/21 0203 (!) 162/80 97.3 °F (36.3 °C) Temporal 85 16 94 %   10/24/21 2159 (!) 150/85 98.8 °F (37.1 °C) Temporal 88 16 94 %   10/24/21 1754 (!) 175/84 98.1 °F (36.7 °C) Temporal 85 16 94 %       right upper/RIGHT LOWER  General: alert, appears stated age and cooperative   Wound: clean, dry, intact             Dressing: clean, dry, and intact   Extremity: Distal NVI           DVT Exam: No evidence of DVT seen on physical exam.                   Data Review:  Recent Labs     10/24/21  1648 10/25/21  0315   HGB 8.6* 8.2*     Recent Labs     10/25/21  0315      K 3.7   CREATININE 0.8       Assessment:     POD# 2   1.  Open reduction and internal fixation of right intra-articular distal  femur fracture. 2.  Open reduction and internal fixation of left three-part  extra-articular distal radius fracture    Plan:      1:  DVT prophylaxis, ICE, elevate  2:  Pain control  3:  Physical therapy/Occupational therapy  4:  Anticipate discharge WHEN PLACEMENT IS OBTAINED.  5: Non-weight bearing BOTH RIGHT UPPER AND LOWER EXTREMITIES. 6: F/U IN OFFICE 2 WEEKS FOR WOUND CHECK AND XRAYS. CALL IF NEEDED. THANKS.        Electronically signed by Barbara Rios PA-C on 10/25/2021 at 3:08 PM

## 2021-10-25 NOTE — PLAN OF CARE
Problem: Nutrition  Goal: Optimal nutrition therapy  Outcome: Ongoing  Note: Nutrition Problem #1: Inadequate protein-energy intake  Intervention: Food and/or Nutrient Delivery: Continue Current Diet, Start Oral Nutrition Supplement  Nutritional Goals: PO 50% or more, wt stable or gain
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
## 2021-10-25 NOTE — PROGRESS NOTES
Merly Elkins received from  to room # 713 . Mental Status: Patient is oriented, alert, coherent, logical, thought processes intact and able to concentrate and follow conversation. Vitals:    10/25/21 1349   BP: 131/82   Pulse: 98   Resp: 20   Temp: 99 °F (37.2 °C)   SpO2: 94%     Placed on cardiac monitor: Yes. Box # [id number]. Belongings: None with patient at bedside . Family at bedside No.  Oriented Patient to room. Call light within reach. Yes. Transfer was: Well tolerated by patient. .    Electronically signed by Anjel Bailey RN on 10/25/2021 at 4:34 PM

## 2021-10-25 NOTE — PROGRESS NOTES
Occupational Therapy                                                                        RE: Renette Lennox                                   MRN: [id number]      OT attempted therapy evaluation this afternoon.  Nsg requested OT to follow-up at a later time due to pt's elevated HR at rest.     - Eugenia Banerjee OTR/L  Electronically signed by Eugenia Banerjee OTR/L on 10/25/2021 at 11:26 AM.

## 2021-10-26 LAB
ANION GAP SERPL CALCULATED.3IONS-SCNC: 8 MMOL/L (ref 7–19)
BASOPHILS ABSOLUTE: 0.1 K/UL (ref 0–0.2)
BASOPHILS RELATIVE PERCENT: 0.5 % (ref 0–1)
BUN BLDV-MCNC: 12 MG/DL (ref 8–23)
CALCIUM SERPL-MCNC: 7.5 MG/DL (ref 8.8–10.2)
CHLORIDE BLD-SCNC: 104 MMOL/L (ref 98–111)
CO2: 25 MMOL/L (ref 22–29)
CREAT SERPL-MCNC: 0.7 MG/DL (ref 0.5–0.9)
EOSINOPHILS ABSOLUTE: 0.2 K/UL (ref 0–0.6)
EOSINOPHILS RELATIVE PERCENT: 1.6 % (ref 0–5)
GFR AFRICAN AMERICAN: >59
GFR NON-AFRICAN AMERICAN: >60
GLUCOSE BLD-MCNC: 102 MG/DL (ref 74–109)
HCT VFR BLD CALC: 25.6 % (ref 37–47)
HEMOGLOBIN: 8.5 G/DL (ref 12–16)
IMMATURE GRANULOCYTES #: 0.1 K/UL
LYMPHOCYTES ABSOLUTE: 2.4 K/UL (ref 1.1–4.5)
LYMPHOCYTES RELATIVE PERCENT: 19.5 % (ref 20–40)
MCH RBC QN AUTO: 30.2 PG (ref 27–31)
MCHC RBC AUTO-ENTMCNC: 33.2 G/DL (ref 33–37)
MCV RBC AUTO: 91.1 FL (ref 81–99)
MONOCYTES ABSOLUTE: 1.1 K/UL (ref 0–0.9)
MONOCYTES RELATIVE PERCENT: 8.7 % (ref 0–10)
NEUTROPHILS ABSOLUTE: 8.4 K/UL (ref 1.5–7.5)
NEUTROPHILS RELATIVE PERCENT: 69.3 % (ref 50–65)
PDW BLD-RTO: 14.3 % (ref 11.5–14.5)
PLATELET # BLD: 176 K/UL (ref 130–400)
PMV BLD AUTO: 10.6 FL (ref 9.4–12.3)
POTASSIUM SERPL-SCNC: 3.7 MMOL/L (ref 3.5–5)
RBC # BLD: 2.81 M/UL (ref 4.2–5.4)
SODIUM BLD-SCNC: 137 MMOL/L (ref 136–145)
WBC # BLD: 12.2 K/UL (ref 4.8–10.8)

## 2021-10-26 PROCEDURE — 2580000003 HC RX 258: Performed by: ORTHOPAEDIC SURGERY

## 2021-10-26 PROCEDURE — 85025 COMPLETE CBC W/AUTO DIFF WBC: CPT

## 2021-10-26 PROCEDURE — 2500000003 HC RX 250 WO HCPCS: Performed by: ORTHOPAEDIC SURGERY

## 2021-10-26 PROCEDURE — 6370000000 HC RX 637 (ALT 250 FOR IP): Performed by: STUDENT IN AN ORGANIZED HEALTH CARE EDUCATION/TRAINING PROGRAM

## 2021-10-26 PROCEDURE — 2140000000 HC CCU INTERMEDIATE R&B

## 2021-10-26 PROCEDURE — 97530 THERAPEUTIC ACTIVITIES: CPT

## 2021-10-26 PROCEDURE — 6370000000 HC RX 637 (ALT 250 FOR IP): Performed by: ORTHOPAEDIC SURGERY

## 2021-10-26 PROCEDURE — 6370000000 HC RX 637 (ALT 250 FOR IP): Performed by: HOSPITALIST

## 2021-10-26 PROCEDURE — 99233 SBSQ HOSP IP/OBS HIGH 50: CPT | Performed by: INTERNAL MEDICINE

## 2021-10-26 PROCEDURE — 36415 COLL VENOUS BLD VENIPUNCTURE: CPT

## 2021-10-26 PROCEDURE — 80048 BASIC METABOLIC PNL TOTAL CA: CPT

## 2021-10-26 RX ADMIN — OXYCODONE 10 MG: 5 TABLET ORAL at 20:33

## 2021-10-26 RX ADMIN — FAMOTIDINE 20 MG: 10 INJECTION, SOLUTION INTRAVENOUS at 09:19

## 2021-10-26 RX ADMIN — OXYCODONE 5 MG: 5 TABLET ORAL at 06:23

## 2021-10-26 RX ADMIN — METOPROLOL TARTRATE 25 MG: 25 TABLET, FILM COATED ORAL at 09:18

## 2021-10-26 RX ADMIN — APIXABAN 5 MG: 5 TABLET, FILM COATED ORAL at 20:34

## 2021-10-26 RX ADMIN — OXYCODONE 10 MG: 5 TABLET ORAL at 09:19

## 2021-10-26 RX ADMIN — SODIUM CHLORIDE, PRESERVATIVE FREE 10 ML: 5 INJECTION INTRAVENOUS at 20:35

## 2021-10-26 RX ADMIN — LISINOPRIL 2.5 MG: 2.5 TABLET ORAL at 09:18

## 2021-10-26 RX ADMIN — SODIUM CHLORIDE, PRESERVATIVE FREE 10 ML: 5 INJECTION INTRAVENOUS at 09:19

## 2021-10-26 RX ADMIN — OXYCODONE 10 MG: 5 TABLET ORAL at 00:01

## 2021-10-26 RX ADMIN — APIXABAN 5 MG: 5 TABLET, FILM COATED ORAL at 09:18

## 2021-10-26 RX ADMIN — ATORVASTATIN CALCIUM 20 MG: 20 TABLET, FILM COATED ORAL at 09:19

## 2021-10-26 RX ADMIN — FAMOTIDINE 20 MG: 10 INJECTION, SOLUTION INTRAVENOUS at 20:34

## 2021-10-26 RX ADMIN — METOPROLOL TARTRATE 25 MG: 25 TABLET, FILM COATED ORAL at 20:34

## 2021-10-26 RX ADMIN — OXYCODONE 10 MG: 5 TABLET ORAL at 16:06

## 2021-10-26 ASSESSMENT — PAIN DESCRIPTION - ORIENTATION: ORIENTATION: RIGHT

## 2021-10-26 ASSESSMENT — PAIN SCALES - GENERAL
PAINLEVEL_OUTOF10: 0
PAINLEVEL_OUTOF10: 8
PAINLEVEL_OUTOF10: 0
PAINLEVEL_OUTOF10: 10
PAINLEVEL_OUTOF10: 6
PAINLEVEL_OUTOF10: 9
PAINLEVEL_OUTOF10: 6
PAINLEVEL_OUTOF10: 6

## 2021-10-26 ASSESSMENT — PAIN - FUNCTIONAL ASSESSMENT: PAIN_FUNCTIONAL_ASSESSMENT: PREVENTS OR INTERFERES SOME ACTIVE ACTIVITIES AND ADLS

## 2021-10-26 ASSESSMENT — PAIN DESCRIPTION - LOCATION
LOCATION: ARM;BACK;LEG
LOCATION: ARM;LEG;BACK

## 2021-10-26 ASSESSMENT — PAIN DESCRIPTION - PAIN TYPE
TYPE: SURGICAL PAIN
TYPE: SURGICAL PAIN

## 2021-10-26 NOTE — PROGRESS NOTES
10/26/21 1126   Restrictions/Precautions   Restrictions/Precautions Fall Risk;Weight Bearing   Required Braces or Orthoses? Yes   Lower Extremity Weight Bearing Restrictions   Right Lower Extremity Weight Bearing Non Weight Bearing   Upper Extremity Weight Bearing Restrictions   Right Upper Extremity Weight Bearing Non Weight Bearing   Required Braces or Orthoses   Right Lower Extremity Brace Knee Immobilizer   Right Upper Extremity Brace/Splint Sling   General   Chart Reviewed Yes   Subjective   Subjective Patient C/O pain thru out [address] not pleasant to work with . At one point  told this therapist to be quite. At end of [address] when asked if she needed anything else   \"  Yes my  Barreta  9 mm   \"    Patient  then took left left hand  pointed as if she had gun  and said  \"  Pow \"   General Comment   Comments Wayne Mcduffie RN and Wilmer Cantu RN notified of patients SI . Pain Screening   Patient Currently in Pain Yes   Intervention List Patient able to continue with treatment;Nurse called to administer meds   Pain Assessment   Pain Assessment 0-10   Pain Level 6   Pain Type Surgical pain   Pain Location Arm;Leg;Back   Pain Orientation Right   Functional Pain Assessment Prevents or interferes some active activities and ADLs   Bed Mobility   Rolling Maximal assistance   Supine to Sit Maximal assistance   Scooting Moderate assistance   Comment Patient sat EOB for about 5 minutes  then had to lie back down due to pain   Ambulation   Ambulation? No   WB Status NWB RUE through hand, NWB RLE   Activity Tolerance   Activity Tolerance Patient limited by pain   Safety Devices   Type of devices Left in bed;Call light within reach; Bed alarm in place   Physical Therapy    Electronically signed by Angelika Manrique PTA on 10/26/2021 at 11:44 AM
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
## 2021-10-26 NOTE — FLOWSHEET NOTE
10/25/21 0706   Encounter Summary   Services provided to: Patient   Referral/Consult From: Nurse;Patient   Support System Other (Comment)  (friend from another state plans to come help her)   Complexity of Encounter High   Length of Encounter 1 hour;45 minutes   Spiritual/Orthodox   Type Spiritual support   Assessment Angry; Tearful;Grieving; Anxious; Helplessness; Resentful   Intervention Active listening;Explored feelings, thoughts, concerns;Explored coping resources;Prayer   Outcome Comfort; Connection/belonging;Expressed gratitude;Engaged in conversation;Expressed feelings/needs/concerns; Tearful;Less anxious, less agitated;Grieving;Encouraged;Receptive;Venting emotion

## 2021-10-26 NOTE — CARE COORDINATION
Spoke with patient at bedside and gave choice list for skilled nursing facilities. She has chosen [facility] and referral made.   Will await bed [address]: [phone] S:[phone]  Electronically signed by Deyvi Roy RN on 10/26/2021 at 3:30 PM

## 2021-10-26 NOTE — PROGRESS NOTES
VIEWS)    Result Date: 10/23/2021  INTRAOPERATIVE FLUOROSCOPIC GUIDANCE 10/23/2021 INDICATION: Intraoperative fluoroscopic guidance. Right radius fracture fixation. TECHNIQUE: 4 fluoroscopic images from the operating room were submitted for evaluation. Please note, no radiologist was in attendance for acquisition of these images. These images are available for future reference to the attending surgeon. Radiation: 19.9 seconds. 0.5182 mGym2                     FINDINGS: Intraoperative images related to right radius fracture fixation. 1. Intraoperative fluoroscopic guidance as described. 2. Please refer to real-time fluoroscopy and operative report for full details. Signed by Dr Rupal Sorenson (MIN 2 VIEWS)    Result Date: 10/23/2021  INTRAOPERATIVE FLUOROSCOPIC GUIDANCE 10/23/2021 INDICATION: Intraoperative fluoroscopic guidance. Right femur fixation TECHNIQUE: 5 fluoroscopic images from the operating room were submitted for evaluation. Please note, no radiologist was in attendance for acquisition of these images. These images are available for future reference to the attending surgeon. Radiation: 1 minute 23 seconds. 0.0752 mGym2                     FINDINGS: Intraoperative images related to right femur fracture fixation. 1. Intraoperative fluoroscopic guidance as described. 2. Please refer to real-time fluoroscopy and operative report for full details. Signed by Dr Peri Ro    Result Date: 10/23/2021  Radiology exam is complete. No Radiologist dictation. Please follow up with ordering provider.      ECHO 2D WO COLOR DOPPLER COMPLETE    Result Date: 10/25/2021  Transthoracic Echocardiography Report (TTE)  Demographics   Patient Name   Adonis Castillo   Date of Study           10/25/2021   MRN            [id number]        Gender                  Female   Date of Birth  1960    Room Number             [facility]-0713   Age            56 year(s)   Height: 66 inches     Referring Physician     Sherry Mijares   Weight:        119 pounds    Sonographer             Willem Edmondson YONAS   BSA:           1.6 m^2       Interpreting Physician  Hetal Rodriguez MD   BMI:           19.21 kg/m^2  Procedure Type of Study   TTE procedure:ECHOCARDIOGRAM COMPLETE 2D WO COLOR DOPPLER. Study Location: Echo Lab Technical Quality: Adequate visualization Patient Status: Inpatient Indications:Atrial fibrillation. Conclusions   Summary  LV is normal in size with normal systolic function. LV ejection fraction  estimated at 65%. Diastolic function appears preserved. RV appears normal in size with normal systolic function. Borderline mild left atrial enlargement. Borderline mild right atrial enlargement. Aortic valve appears trileaflet with mild leaflet thickening. No  significant stenosis or regurgitation. Mitral valve appears mildly thickened with normal leaflet mobility. Trace  to mild mitral regurgitation. No stenosis. Mild to moderate tricuspid regurgitation. RVSP estimated at 43 mmHg. Pleural effusion noted. Signature   ----------------------------------------------------------------  Electronically signed by Hetal Rodriguez MD(Interpreting physician)  on 10/25/2021 05:01 PM  ----------------------------------------------------------------  M-Mode Measurements (cm)   LVIDd: 4.34 cm                         LVIDs: 2.46 cm  IVSd: 0.91 cm  LVPWd: 0.84 cm                         AO Root Dimension: 1.7 cm  Rt. Vent.  Dimension: 2.67 cm           LA: 3.7 cm  % Ejection Fraction: 65 %              LVOT: 2 cm  Doppler Measurements:   AV Peak Velocity:165 cm/s           MV Peak E-Wave: 76 cm/s  AV Peak Gradient: 10.89 mmHg        MV Peak A-Wave: 53.6 cm/s                                      MV E/A Ratio: 1.42 %  TR Velocity:276 cm/s                MV Peak Gradient: 2.31 mmHg  TR Gradient:30.47 mmHg  Estimated RAP:3 mmHg  RVSP:43 mmHg          Assessment and Plan:  1) Atrial fibrillation- Remains in normal sinus rhythm. Continue Eliquis and Lopressor. She would like to continue to see Dr. Lorie Swan. She is to follow-up with him for outpatient stress test. OK to discharge from cardiology standpoint when bed is available. 2) HTN- Blood pressure stable on current medications. GOLDY Anderson    --------------      Physician's attestation/substantial contribution:  I, Dr. Martín Mazariegos, independently performed an evaluation on Mrs Tyler Bueno. I have reviewed relevant medical information/data to include but not limited to medication list, relevant appropriate labs and imaging when applicable. I reviewed other physician's notes, ancillary services and nurses assessment. I have reviewed the above documentation completed by the Nurse Practitioner . Please see my additional contributions to the history of present illness, physical examination, and assessment/medical decision-making that reflect my findings and impressions. I discussed essential elements of the care plan with the NP and the patient as well. I answered all the questions to the patient's satisfaction. I concur with above stated.        GOLDY Anderson MD 10/26/2021 3:32 PM      Isak Valencia MD, [place], [place]  Interventional Cardiologist, Endovascular Specialist   Medical Director, Structural Heart Program   [facility]        (Please note that portions of this note were completed with a voice recognition program.  Effortswere made to edit the dictations but occasionally words are mis-transcribed.)

## 2021-10-26 NOTE — PROGRESS NOTES
During shift change, this nurse introduced herself to pt and asked if she needed anything at the moment. The pt was upset, and said she was uncomfortable and not happy with dinner which she did not touch. Offered a meal pt refused, attempted to reposition pt for comfort and she refused and told nurses to go away while nurses were attempting to reposition her in bed. While giving pain meds as pt requested, pt threw them on bed and nurses tried to help her get the pills and help her take them, pt got upset and told nurses this was \"the worst patient care. \" Also pt complained of being afraid of getting the bed wet, nurse explained pt has a purewick in place that is working properly and in place, however other methods were offered to pt like bed pan, or brief over purewick, paper pad for more security. Pt still angry, speaking in a high tone of voice and complaining of care provided, pt states If she feels wet or notices pee coming out of the purewick she will call 911. Offered multiple options to get her more comfortable and explained several times about purewick, how it works, that is working properly and if she had an accident we can clean her up. Offered meals and pt continues to refuse. Pt demmanded to see the  and advocate.  contacted to see her tonight. Pt advocate will be here in the morning. Will continue to monitor.        Electronically signed by Nick Chacon RN on 10/25/2021 at 11:43 PM

## 2021-10-26 NOTE — PROGRESS NOTES
Notified MD of Physical Therapist working with pt and having a real difficult time with her. Physical Therapist had noted that when he was about to leave room he had asked pt if he could get her anything and she stated to him, Yes her baretta 9mm and then she proceeded to take her hand to her head and pointed as if she had the gun and said POW! Notified MD that after therapist left that pt had stated she didn't want him back in her room and felt he was rude and did not listen to anything she said. Notified MD that since gesture could potentially represent suicidal ideations, I thought I should make him aware. Awaiting response and will continue to monitor.

## 2021-10-27 LAB
ANION GAP SERPL CALCULATED.3IONS-SCNC: 9 MMOL/L (ref 7–19)
BASOPHILS ABSOLUTE: 0 K/UL (ref 0–0.2)
BASOPHILS RELATIVE PERCENT: 0.3 % (ref 0–1)
BUN BLDV-MCNC: 12 MG/DL (ref 8–23)
CALCIUM SERPL-MCNC: 7.8 MG/DL (ref 8.8–10.2)
CHLORIDE BLD-SCNC: 100 MMOL/L (ref 98–111)
CO2: 26 MMOL/L (ref 22–29)
CREAT SERPL-MCNC: 0.7 MG/DL (ref 0.5–0.9)
EOSINOPHILS ABSOLUTE: 0.2 K/UL (ref 0–0.6)
EOSINOPHILS RELATIVE PERCENT: 1.6 % (ref 0–5)
FOLATE: 5.3 NG/ML (ref 4.8–37.3)
GFR AFRICAN AMERICAN: >59
GFR NON-AFRICAN AMERICAN: >60
GLUCOSE BLD-MCNC: 100 MG/DL (ref 74–109)
HCT VFR BLD CALC: 26.4 % (ref 37–47)
HEMOGLOBIN: 8.5 G/DL (ref 12–16)
IMMATURE GRANULOCYTES #: 0.1 K/UL
IRON SATURATION: 9 % (ref 14–50)
IRON: 15 UG/DL (ref 37–145)
LYMPHOCYTES ABSOLUTE: 1.9 K/UL (ref 1.1–4.5)
LYMPHOCYTES RELATIVE PERCENT: 15 % (ref 20–40)
MCH RBC QN AUTO: 30 PG (ref 27–31)
MCHC RBC AUTO-ENTMCNC: 32.2 G/DL (ref 33–37)
MCV RBC AUTO: 93.3 FL (ref 81–99)
MONOCYTES ABSOLUTE: 1.1 K/UL (ref 0–0.9)
MONOCYTES RELATIVE PERCENT: 8.8 % (ref 0–10)
NEUTROPHILS ABSOLUTE: 9.5 K/UL (ref 1.5–7.5)
NEUTROPHILS RELATIVE PERCENT: 73.8 % (ref 50–65)
PDW BLD-RTO: 13.8 % (ref 11.5–14.5)
PLATELET # BLD: 231 K/UL (ref 130–400)
PMV BLD AUTO: 10.6 FL (ref 9.4–12.3)
POTASSIUM SERPL-SCNC: 3.8 MMOL/L (ref 3.5–5)
RBC # BLD: 2.83 M/UL (ref 4.2–5.4)
SODIUM BLD-SCNC: 135 MMOL/L (ref 136–145)
TOTAL IRON BINDING CAPACITY: 174 UG/DL (ref 250–400)
VITAMIN B-12: 287 PG/ML (ref 211–946)
WBC # BLD: 12.8 K/UL (ref 4.8–10.8)

## 2021-10-27 PROCEDURE — 80048 BASIC METABOLIC PNL TOTAL CA: CPT

## 2021-10-27 PROCEDURE — 2140000000 HC CCU INTERMEDIATE R&B

## 2021-10-27 PROCEDURE — 83540 ASSAY OF IRON: CPT

## 2021-10-27 PROCEDURE — 82746 ASSAY OF FOLIC ACID SERUM: CPT

## 2021-10-27 PROCEDURE — 6370000000 HC RX 637 (ALT 250 FOR IP): Performed by: ORTHOPAEDIC SURGERY

## 2021-10-27 PROCEDURE — 97530 THERAPEUTIC ACTIVITIES: CPT

## 2021-10-27 PROCEDURE — 6370000000 HC RX 637 (ALT 250 FOR IP): Performed by: STUDENT IN AN ORGANIZED HEALTH CARE EDUCATION/TRAINING PROGRAM

## 2021-10-27 PROCEDURE — 2580000003 HC RX 258: Performed by: ORTHOPAEDIC SURGERY

## 2021-10-27 PROCEDURE — 2500000003 HC RX 250 WO HCPCS: Performed by: ORTHOPAEDIC SURGERY

## 2021-10-27 PROCEDURE — 82607 VITAMIN B-12: CPT

## 2021-10-27 PROCEDURE — 6370000000 HC RX 637 (ALT 250 FOR IP): Performed by: HOSPITALIST

## 2021-10-27 PROCEDURE — 83550 IRON BINDING TEST: CPT

## 2021-10-27 PROCEDURE — 97535 SELF CARE MNGMENT TRAINING: CPT

## 2021-10-27 PROCEDURE — 36415 COLL VENOUS BLD VENIPUNCTURE: CPT

## 2021-10-27 PROCEDURE — 85025 COMPLETE CBC W/AUTO DIFF WBC: CPT

## 2021-10-27 PROCEDURE — 97166 OT EVAL MOD COMPLEX 45 MIN: CPT

## 2021-10-27 RX ADMIN — OXYCODONE 10 MG: 5 TABLET ORAL at 01:25

## 2021-10-27 RX ADMIN — LISINOPRIL 2.5 MG: 2.5 TABLET ORAL at 08:18

## 2021-10-27 RX ADMIN — OXYCODONE 10 MG: 5 TABLET ORAL at 19:51

## 2021-10-27 RX ADMIN — OXYCODONE 10 MG: 5 TABLET ORAL at 14:11

## 2021-10-27 RX ADMIN — FAMOTIDINE 20 MG: 10 INJECTION, SOLUTION INTRAVENOUS at 08:31

## 2021-10-27 RX ADMIN — ATORVASTATIN CALCIUM 20 MG: 20 TABLET, FILM COATED ORAL at 08:18

## 2021-10-27 RX ADMIN — SODIUM CHLORIDE, PRESERVATIVE FREE 10 ML: 5 INJECTION INTRAVENOUS at 19:52

## 2021-10-27 RX ADMIN — APIXABAN 5 MG: 5 TABLET, FILM COATED ORAL at 19:51

## 2021-10-27 RX ADMIN — SODIUM CHLORIDE, PRESERVATIVE FREE 10 ML: 5 INJECTION INTRAVENOUS at 08:16

## 2021-10-27 RX ADMIN — METOPROLOL TARTRATE 25 MG: 25 TABLET, FILM COATED ORAL at 19:51

## 2021-10-27 RX ADMIN — METOPROLOL TARTRATE 25 MG: 25 TABLET, FILM COATED ORAL at 08:19

## 2021-10-27 RX ADMIN — APIXABAN 5 MG: 5 TABLET, FILM COATED ORAL at 08:19

## 2021-10-27 RX ADMIN — FAMOTIDINE 20 MG: 10 INJECTION, SOLUTION INTRAVENOUS at 21:00

## 2021-10-27 ASSESSMENT — PAIN SCALES - GENERAL
PAINLEVEL_OUTOF10: 6
PAINLEVEL_OUTOF10: 0
PAINLEVEL_OUTOF10: 0
PAINLEVEL_OUTOF10: 10
PAINLEVEL_OUTOF10: 10
PAINLEVEL_OUTOF10: 6
PAINLEVEL_OUTOF10: 0
PAINLEVEL_OUTOF10: 7

## 2021-10-27 ASSESSMENT — PAIN DESCRIPTION - PAIN TYPE
TYPE: ACUTE PAIN
TYPE: SURGICAL PAIN

## 2021-10-27 ASSESSMENT — PAIN DESCRIPTION - ORIENTATION
ORIENTATION: RIGHT;LEFT

## 2021-10-27 ASSESSMENT — PAIN DESCRIPTION - LOCATION
LOCATION: RIB CAGE
LOCATION: LEG;RIB CAGE
LOCATION: FLANK;RIB CAGE

## 2021-10-27 ASSESSMENT — PAIN DESCRIPTION - DESCRIPTORS
DESCRIPTORS: SORE
DESCRIPTORS: DISCOMFORT;SORE
DESCRIPTORS: ACHING;SORE

## 2021-10-27 ASSESSMENT — PAIN - FUNCTIONAL ASSESSMENT
PAIN_FUNCTIONAL_ASSESSMENT: PREVENTS OR INTERFERES SOME ACTIVE ACTIVITIES AND ADLS
PAIN_FUNCTIONAL_ASSESSMENT: PREVENTS OR INTERFERES SOME ACTIVE ACTIVITIES AND ADLS

## 2021-10-27 ASSESSMENT — PAIN DESCRIPTION - FREQUENCY
FREQUENCY: INTERMITTENT
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
## 2021-10-27 NOTE — FLOWSHEET NOTE
10/27/21 1021   Encounter Summary   Services provided to: Patient   Referral/Consult From: Nurse;Patient   Complexity of Encounter High   Length of Encounter 30 minutes   Spiritual/Pentecostal   Type Spiritual struggle   Assessment Angry; Loneliness  (Pt expressed \"My impression is - being treated like a rug. ..)   Intervention Active listening;Confronted/challenged   Outcome Venting emotion    Per patient's request, talked with Care Management/Mary Hicks. Zulma Miller As well as to KARL Suarez CN.     Electronically signed by Tete Garcia  [place] on 10/27/2021 at 10:24 AM

## 2021-10-27 NOTE — PROGRESS NOTES
Physical Therapy  Name: Cynthia Corley  MRN:  [id number]  Date of service:  10/27/2021     10/27/21 1542   Restrictions/Precautions   Restrictions/Precautions Fall Risk;Weight Bearing   Required Braces or Orthoses? Yes   Lower Extremity Weight Bearing Restrictions   Right Lower Extremity Weight Bearing Non Weight Bearing   Upper Extremity Weight Bearing Restrictions   Right Upper Extremity Weight Bearing Non Weight Bearing   Required Braces or Orthoses   Right Lower Extremity Brace Knee Immobilizer   Right Upper Extremity Brace/Splint Sling   General   Chart Reviewed Yes   Subjective   Subjective Pt quite pleasent and agrees to work with therapy. Pain Screening   Patient Currently in Pain Yes   Pain Assessment   Pain Assessment 0-10   Pain Level 6   Pain Type Acute pain   Pain Location Leg;Rib cage   Pain Orientation Right;Left   Pain Descriptors Aching; Sore   Functional Pain Assessment Prevents or interferes some active activities and ADLs   Non-Pharmaceutical Pain Intervention(s) Ambulation/Increased Activity;Repositioned; Rest   Response to Pain Intervention Patient Satisfied   Pain Frequency Intermittent   Bed Mobility   Bridging Contact guard assistance   Rolling Minimal assistance   Supine to Sit Minimal assistance   Sit to Supine Minimal assistance   Scooting Contact guard assistance;Minimal assistance   Comment Pt sat EOB unsupported by therapist, did require support under RLE   Transfers   Sit to Stand Minimal Assistance; Moderate Assistance   Stand to sit Minimal Assistance   Stand Pivot Transfers Minimal Assistance; Moderate Assistance  (bed<>BSC)   Ambulation   Ambulation? No   Short term goals   Time Frame for Short term goals 2 wks   Short term goal 1 supine to sit indep   Short term goal 2 stand pivot bed to chair transfer indep   Short term goal 3 amb.  10' with [employer] RW SBA   Conditions Requiring Skilled Therapeutic Intervention   Body structures, Functions, Activity limitations Decreased functional mobility ; Decreased ROM; Decreased strength;Decreased cognition;Decreased safe awareness;Decreased balance; Increased pain;Decreased posture   Assessment Tx focused on functional mobility at EOB and to/from the [facility], assisted pt with hygiene and toileting as well. Pt showing much improvement in overall mobility, but would cont to benefit from skilled therapy services to address mobility deficits further. Activity Tolerance   Activity Tolerance Patient limited by endurance; Patient limited by pain   Safety Devices   Type of devices Bed alarm in place;Call light within reach;Gait belt;Left in bed         Electronically signed by Kt Lujan PTA on 10/27/2021 at 3:48 PM
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
## 2021-10-28 PROBLEM — F43.25 ADJUSTMENT DISORDER WITH MIXED DISTURBANCE OF EMOTIONS AND CONDUCT: Status: ACTIVE | Noted: 2021-10-28

## 2021-10-28 LAB
ANION GAP SERPL CALCULATED.3IONS-SCNC: 11 MMOL/L (ref 7–19)
BASOPHILS ABSOLUTE: 0 K/UL (ref 0–0.2)
BASOPHILS RELATIVE PERCENT: 0.3 % (ref 0–1)
BUN BLDV-MCNC: 14 MG/DL (ref 8–23)
CALCIUM SERPL-MCNC: 8.4 MG/DL (ref 8.8–10.2)
CHLORIDE BLD-SCNC: 98 MMOL/L (ref 98–111)
CO2: 27 MMOL/L (ref 22–29)
CREAT SERPL-MCNC: 0.8 MG/DL (ref 0.5–0.9)
EOSINOPHILS ABSOLUTE: 0.3 K/UL (ref 0–0.6)
EOSINOPHILS RELATIVE PERCENT: 2.2 % (ref 0–5)
GFR AFRICAN AMERICAN: >59
GFR NON-AFRICAN AMERICAN: >60
GLUCOSE BLD-MCNC: 96 MG/DL (ref 74–109)
HCT VFR BLD CALC: 30 % (ref 37–47)
HEMOGLOBIN: 9.7 G/DL (ref 12–16)
IMMATURE GRANULOCYTES #: 0.1 K/UL
LYMPHOCYTES ABSOLUTE: 2.2 K/UL (ref 1.1–4.5)
LYMPHOCYTES RELATIVE PERCENT: 17.7 % (ref 20–40)
MCH RBC QN AUTO: 29.9 PG (ref 27–31)
MCHC RBC AUTO-ENTMCNC: 32.3 G/DL (ref 33–37)
MCV RBC AUTO: 92.6 FL (ref 81–99)
MONOCYTES ABSOLUTE: 1.2 K/UL (ref 0–0.9)
MONOCYTES RELATIVE PERCENT: 9.5 % (ref 0–10)
NEUTROPHILS ABSOLUTE: 8.8 K/UL (ref 1.5–7.5)
NEUTROPHILS RELATIVE PERCENT: 69.9 % (ref 50–65)
PDW BLD-RTO: 13.7 % (ref 11.5–14.5)
PLATELET # BLD: 295 K/UL (ref 130–400)
PMV BLD AUTO: 10.2 FL (ref 9.4–12.3)
POTASSIUM SERPL-SCNC: 4 MMOL/L (ref 3.5–5)
RBC # BLD: 3.24 M/UL (ref 4.2–5.4)
SODIUM BLD-SCNC: 136 MMOL/L (ref 136–145)
WBC # BLD: 12.6 K/UL (ref 4.8–10.8)

## 2021-10-28 PROCEDURE — 2580000003 HC RX 258: Performed by: ORTHOPAEDIC SURGERY

## 2021-10-28 PROCEDURE — 85025 COMPLETE CBC W/AUTO DIFF WBC: CPT

## 2021-10-28 PROCEDURE — 2500000003 HC RX 250 WO HCPCS: Performed by: ORTHOPAEDIC SURGERY

## 2021-10-28 PROCEDURE — 36415 COLL VENOUS BLD VENIPUNCTURE: CPT

## 2021-10-28 PROCEDURE — 6370000000 HC RX 637 (ALT 250 FOR IP): Performed by: ORTHOPAEDIC SURGERY

## 2021-10-28 PROCEDURE — 6370000000 HC RX 637 (ALT 250 FOR IP): Performed by: STUDENT IN AN ORGANIZED HEALTH CARE EDUCATION/TRAINING PROGRAM

## 2021-10-28 PROCEDURE — 2060000000 HC ICU INTERMEDIATE R&B

## 2021-10-28 PROCEDURE — 6370000000 HC RX 637 (ALT 250 FOR IP): Performed by: HOSPITALIST

## 2021-10-28 PROCEDURE — 80048 BASIC METABOLIC PNL TOTAL CA: CPT

## 2021-10-28 RX ORDER — FERROUS SULFATE 325(65) MG
325 TABLET ORAL 2 TIMES DAILY WITH MEALS
Status: DISCONTINUED | OUTPATIENT
Start: 2021-10-28 | End: 2021-11-06 | Stop reason: HOSPADM

## 2021-10-28 RX ADMIN — APIXABAN 5 MG: 5 TABLET, FILM COATED ORAL at 09:52

## 2021-10-28 RX ADMIN — METOPROLOL TARTRATE 25 MG: 25 TABLET, FILM COATED ORAL at 19:40

## 2021-10-28 RX ADMIN — OXYCODONE 10 MG: 5 TABLET ORAL at 09:51

## 2021-10-28 RX ADMIN — METOPROLOL TARTRATE 25 MG: 25 TABLET, FILM COATED ORAL at 09:52

## 2021-10-28 RX ADMIN — FAMOTIDINE 20 MG: 10 INJECTION, SOLUTION INTRAVENOUS at 19:40

## 2021-10-28 RX ADMIN — CYCLOBENZAPRINE 10 MG: 10 TABLET, FILM COATED ORAL at 18:01

## 2021-10-28 RX ADMIN — OXYCODONE 10 MG: 5 TABLET ORAL at 23:28

## 2021-10-28 RX ADMIN — FERROUS SULFATE TAB 325 MG (65 MG ELEMENTAL FE) 325 MG: 325 (65 FE) TAB at 09:54

## 2021-10-28 RX ADMIN — OXYCODONE 10 MG: 5 TABLET ORAL at 19:27

## 2021-10-28 RX ADMIN — ATORVASTATIN CALCIUM 20 MG: 20 TABLET, FILM COATED ORAL at 09:53

## 2021-10-28 RX ADMIN — LISINOPRIL 2.5 MG: 2.5 TABLET ORAL at 09:52

## 2021-10-28 RX ADMIN — FERROUS SULFATE TAB 325 MG (65 MG ELEMENTAL FE) 325 MG: 325 (65 FE) TAB at 18:01

## 2021-10-28 RX ADMIN — OXYCODONE 10 MG: 5 TABLET ORAL at 00:31

## 2021-10-28 RX ADMIN — APIXABAN 5 MG: 5 TABLET, FILM COATED ORAL at 19:40

## 2021-10-28 RX ADMIN — SODIUM CHLORIDE, PRESERVATIVE FREE 10 ML: 5 INJECTION INTRAVENOUS at 09:59

## 2021-10-28 RX ADMIN — SODIUM CHLORIDE, PRESERVATIVE FREE 10 ML: 5 INJECTION INTRAVENOUS at 19:40

## 2021-10-28 RX ADMIN — FAMOTIDINE 20 MG: 10 INJECTION, SOLUTION INTRAVENOUS at 09:58

## 2021-10-28 RX ADMIN — OXYCODONE 10 MG: 5 TABLET ORAL at 14:43

## 2021-10-28 ASSESSMENT — PAIN SCALES - WONG BAKER: WONGBAKER_NUMERICALRESPONSE: 4

## 2021-10-28 ASSESSMENT — PAIN SCALES - GENERAL
PAINLEVEL_OUTOF10: 10
PAINLEVEL_OUTOF10: 10
PAINLEVEL_OUTOF10: 5
PAINLEVEL_OUTOF10: 10
PAINLEVEL_OUTOF10: 2
PAINLEVEL_OUTOF10: 3

## 2021-10-28 ASSESSMENT — PAIN DESCRIPTION - ORIENTATION
ORIENTATION: RIGHT
ORIENTATION: RIGHT

## 2021-10-28 ASSESSMENT — PAIN DESCRIPTION - PAIN TYPE
TYPE: SURGICAL PAIN
TYPE: SURGICAL PAIN

## 2021-10-28 ASSESSMENT — PAIN DESCRIPTION - LOCATION
LOCATION: LEG
LOCATION: LEG

## 2021-10-28 NOTE — PROGRESS NOTES
Nutrition Assessment     Type and Reason for Visit: Reassess    Nutrition Assessment:  Pt contiunes with poor po intake of meals (<25% most documented meals). Pt recieving ONS TID. Continue current POC. Malnutrition Assessment:  Malnutrition Status: Severe malnutrition    Nutrition Related Findings: non-pitting RUE edema      Current Nutrition Therapies:    ADULT DIET; Regular  ADULT ORAL NUTRITION SUPPLEMENT; Lunch, Dinner, Breakfast; Standard High Calorie/High Protein Oral Supplement    Anthropometric Measures:  · Height: 5' 6\" (167.6 cm)  · Current Body Wt: 134 lb (60.8 kg)   · BMI: 21.6    Nutrition Interventions:   Food and/or Nutrient Delivery:  Continue Current Diet, Continue Oral Nutrition Supplement   Coordination of Nutrition Care:  Continue to monitor while inpatient    Goals:  PO 50% or more, wt stable or gain       Nutrition Monitoring and Evaluation:   Food/Nutrient Intake Outcomes:  Food and Nutrient Intake, Supplement Intake  Physical Signs/Symptoms Outcomes:  Biochemical Data, Weight, Skin, Nutrition Focused Physical Findings     Discharge Planning:     Too soon to determine     Electronically signed by Yvonne Dey MS, RD, LD on 10/28/21 at 9:19 AM CDT    Contact: [phone]

## 2021-10-28 NOTE — CARE COORDINATION
Nicki with [facility] has offered services for the pt and pt is accepting and facility to initiate precert approval today.      [facility]  Ph: [phone]  Fax: [phone]
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
## 2021-10-28 NOTE — PROGRESS NOTES
Occupational Therapy                                                                        RE: Rosa Mays                                   MRN: [id number]      OT attempted  treatment session this AM. Pt was found to be upset; therapy told pt they will come back when she feels better. Pt agreed to this plan.      Isabel Crowder OTR/L  Electronically signed by Robert Jarquin OTR/L on 10/28/2021 at 8:44 AM.

## 2021-10-28 NOTE — PLAN OF CARE
Nutrition Problem #1: Inadequate protein-energy intake  Intervention: Food and/or Nutrient Delivery: Continue Current Diet, Continue Oral Nutrition Supplement  Nutritional Goals: PO 50% or more, wt stable or gain

## 2021-10-28 NOTE — PROGRESS NOTES
Physical Therapy  Esta Ranks  [id number]     10/28/21 0846   Subjective   Subjective Attempt, patient very emotional and tearful at this time. Will cont to follow.    Electronically signed by Maggie Love PTA on 10/28/2021 at 8:47 AM

## 2021-10-28 NOTE — PROGRESS NOTES
Patient states she does not want to take dilaudid after a previous dose resulted in her receiving Narcan. Dose had already been drawn up when patient disclosed this information, so Will Heard witnessed the waste.    Electronically signed by Jun Duarte RN on 10/28/2021 at 6:22 PM
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
## 2021-10-29 LAB
ANION GAP SERPL CALCULATED.3IONS-SCNC: 8 MMOL/L (ref 7–19)
BASOPHILS ABSOLUTE: 0 K/UL (ref 0–0.2)
BASOPHILS RELATIVE PERCENT: 0.4 % (ref 0–1)
BUN BLDV-MCNC: 14 MG/DL (ref 8–23)
CALCIUM SERPL-MCNC: 8.1 MG/DL (ref 8.8–10.2)
CHLORIDE BLD-SCNC: 99 MMOL/L (ref 98–111)
CO2: 29 MMOL/L (ref 22–29)
CREAT SERPL-MCNC: 0.7 MG/DL (ref 0.5–0.9)
EOSINOPHILS ABSOLUTE: 0.3 K/UL (ref 0–0.6)
EOSINOPHILS RELATIVE PERCENT: 3 % (ref 0–5)
GFR AFRICAN AMERICAN: >59
GFR NON-AFRICAN AMERICAN: >60
GLUCOSE BLD-MCNC: 106 MG/DL (ref 74–109)
HCT VFR BLD CALC: 26 % (ref 37–47)
HEMOGLOBIN: 8.4 G/DL (ref 12–16)
IMMATURE GRANULOCYTES #: 0 K/UL
LYMPHOCYTES ABSOLUTE: 2.3 K/UL (ref 1.1–4.5)
LYMPHOCYTES RELATIVE PERCENT: 22.7 % (ref 20–40)
MCH RBC QN AUTO: 30.2 PG (ref 27–31)
MCHC RBC AUTO-ENTMCNC: 32.3 G/DL (ref 33–37)
MCV RBC AUTO: 93.5 FL (ref 81–99)
MONOCYTES ABSOLUTE: 1.1 K/UL (ref 0–0.9)
MONOCYTES RELATIVE PERCENT: 10.7 % (ref 0–10)
NEUTROPHILS ABSOLUTE: 6.4 K/UL (ref 1.5–7.5)
NEUTROPHILS RELATIVE PERCENT: 62.8 % (ref 50–65)
PDW BLD-RTO: 13.5 % (ref 11.5–14.5)
PLATELET # BLD: 324 K/UL (ref 130–400)
PMV BLD AUTO: 10.1 FL (ref 9.4–12.3)
POTASSIUM SERPL-SCNC: 4.6 MMOL/L (ref 3.5–5)
RBC # BLD: 2.78 M/UL (ref 4.2–5.4)
SODIUM BLD-SCNC: 136 MMOL/L (ref 136–145)
WBC # BLD: 10.2 K/UL (ref 4.8–10.8)

## 2021-10-29 PROCEDURE — 2060000000 HC ICU INTERMEDIATE R&B

## 2021-10-29 PROCEDURE — 2500000003 HC RX 250 WO HCPCS: Performed by: ORTHOPAEDIC SURGERY

## 2021-10-29 PROCEDURE — 2580000003 HC RX 258: Performed by: ORTHOPAEDIC SURGERY

## 2021-10-29 PROCEDURE — 97535 SELF CARE MNGMENT TRAINING: CPT

## 2021-10-29 PROCEDURE — 97530 THERAPEUTIC ACTIVITIES: CPT

## 2021-10-29 PROCEDURE — 6370000000 HC RX 637 (ALT 250 FOR IP): Performed by: ORTHOPAEDIC SURGERY

## 2021-10-29 PROCEDURE — 85025 COMPLETE CBC W/AUTO DIFF WBC: CPT

## 2021-10-29 PROCEDURE — 6370000000 HC RX 637 (ALT 250 FOR IP): Performed by: HOSPITALIST

## 2021-10-29 PROCEDURE — 36415 COLL VENOUS BLD VENIPUNCTURE: CPT

## 2021-10-29 PROCEDURE — 80048 BASIC METABOLIC PNL TOTAL CA: CPT

## 2021-10-29 PROCEDURE — 6370000000 HC RX 637 (ALT 250 FOR IP): Performed by: STUDENT IN AN ORGANIZED HEALTH CARE EDUCATION/TRAINING PROGRAM

## 2021-10-29 RX ORDER — SENNA PLUS 8.6 MG/1
1 TABLET ORAL DAILY PRN
Status: DISCONTINUED | OUTPATIENT
Start: 2021-10-29 | End: 2021-11-06 | Stop reason: HOSPADM

## 2021-10-29 RX ORDER — POLYETHYLENE GLYCOL 3350 17 G/17G
17 POWDER, FOR SOLUTION ORAL DAILY
Status: DISCONTINUED | OUTPATIENT
Start: 2021-10-29 | End: 2021-11-01

## 2021-10-29 RX ADMIN — METOPROLOL TARTRATE 25 MG: 25 TABLET, FILM COATED ORAL at 10:23

## 2021-10-29 RX ADMIN — FAMOTIDINE 20 MG: 10 INJECTION, SOLUTION INTRAVENOUS at 20:28

## 2021-10-29 RX ADMIN — SODIUM CHLORIDE, PRESERVATIVE FREE 10 ML: 5 INJECTION INTRAVENOUS at 20:28

## 2021-10-29 RX ADMIN — APIXABAN 5 MG: 5 TABLET, FILM COATED ORAL at 20:49

## 2021-10-29 RX ADMIN — SODIUM CHLORIDE, PRESERVATIVE FREE 10 ML: 5 INJECTION INTRAVENOUS at 11:00

## 2021-10-29 RX ADMIN — OXYCODONE 10 MG: 5 TABLET ORAL at 18:16

## 2021-10-29 RX ADMIN — APIXABAN 5 MG: 5 TABLET, FILM COATED ORAL at 10:23

## 2021-10-29 RX ADMIN — OXYCODONE 10 MG: 5 TABLET ORAL at 03:19

## 2021-10-29 RX ADMIN — OXYCODONE 10 MG: 5 TABLET ORAL at 10:23

## 2021-10-29 RX ADMIN — METOPROLOL TARTRATE 25 MG: 25 TABLET, FILM COATED ORAL at 20:49

## 2021-10-29 RX ADMIN — FERROUS SULFATE TAB 325 MG (65 MG ELEMENTAL FE) 325 MG: 325 (65 FE) TAB at 10:23

## 2021-10-29 RX ADMIN — LISINOPRIL 2.5 MG: 2.5 TABLET ORAL at 10:23

## 2021-10-29 RX ADMIN — ACETAMINOPHEN 500 MG: 500 TABLET ORAL at 00:21

## 2021-10-29 RX ADMIN — FAMOTIDINE 20 MG: 10 INJECTION, SOLUTION INTRAVENOUS at 10:23

## 2021-10-29 RX ADMIN — ATORVASTATIN CALCIUM 20 MG: 20 TABLET, FILM COATED ORAL at 10:23

## 2021-10-29 RX ADMIN — CYCLOBENZAPRINE 10 MG: 10 TABLET, FILM COATED ORAL at 10:23

## 2021-10-29 RX ADMIN — SENNOSIDES 8.6 MG: 8.6 TABLET, FILM COATED ORAL at 12:35

## 2021-10-29 ASSESSMENT — PAIN DESCRIPTION - LOCATION
LOCATION: LEG
LOCATION: LEG

## 2021-10-29 ASSESSMENT — PAIN SCALES - GENERAL
PAINLEVEL_OUTOF10: 6
PAINLEVEL_OUTOF10: 6
PAINLEVEL_OUTOF10: 7
PAINLEVEL_OUTOF10: 0
PAINLEVEL_OUTOF10: 10
PAINLEVEL_OUTOF10: 5
PAINLEVEL_OUTOF10: 10
PAINLEVEL_OUTOF10: 0
PAINLEVEL_OUTOF10: 9

## 2021-10-29 ASSESSMENT — PAIN DESCRIPTION - DESCRIPTORS
DESCRIPTORS: ACHING;SORE
DESCRIPTORS: ACHING;SORE

## 2021-10-29 ASSESSMENT — PAIN DESCRIPTION - ORIENTATION
ORIENTATION: RIGHT
ORIENTATION: RIGHT

## 2021-10-29 ASSESSMENT — PAIN DESCRIPTION - PAIN TYPE: TYPE: SURGICAL PAIN

## 2021-10-29 ASSESSMENT — PAIN DESCRIPTION - FREQUENCY: FREQUENCY: INTERMITTENT
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
## 2021-10-29 NOTE — PROGRESS NOTES
Physical Therapy   Name: Merly Elkins  MRN:  [id number]  Date of service:  10/29/2021     10/29/21 1153   Restrictions/Precautions   Restrictions/Precautions Fall Risk;Weight Bearing   Required Braces or Orthoses? Yes   Lower Extremity Weight Bearing Restrictions   Right Lower Extremity Weight Bearing Non Weight Bearing   Upper Extremity Weight Bearing Restrictions   Right Upper Extremity Weight Bearing Non Weight Bearing   Required Braces or Orthoses   Right Lower Extremity Brace Knee Immobilizer  (KI trimmed to fit better)   Right Upper Extremity Brace/Splint Sling   General   Chart Reviewed Yes   Response To Previous Treatment Patient with no complaints from previous session. Family / Caregiver Present No   Referring Practitioner Dr. John Lund. willing to sit up on the EOB. General Comment   Comments RN, rudolph Daley PT. Pain Screening   Patient Currently in Pain Yes   Intervention List Patient able to continue with treatment;Nurse called to administer meds   Pain Assessment   Pain Assessment 0-10   Pain Level 6   Pain Type Surgical pain   Pain Location Leg   Pain Orientation Right   Pain Descriptors Aching; Sore   Functional Pain Assessment Prevents or interferes some active activities and ADLs   Non-Pharmaceutical Pain Intervention(s) Ambulation/Increased Activity;Repositioned   Response to Pain Intervention Patient Satisfied   Pain Frequency Intermittent  (with mobility)   Oxygen Therapy   O2 Device None (Room air)   Bed Mobility   Bridging Contact guard assistance   Rolling Contact guard assistance  (to R)   Supine to Sit Minimal assistance   Sit to Supine Minimal assistance   Scooting Contact guard assistance;Minimal assistance   Comment sat on EOB x 10 mins SBA, worked on scooting to [employer] to [place] Minimal Assistance;2 Person Assistance  (RLE guarded to avoid WB)   Stand to sit Minimal Assistance   Bed to Chair Contact guard assistance;Minimal assistance;2 Person Assistance  (RLE guarded to avoid WB, vc for hand placement)   Comment pt stood once x 20 sec, NWB RLE   Ambulation   Ambulation? No   WB Status NWB RUE through hand, NWB RLE   Balance   Posture Good   Sitting - Static -;Good   Sitting - Dynamic +;Fair   Standing - Static Poor;+   Standing - Dynamic Poor;+   Patient Goals    Patient goals  go home after getting rehab at SNF   Short term goals   Time Frame for Short term goals 2 wks   Short term goal 1 supine to sit indep   Short term goal 2 stand pivot bed to chair transfer indep   Short term goal 3 amb. 10' with RUE platform RW SBA   Conditions Requiring Skilled Therapeutic Intervention   Body structures, Functions, Activity limitations Decreased functional mobility ; Decreased ROM; Decreased strength;Decreased cognition;Decreased safe awareness;Decreased balance; Increased pain;Decreased posture   Assessment Pt. tolerated mobility very well today and was able to perform lateral transfer to a chair. Pt. did very well with maintaining NWB RUE and RLE. Recommend use fo sliding board and drop arm chair or w/c and working on stand pivot transfers. Treatment Diagnosis impaired mobility    Prognosis Fair   Decision Making Medium Complexity   Barriers to Learning none noted   REQUIRES PT FOLLOW UP Yes   Treatment Initiated  bed mobility, EOB sitting, transfers   Discharge Recommendations Continue to assess pending progress;24 hour supervision or assist;Patient would benefit from continued therapy after discharge   Activity Tolerance   Activity Tolerance Patient Tolerated treatment well   Plan   Times per week 3-7   Times per day Daily   Plan weeks 2   Current Treatment Recommendations Strengthening;ROM;Balance Training;Functional Mobility Training;Transfer Training; Endurance Training;Gait Training; Safety Education & Training;Positioning;Equipment Evaluation, Education, & procurement;Patient/Caregiver Education & Training   Plan Comment cont. PT per POC.    Safety Devices Type of devices Bed alarm in place;Call light within reach;Gait belt;Left in bed  (pillow under RUE and one under BLEs)   PT Whiteboard Notes   Therapy Whiteboard RE 11/7 R wrist and R femur ORIF, sling RUE, KI RLE, RUE/RLE NWB, DOUBLE, sliding board vs R platform RW     Electronically signed by Diane Wilcox, PT on 10/29/2021 at 2:02 PM

## 2021-10-29 NOTE — PROGRESS NOTES
Orthopedic Surgery Progress Note    Sharona Guillen  10/29/2021      Subjective:     Systemic or Specific Complaints: STABLE THIS MORNING. PAIN IS CONTROLLED. ALL QUESTIONS ANSWERED. WILL SEE IN OFFICE AS SCHEDULED. Objective:     Patient Vitals for the past 24 hrs:   BP Temp Temp src Pulse Resp SpO2   10/29/21 0619 136/72 98 °F (36.7 °C)  68 18 96 %   10/29/21 0018 136/76 98 °F (36.7 °C)  78 20 94 %   10/28/21 1713 (!) 154/71 97.6 °F (36.4 °C) Temporal 92 20 92 %   10/28/21 1426 130/76 97.8 °F (36.6 °C) Temporal 77 20 91 %   10/28/21 1030 129/76 96.5 °F (35.8 °C) Temporal 88 20 93 %       right upper  General: alert, appears stated age and cooperative   Wound: clean, dry, intact             Dressing: clean, dry, and intact   Extremity: Distal NVI           DVT Exam: No evidence of DVT seen on physical exam.                   Data Review:  Recent Labs     10/28/21  0212 10/29/21  0132   HGB 9.7* 8.4*     Recent Labs     10/29/21  0132      K 4.6   CREATININE 0.7       Assessment:     POD# 6   1.  Open reduction and internal fixation of right intra-articular distal  femur fracture.   2.  Open reduction and internal fixation of left three-part  extra-articular distal radius fracture     Plan:      1:  DVT prophylaxis, ICE, elevate  2:  Pain control  3:  Physical therapy/Occupational therapy  4:  Anticipate discharge today if pain well controlled  5: Non-weight bearing       Electronically signed by Amy Hess PA-C on 10/29/2021 at 9:00 AM
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
## 2021-10-30 LAB
ANION GAP SERPL CALCULATED.3IONS-SCNC: 8 MMOL/L (ref 7–19)
BASOPHILS ABSOLUTE: 0.1 K/UL (ref 0–0.2)
BASOPHILS RELATIVE PERCENT: 0.5 % (ref 0–1)
BUN BLDV-MCNC: 21 MG/DL (ref 8–23)
CALCIUM SERPL-MCNC: 8.5 MG/DL (ref 8.8–10.2)
CHLORIDE BLD-SCNC: 97 MMOL/L (ref 98–111)
CO2: 31 MMOL/L (ref 22–29)
CREAT SERPL-MCNC: 0.9 MG/DL (ref 0.5–0.9)
EOSINOPHILS ABSOLUTE: 0.4 K/UL (ref 0–0.6)
EOSINOPHILS RELATIVE PERCENT: 3.4 % (ref 0–5)
GFR AFRICAN AMERICAN: >59
GFR NON-AFRICAN AMERICAN: >60
GLUCOSE BLD-MCNC: 97 MG/DL (ref 74–109)
HCT VFR BLD CALC: 26.3 % (ref 37–47)
HEMOGLOBIN: 8.3 G/DL (ref 12–16)
IMMATURE GRANULOCYTES #: 0.1 K/UL
LYMPHOCYTES ABSOLUTE: 2.3 K/UL (ref 1.1–4.5)
LYMPHOCYTES RELATIVE PERCENT: 20.3 % (ref 20–40)
MCH RBC QN AUTO: 29.6 PG (ref 27–31)
MCHC RBC AUTO-ENTMCNC: 31.6 G/DL (ref 33–37)
MCV RBC AUTO: 93.9 FL (ref 81–99)
MONOCYTES ABSOLUTE: 1.1 K/UL (ref 0–0.9)
MONOCYTES RELATIVE PERCENT: 10 % (ref 0–10)
NEUTROPHILS ABSOLUTE: 7.3 K/UL (ref 1.5–7.5)
NEUTROPHILS RELATIVE PERCENT: 65.4 % (ref 50–65)
PDW BLD-RTO: 13.6 % (ref 11.5–14.5)
PLATELET # BLD: 431 K/UL (ref 130–400)
PMV BLD AUTO: 10.1 FL (ref 9.4–12.3)
POTASSIUM SERPL-SCNC: 5.1 MMOL/L (ref 3.5–5)
RBC # BLD: 2.8 M/UL (ref 4.2–5.4)
SODIUM BLD-SCNC: 136 MMOL/L (ref 136–145)
WBC # BLD: 11.2 K/UL (ref 4.8–10.8)

## 2021-10-30 PROCEDURE — 6370000000 HC RX 637 (ALT 250 FOR IP): Performed by: HOSPITALIST

## 2021-10-30 PROCEDURE — 85025 COMPLETE CBC W/AUTO DIFF WBC: CPT

## 2021-10-30 PROCEDURE — 2060000000 HC ICU INTERMEDIATE R&B

## 2021-10-30 PROCEDURE — 80048 BASIC METABOLIC PNL TOTAL CA: CPT

## 2021-10-30 PROCEDURE — 6370000000 HC RX 637 (ALT 250 FOR IP): Performed by: STUDENT IN AN ORGANIZED HEALTH CARE EDUCATION/TRAINING PROGRAM

## 2021-10-30 PROCEDURE — 2580000003 HC RX 258: Performed by: ORTHOPAEDIC SURGERY

## 2021-10-30 PROCEDURE — 2500000003 HC RX 250 WO HCPCS: Performed by: ORTHOPAEDIC SURGERY

## 2021-10-30 PROCEDURE — 6370000000 HC RX 637 (ALT 250 FOR IP): Performed by: ORTHOPAEDIC SURGERY

## 2021-10-30 PROCEDURE — 36415 COLL VENOUS BLD VENIPUNCTURE: CPT

## 2021-10-30 RX ADMIN — LISINOPRIL 2.5 MG: 2.5 TABLET ORAL at 08:25

## 2021-10-30 RX ADMIN — SODIUM CHLORIDE, PRESERVATIVE FREE 10 ML: 5 INJECTION INTRAVENOUS at 08:26

## 2021-10-30 RX ADMIN — OXYCODONE 10 MG: 5 TABLET ORAL at 08:36

## 2021-10-30 RX ADMIN — METOPROLOL TARTRATE 25 MG: 25 TABLET, FILM COATED ORAL at 08:25

## 2021-10-30 RX ADMIN — APIXABAN 5 MG: 5 TABLET, FILM COATED ORAL at 20:34

## 2021-10-30 RX ADMIN — FAMOTIDINE 20 MG: 10 INJECTION, SOLUTION INTRAVENOUS at 20:35

## 2021-10-30 RX ADMIN — OXYCODONE 10 MG: 5 TABLET ORAL at 02:47

## 2021-10-30 RX ADMIN — APIXABAN 5 MG: 5 TABLET, FILM COATED ORAL at 08:25

## 2021-10-30 RX ADMIN — ATORVASTATIN CALCIUM 20 MG: 20 TABLET, FILM COATED ORAL at 08:25

## 2021-10-30 RX ADMIN — SODIUM CHLORIDE, PRESERVATIVE FREE 10 ML: 5 INJECTION INTRAVENOUS at 20:35

## 2021-10-30 RX ADMIN — POLYETHYLENE GLYCOL 3350 17 G: 17 POWDER, FOR SOLUTION ORAL at 08:25

## 2021-10-30 RX ADMIN — OXYCODONE 10 MG: 5 TABLET ORAL at 20:34

## 2021-10-30 RX ADMIN — FERROUS SULFATE TAB 325 MG (65 MG ELEMENTAL FE) 325 MG: 325 (65 FE) TAB at 08:25

## 2021-10-30 RX ADMIN — FAMOTIDINE 20 MG: 10 INJECTION, SOLUTION INTRAVENOUS at 08:25

## 2021-10-30 ASSESSMENT — PAIN DESCRIPTION - PROGRESSION
CLINICAL_PROGRESSION: NOT CHANGED
CLINICAL_PROGRESSION: OTHER (COMMENT)

## 2021-10-30 ASSESSMENT — PAIN DESCRIPTION - ORIENTATION
ORIENTATION: RIGHT

## 2021-10-30 ASSESSMENT — PAIN DESCRIPTION - LOCATION
LOCATION: LEG
LOCATION: LEG;ARM
LOCATION: LEG;ARM

## 2021-10-30 ASSESSMENT — PAIN DESCRIPTION - FREQUENCY
FREQUENCY: CONTINUOUS
FREQUENCY: CONTINUOUS

## 2021-10-30 ASSESSMENT — PAIN DESCRIPTION - DIRECTION
RADIATING_TOWARDS: NO
RADIATING_TOWARDS: NO

## 2021-10-30 ASSESSMENT — PAIN DESCRIPTION - PAIN TYPE
TYPE: SURGICAL PAIN

## 2021-10-30 ASSESSMENT — PAIN DESCRIPTION - ONSET
ONSET: ON-GOING
ONSET: ON-GOING

## 2021-10-30 ASSESSMENT — PAIN SCALES - GENERAL
PAINLEVEL_OUTOF10: 0
PAINLEVEL_OUTOF10: 10
PAINLEVEL_OUTOF10: 5
PAINLEVEL_OUTOF10: 10
PAINLEVEL_OUTOF10: 8
PAINLEVEL_OUTOF10: 7
PAINLEVEL_OUTOF10: 0

## 2021-10-30 ASSESSMENT — PAIN DESCRIPTION - DESCRIPTORS
DESCRIPTORS: THROBBING;CONSTANT;STABBING
DESCRIPTORS: THROBBING;CONSTANT

## 2021-10-30 ASSESSMENT — PAIN SCALES - WONG BAKER: WONGBAKER_NUMERICALRESPONSE: 4
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
## 2021-10-30 NOTE — PROGRESS NOTES
12 hour chart check review completed. Electronically signed by Marcie Ibarra LPN on 35/13/4638 at 12:58 AM
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
## 2021-10-31 LAB
ANION GAP SERPL CALCULATED.3IONS-SCNC: 10 MMOL/L (ref 7–19)
BASOPHILS ABSOLUTE: 0.1 K/UL (ref 0–0.2)
BASOPHILS RELATIVE PERCENT: 0.5 % (ref 0–1)
BUN BLDV-MCNC: 26 MG/DL (ref 8–23)
CALCIUM SERPL-MCNC: 8.6 MG/DL (ref 8.8–10.2)
CHLORIDE BLD-SCNC: 95 MMOL/L (ref 98–111)
CO2: 29 MMOL/L (ref 22–29)
CREAT SERPL-MCNC: 1.1 MG/DL (ref 0.5–0.9)
EKG P AXIS: 73 DEGREES
EKG P-R INTERVAL: 170 MS
EKG Q-T INTERVAL: 376 MS
EKG QRS DURATION: 72 MS
EKG QTC CALCULATION (BAZETT): 414 MS
EKG T AXIS: 57 DEGREES
EOSINOPHILS ABSOLUTE: 0.4 K/UL (ref 0–0.6)
EOSINOPHILS RELATIVE PERCENT: 3.2 % (ref 0–5)
GFR AFRICAN AMERICAN: >59
GFR NON-AFRICAN AMERICAN: 50
GLUCOSE BLD-MCNC: 106 MG/DL (ref 74–109)
HCT VFR BLD CALC: 28.4 % (ref 37–47)
HEMOGLOBIN: 8.9 G/DL (ref 12–16)
IMMATURE GRANULOCYTES #: 0.1 K/UL
LYMPHOCYTES ABSOLUTE: 2.5 K/UL (ref 1.1–4.5)
LYMPHOCYTES RELATIVE PERCENT: 19.5 % (ref 20–40)
MCH RBC QN AUTO: 30.2 PG (ref 27–31)
MCHC RBC AUTO-ENTMCNC: 31.3 G/DL (ref 33–37)
MCV RBC AUTO: 96.3 FL (ref 81–99)
MONOCYTES ABSOLUTE: 1.3 K/UL (ref 0–0.9)
MONOCYTES RELATIVE PERCENT: 9.8 % (ref 0–10)
NEUTROPHILS ABSOLUTE: 8.5 K/UL (ref 1.5–7.5)
NEUTROPHILS RELATIVE PERCENT: 66.5 % (ref 50–65)
PDW BLD-RTO: 13.6 % (ref 11.5–14.5)
PLATELET # BLD: 528 K/UL (ref 130–400)
PMV BLD AUTO: 10 FL (ref 9.4–12.3)
POTASSIUM SERPL-SCNC: 4.7 MMOL/L (ref 3.5–5)
POTASSIUM SERPL-SCNC: 5.8 MMOL/L (ref 3.5–5)
RBC # BLD: 2.95 M/UL (ref 4.2–5.4)
SODIUM BLD-SCNC: 134 MMOL/L (ref 136–145)
WBC # BLD: 12.8 K/UL (ref 4.8–10.8)

## 2021-10-31 PROCEDURE — 2060000000 HC ICU INTERMEDIATE R&B

## 2021-10-31 PROCEDURE — 97530 THERAPEUTIC ACTIVITIES: CPT

## 2021-10-31 PROCEDURE — 84132 ASSAY OF SERUM POTASSIUM: CPT

## 2021-10-31 PROCEDURE — 2500000003 HC RX 250 WO HCPCS: Performed by: INTERNAL MEDICINE

## 2021-10-31 PROCEDURE — 6370000000 HC RX 637 (ALT 250 FOR IP): Performed by: HOSPITALIST

## 2021-10-31 PROCEDURE — 93005 ELECTROCARDIOGRAM TRACING: CPT | Performed by: STUDENT IN AN ORGANIZED HEALTH CARE EDUCATION/TRAINING PROGRAM

## 2021-10-31 PROCEDURE — 6370000000 HC RX 637 (ALT 250 FOR IP): Performed by: STUDENT IN AN ORGANIZED HEALTH CARE EDUCATION/TRAINING PROGRAM

## 2021-10-31 PROCEDURE — 36415 COLL VENOUS BLD VENIPUNCTURE: CPT

## 2021-10-31 PROCEDURE — 2580000003 HC RX 258: Performed by: ORTHOPAEDIC SURGERY

## 2021-10-31 PROCEDURE — 85025 COMPLETE CBC W/AUTO DIFF WBC: CPT

## 2021-10-31 PROCEDURE — 93010 ELECTROCARDIOGRAM REPORT: CPT | Performed by: INTERNAL MEDICINE

## 2021-10-31 PROCEDURE — 80048 BASIC METABOLIC PNL TOTAL CA: CPT

## 2021-10-31 PROCEDURE — 6370000000 HC RX 637 (ALT 250 FOR IP): Performed by: INTERNAL MEDICINE

## 2021-10-31 PROCEDURE — 2580000003 HC RX 258: Performed by: STUDENT IN AN ORGANIZED HEALTH CARE EDUCATION/TRAINING PROGRAM

## 2021-10-31 PROCEDURE — 6370000000 HC RX 637 (ALT 250 FOR IP): Performed by: ORTHOPAEDIC SURGERY

## 2021-10-31 RX ORDER — CALCIUM GLUCONATE 20 MG/ML
1000 INJECTION, SOLUTION INTRAVENOUS ONCE
Status: COMPLETED | OUTPATIENT
Start: 2021-10-31 | End: 2021-10-31

## 2021-10-31 RX ORDER — SODIUM POLYSTYRENE SULFONATE 15 G/60ML
15 SUSPENSION ORAL; RECTAL ONCE
Status: DISCONTINUED | OUTPATIENT
Start: 2021-10-31 | End: 2021-10-31

## 2021-10-31 RX ORDER — SODIUM POLYSTYRENE SULFONATE 15 G/60ML
30 SUSPENSION ORAL; RECTAL ONCE
Status: COMPLETED | OUTPATIENT
Start: 2021-10-31 | End: 2021-10-31

## 2021-10-31 RX ORDER — 0.9 % SODIUM CHLORIDE 0.9 %
500 INTRAVENOUS SOLUTION INTRAVENOUS ONCE
Status: COMPLETED | OUTPATIENT
Start: 2021-10-31 | End: 2021-10-31

## 2021-10-31 RX ADMIN — SODIUM CHLORIDE, PRESERVATIVE FREE 10 ML: 5 INJECTION INTRAVENOUS at 20:32

## 2021-10-31 RX ADMIN — OXYCODONE 10 MG: 5 TABLET ORAL at 10:33

## 2021-10-31 RX ADMIN — POLYETHYLENE GLYCOL 3350 17 G: 17 POWDER, FOR SOLUTION ORAL at 08:52

## 2021-10-31 RX ADMIN — OXYCODONE 10 MG: 5 TABLET ORAL at 06:31

## 2021-10-31 RX ADMIN — SODIUM CHLORIDE 500 ML: 9 INJECTION, SOLUTION INTRAVENOUS at 10:34

## 2021-10-31 RX ADMIN — SODIUM CHLORIDE, PRESERVATIVE FREE 10 ML: 5 INJECTION INTRAVENOUS at 08:52

## 2021-10-31 RX ADMIN — SODIUM POLYSTYRENE SULFONATE 30 G: 15 SUSPENSION ORAL; RECTAL at 06:31

## 2021-10-31 RX ADMIN — NALOXEGOL OXALATE 12.5 MG: 12.5 TABLET, FILM COATED ORAL at 11:41

## 2021-10-31 RX ADMIN — APIXABAN 5 MG: 5 TABLET, FILM COATED ORAL at 08:53

## 2021-10-31 RX ADMIN — FERROUS SULFATE TAB 325 MG (65 MG ELEMENTAL FE) 325 MG: 325 (65 FE) TAB at 08:52

## 2021-10-31 RX ADMIN — OXYCODONE 10 MG: 5 TABLET ORAL at 03:00

## 2021-10-31 RX ADMIN — ATORVASTATIN CALCIUM 20 MG: 20 TABLET, FILM COATED ORAL at 08:52

## 2021-10-31 RX ADMIN — APIXABAN 5 MG: 5 TABLET, FILM COATED ORAL at 20:31

## 2021-10-31 RX ADMIN — FAMOTIDINE 20 MG: 10 INJECTION, SOLUTION INTRAVENOUS at 20:32

## 2021-10-31 RX ADMIN — LISINOPRIL 2.5 MG: 2.5 TABLET ORAL at 08:52

## 2021-10-31 RX ADMIN — OXYCODONE 10 MG: 5 TABLET ORAL at 17:15

## 2021-10-31 RX ADMIN — FERROUS SULFATE TAB 325 MG (65 MG ELEMENTAL FE) 325 MG: 325 (65 FE) TAB at 17:15

## 2021-10-31 RX ADMIN — CALCIUM GLUCONATE 1000 MG: 20 INJECTION, SOLUTION INTRAVENOUS at 06:31

## 2021-10-31 RX ADMIN — OXYCODONE 10 MG: 5 TABLET ORAL at 21:26

## 2021-10-31 ASSESSMENT — PAIN DESCRIPTION - LOCATION
LOCATION: ARM;LEG
LOCATION: LEG;ARM
LOCATION: ARM;LEG
LOCATION: ARM;LEG

## 2021-10-31 ASSESSMENT — PAIN DESCRIPTION - ORIENTATION
ORIENTATION: RIGHT

## 2021-10-31 ASSESSMENT — PAIN - FUNCTIONAL ASSESSMENT
PAIN_FUNCTIONAL_ASSESSMENT: PREVENTS OR INTERFERES WITH ALL ACTIVE AND SOME PASSIVE ACTIVITIES
PAIN_FUNCTIONAL_ASSESSMENT: PREVENTS OR INTERFERES SOME ACTIVE ACTIVITIES AND ADLS

## 2021-10-31 ASSESSMENT — PAIN DESCRIPTION - PAIN TYPE
TYPE: SURGICAL PAIN

## 2021-10-31 ASSESSMENT — PAIN SCALES - GENERAL
PAINLEVEL_OUTOF10: 5
PAINLEVEL_OUTOF10: 7
PAINLEVEL_OUTOF10: 10
PAINLEVEL_OUTOF10: 10
PAINLEVEL_OUTOF10: 9
PAINLEVEL_OUTOF10: 7
PAINLEVEL_OUTOF10: 0
PAINLEVEL_OUTOF10: 0

## 2021-10-31 ASSESSMENT — PAIN SCALES - WONG BAKER
WONGBAKER_NUMERICALRESPONSE: 6
WONGBAKER_NUMERICALRESPONSE: 4

## 2021-10-31 ASSESSMENT — PAIN DESCRIPTION - ONSET: ONSET: ON-GOING

## 2021-10-31 ASSESSMENT — PAIN DESCRIPTION - FREQUENCY
FREQUENCY: CONTINUOUS
FREQUENCY: CONTINUOUS

## 2021-10-31 ASSESSMENT — PAIN DESCRIPTION - PROGRESSION: CLINICAL_PROGRESSION: NOT CHANGED

## 2021-10-31 ASSESSMENT — PAIN DESCRIPTION - DESCRIPTORS
DESCRIPTORS: THROBBING;CONSTANT
DESCRIPTORS: CONSTANT;THROBBING

## 2021-10-31 ASSESSMENT — PAIN DESCRIPTION - DIRECTION: RADIATING_TOWARDS: NO
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
## 2021-10-31 NOTE — PROGRESS NOTES
Physical Therapy  Name: Ranell Galeazzi  MRN:  [id number]  Date of service:  10/31/2021     10/31/21 1323   Restrictions/Precautions   Restrictions/Precautions Fall Risk;Weight Bearing   Required Braces or Orthoses? Yes   Lower Extremity Weight Bearing Restrictions   Right Lower Extremity Weight Bearing Non Weight Bearing   Upper Extremity Weight Bearing Restrictions   Right Upper Extremity Weight Bearing Non Weight Bearing   Required Braces or Orthoses   Right Lower Extremity Brace Knee Immobilizer   Right Upper Extremity Brace/Splint Sling   Subjective   Subjective Pt willing to sit on EOB, then wanted to transfer to [facility]. NSG assisted with treatment. Pain Screening   Patient Currently in Pain Yes   Pain Assessment   Pain Assessment Faces   Pain Type Surgical pain   Pain Location Arm;Leg   Pain Orientation Right   Pain Descriptors Constant; Throbbing   Functional Pain Assessment Prevents or interferes some active activities and ADLs   Non-Pharmaceutical Pain Intervention(s) Elevation;Repositioned;Rest;Ambulation/Increased Activity   Response to Pain Intervention Patient Satisfied   Pain Frequency Continuous   Roman-Baker Pain Rating 6   Bed Mobility   Bridging Contact guard assistance   Rolling Contact guard assistance   Supine to Sit Minimal assistance   Sit to Supine Minimal assistance   Scooting Contact guard assistance   Comment Pt sat EOB and then requested to transfer to [facility]   Transfers   Sit to Stand Minimal Assistance;2 Person Assistance   Stand to sit Minimal Assistance;2 Person Assistance   Stand Pivot Transfers Minimal Assistance;2 Person Assistance   Comment stand pivot to [facility], pt sat on BSC with RLE elevated    Ambulation   Ambulation? No   WB Status NWB RUE through hand, NWB RLE   Short term goals   Time Frame for Short term goals 2 wks   Short term goal 1 supine to sit indep   Short term goal 2 stand pivot bed to chair transfer indep   Short term goal 3 amb.  10' with RUE platform RW SBA   Conditions Requiring Skilled Therapeutic Intervention   Body structures, Functions, Activity limitations Decreased functional mobility ; Decreased ROM; Decreased strength;Decreased cognition;Decreased safe awareness;Decreased balance; Increased pain;Decreased posture   Assessment Pt worked on bed mobility and standing for transfers. Pt able to perform bed mobility well but requires guidance of RLE. Pt transferred to [facility] with assist guiding RLE. Pt transferred back to bed and assisted with brief placement and repositioning. Pt able to roll and bridge for brief placement and able to scoot up in bed with cueing to use LUE/LLE. Pt positioned with RUE/RLE elevated. Activity Tolerance   Activity Tolerance Patient Tolerated treatment well   Safety Devices   Type of devices Bed alarm in place;Call light within reach; Left in bed;Nurse notified         Electronically signed by Henrry Kimble PTA on 10/31/2021 at 1:39 PM

## 2021-10-31 NOTE — PROGRESS NOTES
Pharmacy Renal Adjustment    Alfonso Francisco is a 59 y.o. female. Pharmacy has renally adjusted medications per protocol. Recent Labs     10/30/21  0333 10/31/21  0244   BUN 21 26*       Recent Labs     10/30/21  0333 10/31/21  0244   CREATININE 0.9 1.1*       Estimated Creatinine Clearance: 49 mL/min (A) (based on SCr of 1.1 mg/dL (H)).     Height:   Ht Readings from Last 1 Encounters:   10/23/21 5' 6\" (1.676 m)     Weight:  Wt Readings from Last 1 Encounters:   10/28/21 134 lb (60.8 kg)     Plan: Adjust the following medications based on renal function:           Famotidine 20 mg IV twice daily to once daily    Electronically signed by Rodolfo Weaver [facility] on 10/31/2021 at 6:15 AM

## 2021-10-31 NOTE — PROGRESS NOTES
12 hour chart check review completed. Electronically signed by Jinny Malagon LPN on 29/38/2177 at 1:05 AM This note was copied from the mother's chart.  LC to see patient and assist with latch.  Her baby latched well in LD.  LC assisted with hand expression to entice latch and placed baby in a laid back cradle hold.  Baby has active and nutritive sucking.  Plan for follow up tomorrow or prn.

## 2021-11-01 LAB
ANION GAP SERPL CALCULATED.3IONS-SCNC: 6 MMOL/L (ref 7–19)
BASOPHILS ABSOLUTE: 0.1 K/UL (ref 0–0.2)
BASOPHILS RELATIVE PERCENT: 0.5 % (ref 0–1)
BUN BLDV-MCNC: 22 MG/DL (ref 8–23)
CALCIUM SERPL-MCNC: 8.5 MG/DL (ref 8.8–10.2)
CHLORIDE BLD-SCNC: 96 MMOL/L (ref 98–111)
CO2: 32 MMOL/L (ref 22–29)
CREAT SERPL-MCNC: 1 MG/DL (ref 0.5–0.9)
EOSINOPHILS ABSOLUTE: 0.3 K/UL (ref 0–0.6)
EOSINOPHILS RELATIVE PERCENT: 2.2 % (ref 0–5)
GFR AFRICAN AMERICAN: >59
GFR NON-AFRICAN AMERICAN: 56
GLUCOSE BLD-MCNC: 97 MG/DL (ref 74–109)
HCT VFR BLD CALC: 25.8 % (ref 37–47)
HEMOGLOBIN: 8.2 G/DL (ref 12–16)
IMMATURE GRANULOCYTES #: 0.1 K/UL
LYMPHOCYTES ABSOLUTE: 2.4 K/UL (ref 1.1–4.5)
LYMPHOCYTES RELATIVE PERCENT: 18.4 % (ref 20–40)
MCH RBC QN AUTO: 29.8 PG (ref 27–31)
MCHC RBC AUTO-ENTMCNC: 31.8 G/DL (ref 33–37)
MCV RBC AUTO: 93.8 FL (ref 81–99)
MONOCYTES ABSOLUTE: 1.3 K/UL (ref 0–0.9)
MONOCYTES RELATIVE PERCENT: 10.1 % (ref 0–10)
NEUTROPHILS ABSOLUTE: 8.8 K/UL (ref 1.5–7.5)
NEUTROPHILS RELATIVE PERCENT: 68.3 % (ref 50–65)
PDW BLD-RTO: 13.8 % (ref 11.5–14.5)
PLATELET # BLD: 553 K/UL (ref 130–400)
PMV BLD AUTO: 9.5 FL (ref 9.4–12.3)
POTASSIUM SERPL-SCNC: 5.3 MMOL/L (ref 3.5–5)
RBC # BLD: 2.75 M/UL (ref 4.2–5.4)
SODIUM BLD-SCNC: 134 MMOL/L (ref 136–145)
WBC # BLD: 12.9 K/UL (ref 4.8–10.8)

## 2021-11-01 PROCEDURE — 80048 BASIC METABOLIC PNL TOTAL CA: CPT

## 2021-11-01 PROCEDURE — 6370000000 HC RX 637 (ALT 250 FOR IP): Performed by: ORTHOPAEDIC SURGERY

## 2021-11-01 PROCEDURE — 6370000000 HC RX 637 (ALT 250 FOR IP): Performed by: HOSPITALIST

## 2021-11-01 PROCEDURE — 6360000002 HC RX W HCPCS: Performed by: ORTHOPAEDIC SURGERY

## 2021-11-01 PROCEDURE — 2580000003 HC RX 258: Performed by: ORTHOPAEDIC SURGERY

## 2021-11-01 PROCEDURE — 97530 THERAPEUTIC ACTIVITIES: CPT

## 2021-11-01 PROCEDURE — 2060000000 HC ICU INTERMEDIATE R&B

## 2021-11-01 PROCEDURE — 6370000000 HC RX 637 (ALT 250 FOR IP): Performed by: STUDENT IN AN ORGANIZED HEALTH CARE EDUCATION/TRAINING PROGRAM

## 2021-11-01 PROCEDURE — 2500000003 HC RX 250 WO HCPCS: Performed by: INTERNAL MEDICINE

## 2021-11-01 PROCEDURE — 97535 SELF CARE MNGMENT TRAINING: CPT

## 2021-11-01 PROCEDURE — 36415 COLL VENOUS BLD VENIPUNCTURE: CPT

## 2021-11-01 PROCEDURE — 85025 COMPLETE CBC W/AUTO DIFF WBC: CPT

## 2021-11-01 RX ORDER — SENNA PLUS 8.6 MG/1
1 TABLET ORAL NIGHTLY
Status: DISCONTINUED | OUTPATIENT
Start: 2021-11-01 | End: 2021-11-06 | Stop reason: HOSPADM

## 2021-11-01 RX ORDER — POLYETHYLENE GLYCOL 3350 17 G/17G
17 POWDER, FOR SOLUTION ORAL DAILY
Status: DISCONTINUED | OUTPATIENT
Start: 2021-11-01 | End: 2021-11-06 | Stop reason: HOSPADM

## 2021-11-01 RX ADMIN — ONDANSETRON 4 MG: 2 INJECTION INTRAMUSCULAR; INTRAVENOUS at 13:13

## 2021-11-01 RX ADMIN — ONDANSETRON 4 MG: 2 INJECTION INTRAMUSCULAR; INTRAVENOUS at 19:54

## 2021-11-01 RX ADMIN — FAMOTIDINE 20 MG: 10 INJECTION, SOLUTION INTRAVENOUS at 19:54

## 2021-11-01 RX ADMIN — SENNOSIDES 8.6 MG: 8.6 TABLET, FILM COATED ORAL at 19:52

## 2021-11-01 RX ADMIN — FERROUS SULFATE TAB 325 MG (65 MG ELEMENTAL FE) 325 MG: 325 (65 FE) TAB at 15:50

## 2021-11-01 RX ADMIN — APIXABAN 5 MG: 5 TABLET, FILM COATED ORAL at 08:17

## 2021-11-01 RX ADMIN — OXYCODONE 10 MG: 5 TABLET ORAL at 15:50

## 2021-11-01 RX ADMIN — FERROUS SULFATE TAB 325 MG (65 MG ELEMENTAL FE) 325 MG: 325 (65 FE) TAB at 08:16

## 2021-11-01 RX ADMIN — OXYCODONE 10 MG: 5 TABLET ORAL at 21:45

## 2021-11-01 RX ADMIN — ATORVASTATIN CALCIUM 20 MG: 20 TABLET, FILM COATED ORAL at 08:16

## 2021-11-01 RX ADMIN — SODIUM CHLORIDE, PRESERVATIVE FREE 10 ML: 5 INJECTION INTRAVENOUS at 19:54

## 2021-11-01 RX ADMIN — OXYCODONE 10 MG: 5 TABLET ORAL at 05:17

## 2021-11-01 RX ADMIN — NALOXEGOL OXALATE 12.5 MG: 12.5 TABLET, FILM COATED ORAL at 08:16

## 2021-11-01 RX ADMIN — APIXABAN 5 MG: 5 TABLET, FILM COATED ORAL at 19:52

## 2021-11-01 RX ADMIN — SODIUM CHLORIDE, PRESERVATIVE FREE 10 ML: 5 INJECTION INTRAVENOUS at 09:00

## 2021-11-01 RX ADMIN — OXYCODONE 10 MG: 5 TABLET ORAL at 11:31

## 2021-11-01 ASSESSMENT — PAIN DESCRIPTION - ORIENTATION
ORIENTATION: RIGHT
ORIENTATION: RIGHT

## 2021-11-01 ASSESSMENT — PAIN DESCRIPTION - LOCATION
LOCATION: ARM;LEG
LOCATION: LEG

## 2021-11-01 ASSESSMENT — PAIN DESCRIPTION - DESCRIPTORS
DESCRIPTORS: ACHING
DESCRIPTORS: ACHING

## 2021-11-01 ASSESSMENT — PAIN SCALES - GENERAL
PAINLEVEL_OUTOF10: 8
PAINLEVEL_OUTOF10: 8
PAINLEVEL_OUTOF10: 7
PAINLEVEL_OUTOF10: 8

## 2021-11-01 ASSESSMENT — PAIN DESCRIPTION - PAIN TYPE
TYPE: ACUTE PAIN;SURGICAL PAIN
TYPE: ACUTE PAIN;SURGICAL PAIN

## 2021-11-01 ASSESSMENT — PAIN DESCRIPTION - ONSET: ONSET: ON-GOING

## 2021-11-01 ASSESSMENT — PAIN DESCRIPTION - FREQUENCY
FREQUENCY: CONTINUOUS
FREQUENCY: CONTINUOUS

## 2021-11-01 ASSESSMENT — PAIN DESCRIPTION - PROGRESSION: CLINICAL_PROGRESSION: NOT CHANGED

## 2021-11-01 NOTE — PROGRESS NOTES
Nutrition Assessment     Type and Reason for Visit: Reassess    Nutrition Recommendations/Plan:   Trial Ensure Clear ONS     Nutrition Assessment:  Pt improving slightly with po intake (recent meals >75%, however most still 25%). Pt requesting different ONS, will trial Ensure Clear. Pt had a bout of emesis during visit- nursing notified. Will modify ONS. Malnutrition Assessment:  Malnutrition Status: Severe malnutrition    Nutrition Related Findings: non-pitting RUE edema      Current Nutrition Therapies:    ADULT ORAL NUTRITION SUPPLEMENT; Lunch, Dinner, Breakfast; Standard High Calorie/High Protein Oral Supplement  ADULT DIET; Regular    Anthropometric Measures:  · Height: 5' 6\" (167.6 cm)  · Current Body Wt: 134 lb (60.8 kg)   · BMI: 21.6    Nutrition Interventions:   Food and/or Nutrient Delivery:  Continue Current Diet, Modify Oral Nutrition Supplement   Coordination of Nutrition Care:  Continue to monitor while inpatient    Goals:  PO 50% or more, wt stable or gain       Nutrition Monitoring and Evaluation:   Food/Nutrient Intake Outcomes:  Food and Nutrient Intake, Supplement Intake  Physical Signs/Symptoms Outcomes:  Biochemical Data, Weight, Skin, Nutrition Focused Physical Findings     Discharge Planning:     Too soon to determine     Electronically signed by Ag Barger MS, RD, LD on 11/1/21 at 2:18 PM CDT    Contact: [phone]

## 2021-11-01 NOTE — PROGRESS NOTES
This nurse went to patient room for medication pass. Patient screamed at this nurse and stated that she \"had been laying in urine for 4 hours and no one has ever bothered to clean her. \" This nurse stated that she was going to pass medications and then get an aid to help clean patient due to patient's decreased mobility. Patient replied \"well Ill just sit here in my own piss. Just what I want to do. Just get the hell out of my room. \" This nurse then found an aid and proceeded back to patient room, where this nurse heard patient talking on phone, complaining that this nurse had \"blantantly ignored her saying that she was wet and that no one listens and no one checks on her\". This nurse and aid proceeded into patient room to clean her. Will continue to monitor.

## 2021-11-01 NOTE — CARE COORDINATION
Pt denied at [facility]. Met with pt and advocate, Mahi Subramanian, at pt's bedside to discuss further placement options. She consented to the following referrals:   [facility]  Phone: [phone]  Fax: [phone] E-fax for admissions  [facility]  [phone]  Electronically signed by Pita Tai on 11/1/2021 at 11:49 AM      [facility] unable to offer bed for pt, awaiting response from other 2 facilities. Electronically signed by Pita Tai on 11/1/2021 at 1:16 PM    Denied placement at [facility]. Awaiting confirmation/denial at [facility].    Electronically signed by Pita Tai on 11/1/2021 at 2:05 PM

## 2021-11-01 NOTE — PROGRESS NOTES
Physical Therapy  Name: Chad Turner  MRN:  [id number]  Date of service:  11/1/2021 11/01/21 1627   Restrictions/Precautions   Restrictions/Precautions Fall Risk;Weight Bearing   Required Braces or Orthoses? Yes   Lower Extremity Weight Bearing Restrictions   Right Lower Extremity Weight Bearing Non Weight Bearing   Upper Extremity Weight Bearing Restrictions   Right Upper Extremity Weight Bearing Non Weight Bearing   Required Braces or Orthoses   Right Lower Extremity Brace Knee Immobilizer   Right Upper Extremity Brace/Splint Sling   General   Chart Reviewed Yes   Subjective   Subjective Pt ready to work with therapy. Pain Screening   Patient Currently in Pain Yes   Pain Assessment   Pain Assessment 0-10   Pain Level 8   Pain Type Acute pain;Surgical pain   Pain Location Arm;Leg   Pain Orientation Right   Pain Descriptors Aching   Functional Pain Assessment Prevents or interferes some active activities and ADLs   Non-Pharmaceutical Pain Intervention(s) Ambulation/Increased Activity;Repositioned; Rest   Response to Pain Intervention Patient Satisfied  (once positioned for comfort)   Pain Frequency Continuous   Bed Mobility   Supine to Sit Minimal assistance   Sit to Supine Minimal assistance   Scooting Contact guard assistance   Comment Pt sat EOB unsupported by therapist with LLE propped   Transfers   Sit to Stand Minimal Assistance;2 Person Assistance   Stand to sit Minimal Assistance;2 Person Assistance   Bed to Chair Contact guard assistance;Minimal assistance;2 Person Assistance   Stand Pivot Transfers Contact guard assistance;Minimal Assistance;2 Person Assistance   Comment stand/pivot bed<>BSC, requires one person to support LLE   Ambulation   Ambulation? No   Short term goals   Time Frame for Short term goals 2 wks   Short term goal 1 supine to sit indep   Short term goal 2 stand pivot bed to chair transfer indep   Short term goal 3 amb.  10' with RUE platform RW SBA   Conditions Requiring Skilled Therapeutic Intervention   Body structures, Functions, Activity limitations Decreased functional mobility ; Decreased ROM; Decreased strength;Decreased cognition;Decreased safe awareness;Decreased balance; Increased pain;Decreased posture   Assessment Worked with pt on bed mobility and sitting balance at EOB with LLE propped, assisted with stand/pivot to/from [facility] and required Leila for pericare following BM. Assisted pt with gown change and oral hygiene. Pt positioned for comfort with all needs in reach following tx.    Activity Tolerance   Activity Tolerance Patient Tolerated treatment well   Safety Devices   Type of devices Bed alarm in place;Call light within reach;Gait belt;Left in bed         Electronically signed by Enrike Jesus PTA on 11/1/2021 at 4:31 PM
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
## 2021-11-01 NOTE — PLAN OF CARE
Nutrition Problem #1: Inadequate protein-energy intake  Intervention: Food and/or Nutrient Delivery: Continue Current Diet, Modify Oral Nutrition Supplement  Nutritional Goals: PO 50% or more, wt stable or gain
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
## 2021-11-02 ENCOUNTER — APPOINTMENT (OUTPATIENT)
Dept: CARDIOLOGY | Facility: HOSPITAL | Age: 61
End: 2021-11-02

## 2021-11-02 LAB
ANION GAP SERPL CALCULATED.3IONS-SCNC: 8 MMOL/L (ref 7–19)
BASOPHILS ABSOLUTE: 0.1 K/UL (ref 0–0.2)
BASOPHILS RELATIVE PERCENT: 0.5 % (ref 0–1)
BUN BLDV-MCNC: 23 MG/DL (ref 8–23)
CALCIUM SERPL-MCNC: 9 MG/DL (ref 8.8–10.2)
CHLORIDE BLD-SCNC: 96 MMOL/L (ref 98–111)
CO2: 30 MMOL/L (ref 22–29)
CREAT SERPL-MCNC: 1 MG/DL (ref 0.5–0.9)
EOSINOPHILS ABSOLUTE: 0.3 K/UL (ref 0–0.6)
EOSINOPHILS RELATIVE PERCENT: 2.1 % (ref 0–5)
GFR AFRICAN AMERICAN: >59
GFR NON-AFRICAN AMERICAN: 56
GLUCOSE BLD-MCNC: 96 MG/DL (ref 74–109)
HCT VFR BLD CALC: 26.9 % (ref 37–47)
HEMOGLOBIN: 8.3 G/DL (ref 12–16)
IMMATURE GRANULOCYTES #: 0.1 K/UL
LYMPHOCYTES ABSOLUTE: 2.8 K/UL (ref 1.1–4.5)
LYMPHOCYTES RELATIVE PERCENT: 17.9 % (ref 20–40)
MCH RBC QN AUTO: 29.4 PG (ref 27–31)
MCHC RBC AUTO-ENTMCNC: 30.9 G/DL (ref 33–37)
MCV RBC AUTO: 95.4 FL (ref 81–99)
MONOCYTES ABSOLUTE: 1.6 K/UL (ref 0–0.9)
MONOCYTES RELATIVE PERCENT: 10.2 % (ref 0–10)
NEUTROPHILS ABSOLUTE: 10.7 K/UL (ref 1.5–7.5)
NEUTROPHILS RELATIVE PERCENT: 69 % (ref 50–65)
PDW BLD-RTO: 13.5 % (ref 11.5–14.5)
PLATELET # BLD: 666 K/UL (ref 130–400)
PMV BLD AUTO: 9.8 FL (ref 9.4–12.3)
POTASSIUM SERPL-SCNC: 4.9 MMOL/L (ref 3.5–5)
POTASSIUM SERPL-SCNC: 6.2 MMOL/L (ref 3.5–5)
RBC # BLD: 2.82 M/UL (ref 4.2–5.4)
SODIUM BLD-SCNC: 134 MMOL/L (ref 136–145)
WBC # BLD: 15.5 K/UL (ref 4.8–10.8)

## 2021-11-02 PROCEDURE — 2580000003 HC RX 258: Performed by: ORTHOPAEDIC SURGERY

## 2021-11-02 PROCEDURE — 6370000000 HC RX 637 (ALT 250 FOR IP): Performed by: STUDENT IN AN ORGANIZED HEALTH CARE EDUCATION/TRAINING PROGRAM

## 2021-11-02 PROCEDURE — 6370000000 HC RX 637 (ALT 250 FOR IP): Performed by: ORTHOPAEDIC SURGERY

## 2021-11-02 PROCEDURE — 84132 ASSAY OF SERUM POTASSIUM: CPT

## 2021-11-02 PROCEDURE — 97530 THERAPEUTIC ACTIVITIES: CPT

## 2021-11-02 PROCEDURE — 6370000000 HC RX 637 (ALT 250 FOR IP): Performed by: INTERNAL MEDICINE

## 2021-11-02 PROCEDURE — 36415 COLL VENOUS BLD VENIPUNCTURE: CPT

## 2021-11-02 PROCEDURE — 85025 COMPLETE CBC W/AUTO DIFF WBC: CPT

## 2021-11-02 PROCEDURE — 6370000000 HC RX 637 (ALT 250 FOR IP): Performed by: HOSPITALIST

## 2021-11-02 PROCEDURE — 80048 BASIC METABOLIC PNL TOTAL CA: CPT

## 2021-11-02 PROCEDURE — 2500000003 HC RX 250 WO HCPCS: Performed by: INTERNAL MEDICINE

## 2021-11-02 PROCEDURE — 2060000000 HC ICU INTERMEDIATE R&B

## 2021-11-02 RX ORDER — LIDOCAINE HYDROCHLORIDE 10 MG/ML
5 INJECTION, SOLUTION EPIDURAL; INFILTRATION; INTRACAUDAL; PERINEURAL ONCE
Status: DISCONTINUED | OUTPATIENT
Start: 2021-11-02 | End: 2021-11-04 | Stop reason: ALTCHOICE

## 2021-11-02 RX ORDER — DEXTROSE MONOHYDRATE 25 G/50ML
12.5 INJECTION, SOLUTION INTRAVENOUS PRN
Status: DISCONTINUED | OUTPATIENT
Start: 2021-11-02 | End: 2021-11-06 | Stop reason: HOSPADM

## 2021-11-02 RX ORDER — SODIUM CHLORIDE 9 MG/ML
INJECTION, SOLUTION INTRAVENOUS CONTINUOUS
Status: ACTIVE | OUTPATIENT
Start: 2021-11-02 | End: 2021-11-03

## 2021-11-02 RX ORDER — SODIUM CHLORIDE 9 MG/ML
25 INJECTION, SOLUTION INTRAVENOUS PRN
Status: DISCONTINUED | OUTPATIENT
Start: 2021-11-02 | End: 2021-11-06 | Stop reason: HOSPADM

## 2021-11-02 RX ORDER — DEXTROSE MONOHYDRATE 50 MG/ML
100 INJECTION, SOLUTION INTRAVENOUS PRN
Status: DISCONTINUED | OUTPATIENT
Start: 2021-11-02 | End: 2021-11-06 | Stop reason: HOSPADM

## 2021-11-02 RX ORDER — SODIUM CHLORIDE 0.9 % (FLUSH) 0.9 %
5-40 SYRINGE (ML) INJECTION EVERY 12 HOURS SCHEDULED
Status: DISCONTINUED | OUTPATIENT
Start: 2021-11-02 | End: 2021-11-06 | Stop reason: HOSPADM

## 2021-11-02 RX ORDER — DEXTROSE MONOHYDRATE 25 G/50ML
25 INJECTION, SOLUTION INTRAVENOUS ONCE
Status: DISCONTINUED | OUTPATIENT
Start: 2021-11-02 | End: 2021-11-06 | Stop reason: HOSPADM

## 2021-11-02 RX ORDER — SODIUM CHLORIDE 0.9 % (FLUSH) 0.9 %
5-40 SYRINGE (ML) INJECTION PRN
Status: DISCONTINUED | OUTPATIENT
Start: 2021-11-02 | End: 2021-11-06 | Stop reason: HOSPADM

## 2021-11-02 RX ORDER — NICOTINE POLACRILEX 4 MG
15 LOZENGE BUCCAL PRN
Status: DISCONTINUED | OUTPATIENT
Start: 2021-11-02 | End: 2021-11-06 | Stop reason: HOSPADM

## 2021-11-02 RX ADMIN — SODIUM ZIRCONIUM CYCLOSILICATE 5 G: 10 POWDER, FOR SUSPENSION ORAL at 21:01

## 2021-11-02 RX ADMIN — SODIUM ZIRCONIUM CYCLOSILICATE 5 G: 10 POWDER, FOR SUSPENSION ORAL at 15:23

## 2021-11-02 RX ADMIN — METOPROLOL TARTRATE 25 MG: 25 TABLET, FILM COATED ORAL at 21:01

## 2021-11-02 RX ADMIN — APIXABAN 5 MG: 5 TABLET, FILM COATED ORAL at 08:34

## 2021-11-02 RX ADMIN — SODIUM CHLORIDE, PRESERVATIVE FREE 10 ML: 5 INJECTION INTRAVENOUS at 08:35

## 2021-11-02 RX ADMIN — ONDANSETRON 4 MG: 4 TABLET, ORALLY DISINTEGRATING ORAL at 14:31

## 2021-11-02 RX ADMIN — SODIUM ZIRCONIUM CYCLOSILICATE 10 G: 10 POWDER, FOR SUSPENSION ORAL at 04:59

## 2021-11-02 RX ADMIN — POLYETHYLENE GLYCOL 3350 17 G: 17 POWDER, FOR SOLUTION ORAL at 08:34

## 2021-11-02 RX ADMIN — FERROUS SULFATE TAB 325 MG (65 MG ELEMENTAL FE) 325 MG: 325 (65 FE) TAB at 18:28

## 2021-11-02 RX ADMIN — SENNOSIDES 8.6 MG: 8.6 TABLET, FILM COATED ORAL at 21:01

## 2021-11-02 RX ADMIN — OXYCODONE 10 MG: 5 TABLET ORAL at 21:01

## 2021-11-02 RX ADMIN — ATORVASTATIN CALCIUM 20 MG: 20 TABLET, FILM COATED ORAL at 08:34

## 2021-11-02 RX ADMIN — FERROUS SULFATE TAB 325 MG (65 MG ELEMENTAL FE) 325 MG: 325 (65 FE) TAB at 08:34

## 2021-11-02 RX ADMIN — OXYCODONE 10 MG: 5 TABLET ORAL at 15:23

## 2021-11-02 RX ADMIN — OXYCODONE 10 MG: 5 TABLET ORAL at 05:13

## 2021-11-02 RX ADMIN — OXYCODONE 10 MG: 5 TABLET ORAL at 10:01

## 2021-11-02 RX ADMIN — APIXABAN 5 MG: 5 TABLET, FILM COATED ORAL at 21:01

## 2021-11-02 ASSESSMENT — PAIN DESCRIPTION - LOCATION
LOCATION: LEG
LOCATION: LEG;ARM

## 2021-11-02 ASSESSMENT — PAIN DESCRIPTION - DESCRIPTORS
DESCRIPTORS: ACHING
DESCRIPTORS: ACHING
DESCRIPTORS: ACHING;DISCOMFORT
DESCRIPTORS: ACHING

## 2021-11-02 ASSESSMENT — PAIN DESCRIPTION - ORIENTATION
ORIENTATION: RIGHT

## 2021-11-02 ASSESSMENT — PAIN DESCRIPTION - FREQUENCY
FREQUENCY: CONTINUOUS

## 2021-11-02 ASSESSMENT — PAIN DESCRIPTION - PAIN TYPE
TYPE: ACUTE PAIN;SURGICAL PAIN

## 2021-11-02 ASSESSMENT — PAIN SCALES - GENERAL
PAINLEVEL_OUTOF10: 7
PAINLEVEL_OUTOF10: 8
PAINLEVEL_OUTOF10: 10
PAINLEVEL_OUTOF10: 8
PAINLEVEL_OUTOF10: 8
PAINLEVEL_OUTOF10: 10
PAINLEVEL_OUTOF10: 8
PAINLEVEL_OUTOF10: 8

## 2021-11-02 ASSESSMENT — PAIN DESCRIPTION - PROGRESSION
CLINICAL_PROGRESSION: NOT CHANGED
CLINICAL_PROGRESSION: NOT CHANGED

## 2021-11-02 ASSESSMENT — PAIN - FUNCTIONAL ASSESSMENT
PAIN_FUNCTIONAL_ASSESSMENT: PREVENTS OR INTERFERES SOME ACTIVE ACTIVITIES AND ADLS

## 2021-11-02 ASSESSMENT — PAIN DESCRIPTION - ONSET: ONSET: ON-GOING
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
## 2021-11-02 NOTE — PROGRESS NOTES
Recent Labs     11/01/21  0258 11/02/21  0327   BUN 22 23       Recent Labs     11/01/21  0258 11/02/21  0327   CREATININE 1.0* 1.0*       Estimated Creatinine Clearance: 54 mL/min (A) (based on SCr of 1 mg/dL (H)).       Plan: Change Pepcid IV to 20 mg BID    Electronically signed by Suraj Trivedi, [address] [place] [address] on 11/2/2021 at 5:10 AM

## 2021-11-02 NOTE — PROGRESS NOTES
progressing as tolerated; will need continued therapy to improve functional status. Prognosis Good   REQUIRES OT FOLLOW UP Yes   Treatment Initiated  Tx focused on functional mobility and t/f from recliner, back to bed. Discharge Recommendations Patient would benefit from continued therapy after discharge   Activity Tolerance   Activity Tolerance Patient Tolerated treatment well   Safety Devices   Safety Devices in place Yes   Type of devices Bed alarm in place;Call light within reach;Nurse notified   Plan   Times per week 3-5   Current Treatment Recommendations Strengthening;Positioning;Pain Management; Wheelchair Mobility Training;ROM;Safety Education & Training;Balance Training;Patient/Caregiver Education & Training;Self-Care / ADL; Functional Mobility Training;Equipment Evaluation, Education, & procurement;Home Management Training; Endurance Training   Electronically signed by JOVANY Lu on 11/2/2021 at 2:19 PM
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
## 2021-11-02 NOTE — PROGRESS NOTES
Physical Therapy  Name: Juventino Villanueva  MRN:  [id number]  Date of service:  11/2/2021 11/02/21 1033   Restrictions/Precautions   Restrictions/Precautions Fall Risk;Weight Bearing   Required Braces or Orthoses? Yes   Lower Extremity Weight Bearing Restrictions   Right Lower Extremity Weight Bearing Non Weight Bearing   Upper Extremity Weight Bearing Restrictions   Right Upper Extremity Weight Bearing Non Weight Bearing   Required Braces or Orthoses   Right Lower Extremity Brace Knee Immobilizer   Right Upper Extremity Brace/Splint Sling   General   Chart Reviewed Yes   Subjective   Subjective Pt ready to work with therapy, very pleasant and cooperative. Pain Screening   Patient Currently in Pain Yes   Pain Assessment   Pain Assessment 0-10   Pain Level 8   Pain Type Acute pain;Surgical pain   Pain Location Leg   Pain Orientation Right   Pain Descriptors Aching   Functional Pain Assessment Prevents or interferes some active activities and ADLs   Non-Pharmaceutical Pain Intervention(s) Ambulation/Increased Activity;Repositioned; Rest   Response to Pain Intervention Patient Satisfied   Pain Frequency Continuous   Bed Mobility   Supine to Sit Contact guard assistance   Scooting Stand by assistance  (to EOB)   Transfers   Sit to Stand Contact guard assistance   Stand to sit Contact guard assistance   Bed to Chair Contact guard assistance   Stand Pivot Transfers Contact guard assistance   Comment stand/pivot bed-chair   Ambulation   Ambulation? No   Short term goals   Time Frame for Short term goals 2 wks   Short term goal 1 supine to sit indep   Short term goal 2 stand pivot bed to chair transfer indep   Short term goal 3 amb. 10' with [employer] platform  SBA   Conditions Requiring Skilled Therapeutic Intervention   Body structures, Functions, Activity limitations Decreased functional mobility ; Decreased ROM; Decreased strength;Decreased cognition;Decreased safe awareness;Decreased balance; Increased pain;Decreased posture Assessment Pt progressing well with therapy, able to stand from bedside and pivot to recliner with CGA. Pt positioned for comfort with all needs in reach.    Activity Tolerance   Activity Tolerance Patient Tolerated treatment well   Safety Devices   Type of devices Call light within reach;Gait belt;Left in chair         Electronically signed by Jaimie Thapa PTA on 11/2/2021 at 10:36 AM

## 2021-11-02 NOTE — FLOWSHEET NOTE
11/02/21 1545   Encounter Summary   Services provided to: Patient   Referral/Consult From: Nursing Supervisor/Manager; Other    Complexity of Encounter High   Length of Encounter 15 minutes   Spiritual/Adventist   Type Spiritual struggle   Assessment Approachable;Tearful; Anxious   Intervention Active listening;Explored feelings, thoughts, concerns;Prayer   Outcome Expressed gratitude     Received referral from [place] [facility] [place], [address] Upon entering patient's room she said \"you're not her\", referring to the on call  she talked with another night. Pt said \"I'm okay. \" Continue support at point of need.    Electronically signed by Mayelin Orellana on 11/2/2021 at 4:46 PM

## 2021-11-02 NOTE — CARE COORDINATION
ROOPA left message with José Luis RON [facility] to call back to discuss options under [facility].   Electronically signed by Génesis Gonzalez RN on 11/2/2021 at 6:11 PM
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
## 2021-11-02 NOTE — PROGRESS NOTES
Physical Therapy  Name: Brad Ferrera  MRN:  [id number]  Date of service:  11/2/2021 11/02/21 1408   Restrictions/Precautions   Restrictions/Precautions Fall Risk;Weight Bearing   Required Braces or Orthoses? Yes   Lower Extremity Weight Bearing Restrictions   Right Lower Extremity Weight Bearing Non Weight Bearing   Upper Extremity Weight Bearing Restrictions   Right Upper Extremity Weight Bearing Non Weight Bearing   Required Braces or Orthoses   Right Lower Extremity Brace Knee Immobilizer   Right Upper Extremity Brace/Splint Sling   General   Chart Reviewed Yes   Subjective   Subjective Pt ready to get BTB. Pain Screening   Patient Currently in Pain Yes   Pain Assessment   Pain Assessment 0-10   Pain Level 8  (RN notified of request for pain meds)   Pain Type Acute pain;Surgical pain   Pain Location Leg   Pain Orientation Right   Pain Descriptors Aching   Functional Pain Assessment Prevents or interferes some active activities and ADLs   Non-Pharmaceutical Pain Intervention(s) Repositioned; Rest   Response to Pain Intervention Patient Satisfied   Pain Frequency Continuous   Bed Mobility   Sit to Supine Minimal assistance   Transfers   Sit to Stand Minimal Assistance   Stand to sit Minimal Assistance   Bed to Chair Minimal assistance   Stand Pivot Transfers Minimal Assistance   Comment stand/pivot BTB   Ambulation   Ambulation? No   Short term goals   Time Frame for Short term goals 2 wks   Short term goal 1 supine to sit indep   Short term goal 2 stand pivot bed to chair transfer indep   Short term goal 3 amb. 10' with [email or web address] platform RW SBA   Conditions Requiring Skilled Therapeutic Intervention   Body structures, Functions, Activity limitations Decreased functional mobility ; Decreased ROM; Decreased strength;Decreased cognition;Decreased safe awareness;Decreased balance; Increased pain;Decreased posture   Assessment Pt requiring slightly more assistance with pivot BTB d/t increased nausea and weakness, but cont to do well with therapy considering. Pt returned to supine and positioned for comfort with all needs in reach.    Activity Tolerance   Activity Tolerance Patient Tolerated treatment well   Safety Devices   Type of devices Bed alarm in place;Call light within reach;Gait belt;Left in bed         Electronically signed by Ernestina Ramsay PTA on 11/2/2021 at 2:22 PM

## 2021-11-03 LAB
ANION GAP SERPL CALCULATED.3IONS-SCNC: 14 MMOL/L (ref 7–19)
BASOPHILS ABSOLUTE: 0.1 K/UL (ref 0–0.2)
BASOPHILS RELATIVE PERCENT: 0.5 % (ref 0–1)
BUN BLDV-MCNC: 24 MG/DL (ref 8–23)
CALCIUM SERPL-MCNC: 8.8 MG/DL (ref 8.8–10.2)
CHLORIDE BLD-SCNC: 95 MMOL/L (ref 98–111)
CO2: 25 MMOL/L (ref 22–29)
CREAT SERPL-MCNC: 0.9 MG/DL (ref 0.5–0.9)
EOSINOPHILS ABSOLUTE: 0.1 K/UL (ref 0–0.6)
EOSINOPHILS RELATIVE PERCENT: 0.8 % (ref 0–5)
GFR AFRICAN AMERICAN: >59
GFR NON-AFRICAN AMERICAN: >60
GLUCOSE BLD-MCNC: 93 MG/DL (ref 74–109)
HCT VFR BLD CALC: 27.8 % (ref 37–47)
HEMOGLOBIN: 8.9 G/DL (ref 12–16)
IMMATURE GRANULOCYTES #: 0.1 K/UL
LYMPHOCYTES ABSOLUTE: 2.2 K/UL (ref 1.1–4.5)
LYMPHOCYTES RELATIVE PERCENT: 13.2 % (ref 20–40)
MCH RBC QN AUTO: 29.7 PG (ref 27–31)
MCHC RBC AUTO-ENTMCNC: 32 G/DL (ref 33–37)
MCV RBC AUTO: 92.7 FL (ref 81–99)
MONOCYTES ABSOLUTE: 1.3 K/UL (ref 0–0.9)
MONOCYTES RELATIVE PERCENT: 7.6 % (ref 0–10)
NEUTROPHILS ABSOLUTE: 13 K/UL (ref 1.5–7.5)
NEUTROPHILS RELATIVE PERCENT: 77.5 % (ref 50–65)
PDW BLD-RTO: 13.4 % (ref 11.5–14.5)
PLATELET # BLD: 827 K/UL (ref 130–400)
PMV BLD AUTO: 10.1 FL (ref 9.4–12.3)
POTASSIUM SERPL-SCNC: 5.1 MMOL/L (ref 3.5–5)
RBC # BLD: 3 M/UL (ref 4.2–5.4)
SODIUM BLD-SCNC: 134 MMOL/L (ref 136–145)
WBC # BLD: 16.7 K/UL (ref 4.8–10.8)

## 2021-11-03 PROCEDURE — 2060000000 HC ICU INTERMEDIATE R&B

## 2021-11-03 PROCEDURE — 36415 COLL VENOUS BLD VENIPUNCTURE: CPT

## 2021-11-03 PROCEDURE — 6370000000 HC RX 637 (ALT 250 FOR IP): Performed by: ORTHOPAEDIC SURGERY

## 2021-11-03 PROCEDURE — 80048 BASIC METABOLIC PNL TOTAL CA: CPT

## 2021-11-03 PROCEDURE — 97535 SELF CARE MNGMENT TRAINING: CPT

## 2021-11-03 PROCEDURE — 85025 COMPLETE CBC W/AUTO DIFF WBC: CPT

## 2021-11-03 PROCEDURE — 6370000000 HC RX 637 (ALT 250 FOR IP): Performed by: STUDENT IN AN ORGANIZED HEALTH CARE EDUCATION/TRAINING PROGRAM

## 2021-11-03 PROCEDURE — 6370000000 HC RX 637 (ALT 250 FOR IP): Performed by: HOSPITALIST

## 2021-11-03 PROCEDURE — 2500000003 HC RX 250 WO HCPCS: Performed by: STUDENT IN AN ORGANIZED HEALTH CARE EDUCATION/TRAINING PROGRAM

## 2021-11-03 RX ORDER — MORPHINE SULFATE 2 MG/ML
1 INJECTION, SOLUTION INTRAMUSCULAR; INTRAVENOUS ONCE
Status: COMPLETED | OUTPATIENT
Start: 2021-11-03 | End: 2021-11-03

## 2021-11-03 RX ADMIN — ATORVASTATIN CALCIUM 20 MG: 20 TABLET, FILM COATED ORAL at 09:36

## 2021-11-03 RX ADMIN — MORPHINE SULFATE 1 MG: 2 INJECTION, SOLUTION INTRAMUSCULAR; INTRAVENOUS at 10:55

## 2021-11-03 RX ADMIN — FERROUS SULFATE TAB 325 MG (65 MG ELEMENTAL FE) 325 MG: 325 (65 FE) TAB at 09:36

## 2021-11-03 RX ADMIN — SENNOSIDES 8.6 MG: 8.6 TABLET, FILM COATED ORAL at 20:12

## 2021-11-03 RX ADMIN — OXYCODONE 10 MG: 5 TABLET ORAL at 06:01

## 2021-11-03 RX ADMIN — ONDANSETRON 4 MG: 4 TABLET, ORALLY DISINTEGRATING ORAL at 09:08

## 2021-11-03 RX ADMIN — APIXABAN 5 MG: 5 TABLET, FILM COATED ORAL at 20:16

## 2021-11-03 RX ADMIN — APIXABAN 5 MG: 5 TABLET, FILM COATED ORAL at 09:36

## 2021-11-03 RX ADMIN — OXYCODONE 10 MG: 5 TABLET ORAL at 20:57

## 2021-11-03 RX ADMIN — ONDANSETRON 4 MG: 4 TABLET, ORALLY DISINTEGRATING ORAL at 18:13

## 2021-11-03 RX ADMIN — METOPROLOL TARTRATE 25 MG: 25 TABLET, FILM COATED ORAL at 09:36

## 2021-11-03 RX ADMIN — METOPROLOL TARTRATE 25 MG: 25 TABLET, FILM COATED ORAL at 20:12

## 2021-11-03 RX ADMIN — FERROUS SULFATE TAB 325 MG (65 MG ELEMENTAL FE) 325 MG: 325 (65 FE) TAB at 16:36

## 2021-11-03 ASSESSMENT — PAIN SCALES - GENERAL
PAINLEVEL_OUTOF10: 10
PAINLEVEL_OUTOF10: 0
PAINLEVEL_OUTOF10: 10
PAINLEVEL_OUTOF10: 10

## 2021-11-03 ASSESSMENT — PAIN DESCRIPTION - LOCATION: LOCATION: LEG

## 2021-11-03 ASSESSMENT — PAIN DESCRIPTION - ORIENTATION: ORIENTATION: RIGHT

## 2021-11-03 ASSESSMENT — PAIN DESCRIPTION - DESCRIPTORS: DESCRIPTORS: ACHING

## 2021-11-03 ASSESSMENT — PAIN DESCRIPTION - PAIN TYPE: TYPE: ACUTE PAIN
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
## 2021-11-03 NOTE — PROGRESS NOTES
Met with pt as requested and provided pt with coping skill and grief/loss resources. Left handouts on pt's bedside table. Pt reported she was tired today and did not feel good. Informed nursing staff.       Electronically signed by Ammon Roldan, [address] on 11/3/2021 at 10:04 AM
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
## 2021-11-03 NOTE — PROGRESS NOTES
Patient very nauseous in bed. Patient requested ice chips and asked that we wait on her pills until later due to nausea.    Electronically signed by Анна Cope RN on 11/3/2021 at 8:55 AM
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
## 2021-11-04 LAB
ANION GAP SERPL CALCULATED.3IONS-SCNC: 12 MMOL/L (ref 7–19)
BASOPHILS ABSOLUTE: 0.1 K/UL (ref 0–0.2)
BASOPHILS RELATIVE PERCENT: 0.6 % (ref 0–1)
BUN BLDV-MCNC: 31 MG/DL (ref 8–23)
CALCIUM SERPL-MCNC: 9.1 MG/DL (ref 8.8–10.2)
CHLORIDE BLD-SCNC: 94 MMOL/L (ref 98–111)
CO2: 28 MMOL/L (ref 22–29)
CREAT SERPL-MCNC: 1 MG/DL (ref 0.5–0.9)
EOSINOPHILS ABSOLUTE: 0.3 K/UL (ref 0–0.6)
EOSINOPHILS RELATIVE PERCENT: 1.8 % (ref 0–5)
GFR AFRICAN AMERICAN: >59
GFR NON-AFRICAN AMERICAN: 56
GLUCOSE BLD-MCNC: 132 MG/DL (ref 70–99)
GLUCOSE BLD-MCNC: 141 MG/DL (ref 70–99)
GLUCOSE BLD-MCNC: 94 MG/DL (ref 74–109)
GLUCOSE BLD-MCNC: 98 MG/DL (ref 70–99)
HCT VFR BLD CALC: 28.4 % (ref 37–47)
HEMOGLOBIN: 9 G/DL (ref 12–16)
IMMATURE GRANULOCYTES #: 0.1 K/UL
LYMPHOCYTES ABSOLUTE: 2.8 K/UL (ref 1.1–4.5)
LYMPHOCYTES RELATIVE PERCENT: 18 % (ref 20–40)
MCH RBC QN AUTO: 29.3 PG (ref 27–31)
MCHC RBC AUTO-ENTMCNC: 31.7 G/DL (ref 33–37)
MCV RBC AUTO: 92.5 FL (ref 81–99)
MONOCYTES ABSOLUTE: 1.4 K/UL (ref 0–0.9)
MONOCYTES RELATIVE PERCENT: 8.9 % (ref 0–10)
NEUTROPHILS ABSOLUTE: 11 K/UL (ref 1.5–7.5)
NEUTROPHILS RELATIVE PERCENT: 70.2 % (ref 50–65)
PDW BLD-RTO: 13.4 % (ref 11.5–14.5)
PERFORMED ON: ABNORMAL
PERFORMED ON: ABNORMAL
PERFORMED ON: NORMAL
PLATELET # BLD: 923 K/UL (ref 130–400)
PMV BLD AUTO: 9.9 FL (ref 9.4–12.3)
POTASSIUM SERPL-SCNC: 4.6 MMOL/L (ref 3.5–5)
RBC # BLD: 3.07 M/UL (ref 4.2–5.4)
SODIUM BLD-SCNC: 134 MMOL/L (ref 136–145)
WBC # BLD: 15.7 K/UL (ref 4.8–10.8)

## 2021-11-04 PROCEDURE — 6370000000 HC RX 637 (ALT 250 FOR IP): Performed by: ORTHOPAEDIC SURGERY

## 2021-11-04 PROCEDURE — 2580000003 HC RX 258: Performed by: STUDENT IN AN ORGANIZED HEALTH CARE EDUCATION/TRAINING PROGRAM

## 2021-11-04 PROCEDURE — 80048 BASIC METABOLIC PNL TOTAL CA: CPT

## 2021-11-04 PROCEDURE — 82947 ASSAY GLUCOSE BLOOD QUANT: CPT

## 2021-11-04 PROCEDURE — 36415 COLL VENOUS BLD VENIPUNCTURE: CPT

## 2021-11-04 PROCEDURE — 6360000002 HC RX W HCPCS: Performed by: ORTHOPAEDIC SURGERY

## 2021-11-04 PROCEDURE — 85025 COMPLETE CBC W/AUTO DIFF WBC: CPT

## 2021-11-04 PROCEDURE — 2500000003 HC RX 250 WO HCPCS: Performed by: INTERNAL MEDICINE

## 2021-11-04 PROCEDURE — 6370000000 HC RX 637 (ALT 250 FOR IP): Performed by: STUDENT IN AN ORGANIZED HEALTH CARE EDUCATION/TRAINING PROGRAM

## 2021-11-04 PROCEDURE — 6370000000 HC RX 637 (ALT 250 FOR IP): Performed by: HOSPITALIST

## 2021-11-04 PROCEDURE — 2060000000 HC ICU INTERMEDIATE R&B

## 2021-11-04 RX ADMIN — ONDANSETRON 4 MG: 4 TABLET, ORALLY DISINTEGRATING ORAL at 06:16

## 2021-11-04 RX ADMIN — FAMOTIDINE 20 MG: 10 INJECTION, SOLUTION INTRAVENOUS at 22:37

## 2021-11-04 RX ADMIN — OXYCODONE 10 MG: 5 TABLET ORAL at 22:38

## 2021-11-04 RX ADMIN — SODIUM CHLORIDE, PRESERVATIVE FREE 10 ML: 5 INJECTION INTRAVENOUS at 22:37

## 2021-11-04 RX ADMIN — ONDANSETRON 4 MG: 2 INJECTION INTRAMUSCULAR; INTRAVENOUS at 22:37

## 2021-11-04 RX ADMIN — ONDANSETRON 4 MG: 2 INJECTION INTRAMUSCULAR; INTRAVENOUS at 15:43

## 2021-11-04 RX ADMIN — ATORVASTATIN CALCIUM 20 MG: 20 TABLET, FILM COATED ORAL at 09:58

## 2021-11-04 RX ADMIN — OXYCODONE 10 MG: 5 TABLET ORAL at 15:39

## 2021-11-04 RX ADMIN — APIXABAN 5 MG: 5 TABLET, FILM COATED ORAL at 09:58

## 2021-11-04 RX ADMIN — METOPROLOL TARTRATE 25 MG: 25 TABLET, FILM COATED ORAL at 09:58

## 2021-11-04 RX ADMIN — OXYCODONE 10 MG: 5 TABLET ORAL at 06:17

## 2021-11-04 RX ADMIN — METOPROLOL TARTRATE 25 MG: 25 TABLET, FILM COATED ORAL at 22:37

## 2021-11-04 RX ADMIN — APIXABAN 5 MG: 5 TABLET, FILM COATED ORAL at 22:38

## 2021-11-04 RX ADMIN — FERROUS SULFATE TAB 325 MG (65 MG ELEMENTAL FE) 325 MG: 325 (65 FE) TAB at 10:00

## 2021-11-04 RX ADMIN — SENNOSIDES 8.6 MG: 8.6 TABLET, FILM COATED ORAL at 22:38

## 2021-11-04 RX ADMIN — POLYETHYLENE GLYCOL 3350 17 G: 17 POWDER, FOR SOLUTION ORAL at 09:58

## 2021-11-04 ASSESSMENT — PAIN SCALES - GENERAL
PAINLEVEL_OUTOF10: 8
PAINLEVEL_OUTOF10: 0
PAINLEVEL_OUTOF10: 10
PAINLEVEL_OUTOF10: 9

## 2021-11-04 NOTE — CONSULTS
[facility] Vascular Access Team:  Consult Note    Ludmila Ortiz  Admitted - 10/23/2021  1:41 AM  Admission Diagnosis -   Closed subcapital fracture of femur with malunion, right [S72.011P]    Attending Physician - Jackson Tomas MD  Ordering Physician - Jackson Tomas MD    Reason for consult:  [facility] VAT team consulted for placement of Ultrasound Guided Peripheral IV. Indications:    Patient has no access. Cast to right arm and has had multiple nurses attempt access. Findings:  20G Ultrasound Guide Peripheral IV placed in left basilic vein with one attempt. No complications. Refer to flow sheet documentation. Recommendations:  Ultrasound-Guided Peripheral IV is ready to be used    Thank you for your time and consult to the [facility] VAT.      Electronically Signed By: Chris Ellis RN on 11/4/2021 at 11:00 AM
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
## 2021-11-05 LAB
ANION GAP SERPL CALCULATED.3IONS-SCNC: 11 MMOL/L (ref 7–19)
BASOPHILS ABSOLUTE: 0.1 K/UL (ref 0–0.2)
BASOPHILS RELATIVE PERCENT: 0.6 % (ref 0–1)
BUN BLDV-MCNC: 31 MG/DL (ref 8–23)
CALCIUM SERPL-MCNC: 9 MG/DL (ref 8.8–10.2)
CHLORIDE BLD-SCNC: 94 MMOL/L (ref 98–111)
CO2: 27 MMOL/L (ref 22–29)
CREAT SERPL-MCNC: 0.8 MG/DL (ref 0.5–0.9)
EOSINOPHILS ABSOLUTE: 0.2 K/UL (ref 0–0.6)
EOSINOPHILS RELATIVE PERCENT: 1.4 % (ref 0–5)
GFR AFRICAN AMERICAN: >59
GFR NON-AFRICAN AMERICAN: >60
GLUCOSE BLD-MCNC: 101 MG/DL (ref 74–109)
GLUCOSE BLD-MCNC: 120 MG/DL (ref 70–99)
HCT VFR BLD CALC: 26.8 % (ref 37–47)
HEMOGLOBIN: 8.6 G/DL (ref 12–16)
IMMATURE GRANULOCYTES #: 0.1 K/UL
LYMPHOCYTES ABSOLUTE: 2.3 K/UL (ref 1.1–4.5)
LYMPHOCYTES RELATIVE PERCENT: 14.9 % (ref 20–40)
MCH RBC QN AUTO: 29.7 PG (ref 27–31)
MCHC RBC AUTO-ENTMCNC: 32.1 G/DL (ref 33–37)
MCV RBC AUTO: 92.4 FL (ref 81–99)
MONOCYTES ABSOLUTE: 1.1 K/UL (ref 0–0.9)
MONOCYTES RELATIVE PERCENT: 7 % (ref 0–10)
NEUTROPHILS ABSOLUTE: 11.7 K/UL (ref 1.5–7.5)
NEUTROPHILS RELATIVE PERCENT: 75.6 % (ref 50–65)
PDW BLD-RTO: 13.2 % (ref 11.5–14.5)
PERFORMED ON: ABNORMAL
PLATELET # BLD: 964 K/UL (ref 130–400)
PMV BLD AUTO: 9.7 FL (ref 9.4–12.3)
POTASSIUM SERPL-SCNC: 4.5 MMOL/L (ref 3.5–5)
RBC # BLD: 2.9 M/UL (ref 4.2–5.4)
SODIUM BLD-SCNC: 132 MMOL/L (ref 136–145)
WBC # BLD: 15.4 K/UL (ref 4.8–10.8)

## 2021-11-05 PROCEDURE — 82947 ASSAY GLUCOSE BLOOD QUANT: CPT

## 2021-11-05 PROCEDURE — 80048 BASIC METABOLIC PNL TOTAL CA: CPT

## 2021-11-05 PROCEDURE — 6360000002 HC RX W HCPCS: Performed by: ORTHOPAEDIC SURGERY

## 2021-11-05 PROCEDURE — 97535 SELF CARE MNGMENT TRAINING: CPT

## 2021-11-05 PROCEDURE — 97530 THERAPEUTIC ACTIVITIES: CPT

## 2021-11-05 PROCEDURE — 6370000000 HC RX 637 (ALT 250 FOR IP): Performed by: HOSPITALIST

## 2021-11-05 PROCEDURE — 6370000000 HC RX 637 (ALT 250 FOR IP): Performed by: ORTHOPAEDIC SURGERY

## 2021-11-05 PROCEDURE — 36415 COLL VENOUS BLD VENIPUNCTURE: CPT

## 2021-11-05 PROCEDURE — 2500000003 HC RX 250 WO HCPCS: Performed by: INTERNAL MEDICINE

## 2021-11-05 PROCEDURE — 2060000000 HC ICU INTERMEDIATE R&B

## 2021-11-05 PROCEDURE — 85025 COMPLETE CBC W/AUTO DIFF WBC: CPT

## 2021-11-05 PROCEDURE — 2580000003 HC RX 258: Performed by: STUDENT IN AN ORGANIZED HEALTH CARE EDUCATION/TRAINING PROGRAM

## 2021-11-05 PROCEDURE — 6370000000 HC RX 637 (ALT 250 FOR IP): Performed by: STUDENT IN AN ORGANIZED HEALTH CARE EDUCATION/TRAINING PROGRAM

## 2021-11-05 RX ADMIN — SODIUM CHLORIDE, PRESERVATIVE FREE 10 ML: 5 INJECTION INTRAVENOUS at 20:35

## 2021-11-05 RX ADMIN — POLYETHYLENE GLYCOL 3350 17 G: 17 POWDER, FOR SOLUTION ORAL at 08:30

## 2021-11-05 RX ADMIN — APIXABAN 5 MG: 5 TABLET, FILM COATED ORAL at 08:30

## 2021-11-05 RX ADMIN — METOPROLOL TARTRATE 25 MG: 25 TABLET, FILM COATED ORAL at 20:35

## 2021-11-05 RX ADMIN — ONDANSETRON 4 MG: 2 INJECTION INTRAMUSCULAR; INTRAVENOUS at 21:31

## 2021-11-05 RX ADMIN — FERROUS SULFATE TAB 325 MG (65 MG ELEMENTAL FE) 325 MG: 325 (65 FE) TAB at 16:35

## 2021-11-05 RX ADMIN — APIXABAN 5 MG: 5 TABLET, FILM COATED ORAL at 20:35

## 2021-11-05 RX ADMIN — SENNOSIDES 8.6 MG: 8.6 TABLET, FILM COATED ORAL at 20:35

## 2021-11-05 RX ADMIN — OXYCODONE 10 MG: 5 TABLET ORAL at 16:36

## 2021-11-05 RX ADMIN — ONDANSETRON 4 MG: 2 INJECTION INTRAMUSCULAR; INTRAVENOUS at 16:37

## 2021-11-05 RX ADMIN — OXYCODONE 5 MG: 5 TABLET ORAL at 21:32

## 2021-11-05 RX ADMIN — FAMOTIDINE 20 MG: 10 INJECTION, SOLUTION INTRAVENOUS at 20:35

## 2021-11-05 RX ADMIN — ATORVASTATIN CALCIUM 20 MG: 20 TABLET, FILM COATED ORAL at 08:30

## 2021-11-05 RX ADMIN — METOPROLOL TARTRATE 25 MG: 25 TABLET, FILM COATED ORAL at 08:30

## 2021-11-05 RX ADMIN — FERROUS SULFATE TAB 325 MG (65 MG ELEMENTAL FE) 325 MG: 325 (65 FE) TAB at 08:30

## 2021-11-05 RX ADMIN — FAMOTIDINE 20 MG: 10 INJECTION, SOLUTION INTRAVENOUS at 08:28

## 2021-11-05 ASSESSMENT — PAIN SCALES - GENERAL
PAINLEVEL_OUTOF10: 5
PAINLEVEL_OUTOF10: 9

## 2021-11-05 NOTE — PROGRESS NOTES
Nutrition Assessment     Type and Reason for Visit: Reassess    Nutrition Recommendations/Plan:   Encourage po intake  Pt declines ONS     Nutrition Assessment:  Pt continues with poor po intake of meals. D/c'd ONS per pt request. Noted nephrology added K+ restriction d/t hyperkalemia which has since resolved. Modified diet to only include NIALL and K+ restriction to allow for more dietary options to promote po intake. Pt continues to report nausea as the reason for poor po intake. Malnutrition Assessment:  Malnutrition Status: Severe malnutrition    Current Nutrition Therapies:    ADULT DIET; Regular; No Added Salt (3-4 gm); Low Potassium (Less than 3000 mg/day)    Anthropometric Measures:  · Height: 5' 6\" (167.6 cm)  · Current Body Wt: 134 lb (60.8 kg)   · BMI: 21.6    Nutrition Interventions:   Food and/or Nutrient Delivery:  Continue Current Diet   Coordination of Nutrition Care:  Continue to monitor while inpatient    Goals:  PO 50% or more, wt stable or gain       Nutrition Monitoring and Evaluation:   Food/Nutrient Intake Outcomes:  Food and Nutrient Intake  Physical Signs/Symptoms Outcomes:  Biochemical Data, Nausea or Vomiting, Nutrition Focused Physical Findings, Skin, Weight     Discharge Planning:     Too soon to determine     Electronically signed by Tram Davis, MS, RD, LD on 11/5/21 at 1:39 PM CDT    Contact: [phone]

## 2021-11-05 NOTE — PROGRESS NOTES
Call place to Dafne Sears's office at [phone].  Follow up appointment cancelled per pt's request.
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
## 2021-11-05 NOTE — CARE COORDINATION
Janice Horn at [facility] stated that she is working on acceptance of pt to facility. No acceptance or denial at this time.     Electronically signed by Richy Damian on 11/5/2021 at 3:49 PM

## 2021-11-05 NOTE — PROGRESS NOTES
Physical Therapy  [facility]  [id number]     11/05/21 4465   Subjective   Subjective Patient up in chair and agrees to work with therapy. Bed Mobility   Supine to Sit Contact guard assistance   Transfers   Sit to Stand Contact guard assistance   Stand to sit Contact guard assistance   Ambulation   Ambulation? Yes   WB Status NWB RUE through hand, NWB RLE   Other Activities   Comment Patient transferred chair to [facility]. Patient able to perform self care with set up. Patient transferred [facility] back to chair and turned chair for transfer back to bed going towards strong side. Patient transferred chair to bed, returned to supine, positioned for comfort with all needs in reach. Short term goals   Time Frame for Short term goals 2 wks   Short term goal 1 supine to sit indep   Short term goal 2 stand pivot bed to chair transfer indep   Short term goal 3 amb. 10' with RUE platform RW SBA   Activity Tolerance   Activity Tolerance Patient Tolerated treatment well   Safety Devices   Type of devices Bed alarm in place;Call light within reach; Left in bed   Electronically signed by Francia Vuong PTA on 11/5/2021 at 2:52 PM

## 2021-11-05 NOTE — PLAN OF CARE
Nutrition Problem #1: Inadequate protein-energy intake  Intervention: Food and/or Nutrient Delivery: Continue Current Diet  Nutritional Goals: PO 50% or more, wt stable or gain    Problem: Nutrition  Goal: Optimal nutrition therapy  Outcome: Ongoing

## 2021-11-05 NOTE — PROGRESS NOTES
Physical Therapy  Name: Sarwat Sanon  MRN:  [id number]  Date of service:  11/5/2021 11/05/21 1207   Restrictions/Precautions   Restrictions/Precautions Fall Risk;Weight Bearing   Required Braces or Orthoses? Yes   Lower Extremity Weight Bearing Restrictions   Right Lower Extremity Weight Bearing Non Weight Bearing   Upper Extremity Weight Bearing Restrictions   Right Upper Extremity Weight Bearing Non Weight Bearing   Required Braces or Orthoses   Right Lower Extremity Brace Knee Immobilizer   Right Upper Extremity Brace/Splint Sling   Subjective   Subjective Pt agreed to therapy. Pain Screening   Patient Currently in Pain No   Bed Mobility   Supine to Sit Contact guard assistance   Transfers   Sit to Stand Contact guard assistance   Stand to sit Contact guard assistance   Bed to Chair Contact guard assistance;Minimal assistance   Stand Pivot Transfers Contact guard assistance;Minimal Assistance   Ambulation   Ambulation? Yes   WB Status NWB RUE through hand, NWB RLE   Short term goals   Time Frame for Short term goals 2 wks   Short term goal 1 supine to sit indep   Short term goal 2 stand pivot bed to chair transfer indep   Short term goal 3 amb. 10' with RUE platform RW SBA   Conditions Requiring Skilled Therapeutic Intervention   Body structures, Functions, Activity limitations Decreased functional mobility ; Decreased ROM; Decreased strength;Decreased cognition;Decreased safe awareness;Decreased balance; Increased pain;Decreased posture   Assessment Pt transferred to chair stand pivot transfer. Pt has good mobility with bed mobility and transfer but requires some assist for safety due to NWB status. Pt up in chair with all needs in reach. Activity Tolerance   Activity Tolerance Patient Tolerated treatment well   Safety Devices   Type of devices Call light within reach; Left in chair         Electronically signed by Francisco Gonzalez PTA on 11/5/2021 at 12:15 PM
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
## 2021-11-06 VITALS
BODY MASS INDEX: 19.13 KG/M2 | RESPIRATION RATE: 18 BRPM | OXYGEN SATURATION: 99 % | WEIGHT: 119 LBS | HEART RATE: 89 BPM | HEIGHT: 66 IN | DIASTOLIC BLOOD PRESSURE: 64 MMHG | TEMPERATURE: 98.3 F | SYSTOLIC BLOOD PRESSURE: 122 MMHG

## 2021-11-06 LAB
ANION GAP SERPL CALCULATED.3IONS-SCNC: 11 MMOL/L (ref 7–19)
BASOPHILS ABSOLUTE: 0.1 K/UL (ref 0–0.2)
BASOPHILS RELATIVE PERCENT: 0.8 % (ref 0–1)
BUN BLDV-MCNC: 31 MG/DL (ref 8–23)
CALCIUM SERPL-MCNC: 9.1 MG/DL (ref 8.8–10.2)
CHLORIDE BLD-SCNC: 96 MMOL/L (ref 98–111)
CO2: 26 MMOL/L (ref 22–29)
CREAT SERPL-MCNC: 0.9 MG/DL (ref 0.5–0.9)
EOSINOPHILS ABSOLUTE: 0.3 K/UL (ref 0–0.6)
EOSINOPHILS RELATIVE PERCENT: 2.5 % (ref 0–5)
GFR AFRICAN AMERICAN: >59
GFR NON-AFRICAN AMERICAN: >60
GLUCOSE BLD-MCNC: 102 MG/DL (ref 70–99)
GLUCOSE BLD-MCNC: 107 MG/DL (ref 70–99)
GLUCOSE BLD-MCNC: 114 MG/DL (ref 70–99)
GLUCOSE BLD-MCNC: 92 MG/DL (ref 74–109)
HCT VFR BLD CALC: 28 % (ref 37–47)
HEMOGLOBIN: 8.8 G/DL (ref 12–16)
IMMATURE GRANULOCYTES #: 0.1 K/UL
LYMPHOCYTES ABSOLUTE: 3.2 K/UL (ref 1.1–4.5)
LYMPHOCYTES RELATIVE PERCENT: 23.2 % (ref 20–40)
MCH RBC QN AUTO: 29.5 PG (ref 27–31)
MCHC RBC AUTO-ENTMCNC: 31.4 G/DL (ref 33–37)
MCV RBC AUTO: 94 FL (ref 81–99)
MONOCYTES ABSOLUTE: 1.3 K/UL (ref 0–0.9)
MONOCYTES RELATIVE PERCENT: 9.3 % (ref 0–10)
NEUTROPHILS ABSOLUTE: 8.7 K/UL (ref 1.5–7.5)
NEUTROPHILS RELATIVE PERCENT: 63.7 % (ref 50–65)
PDW BLD-RTO: 13.2 % (ref 11.5–14.5)
PERFORMED ON: ABNORMAL
PLATELET # BLD: 962 K/UL (ref 130–400)
PMV BLD AUTO: 9.8 FL (ref 9.4–12.3)
POTASSIUM SERPL-SCNC: 5.2 MMOL/L (ref 3.5–5)
RBC # BLD: 2.98 M/UL (ref 4.2–5.4)
SARS-COV-2, NAAT: NOT DETECTED
SODIUM BLD-SCNC: 133 MMOL/L (ref 136–145)
WBC # BLD: 13.6 K/UL (ref 4.8–10.8)

## 2021-11-06 PROCEDURE — 36415 COLL VENOUS BLD VENIPUNCTURE: CPT

## 2021-11-06 PROCEDURE — 82947 ASSAY GLUCOSE BLOOD QUANT: CPT

## 2021-11-06 PROCEDURE — 2500000003 HC RX 250 WO HCPCS: Performed by: INTERNAL MEDICINE

## 2021-11-06 PROCEDURE — 80048 BASIC METABOLIC PNL TOTAL CA: CPT

## 2021-11-06 PROCEDURE — 6370000000 HC RX 637 (ALT 250 FOR IP): Performed by: STUDENT IN AN ORGANIZED HEALTH CARE EDUCATION/TRAINING PROGRAM

## 2021-11-06 PROCEDURE — 6360000002 HC RX W HCPCS: Performed by: ORTHOPAEDIC SURGERY

## 2021-11-06 PROCEDURE — 87635 SARS-COV-2 COVID-19 AMP PRB: CPT

## 2021-11-06 PROCEDURE — 2580000003 HC RX 258: Performed by: STUDENT IN AN ORGANIZED HEALTH CARE EDUCATION/TRAINING PROGRAM

## 2021-11-06 PROCEDURE — 85025 COMPLETE CBC W/AUTO DIFF WBC: CPT

## 2021-11-06 PROCEDURE — 6370000000 HC RX 637 (ALT 250 FOR IP): Performed by: HOSPITALIST

## 2021-11-06 PROCEDURE — 6370000000 HC RX 637 (ALT 250 FOR IP): Performed by: ORTHOPAEDIC SURGERY

## 2021-11-06 PROCEDURE — 6370000000 HC RX 637 (ALT 250 FOR IP): Performed by: INTERNAL MEDICINE

## 2021-11-06 RX ORDER — FERROUS SULFATE 325(65) MG
325 TABLET ORAL 2 TIMES DAILY WITH MEALS
Qty: 30 TABLET | Refills: 0 | Status: SHIPPED | OUTPATIENT
Start: 2021-11-06

## 2021-11-06 RX ORDER — SODIUM POLYSTYRENE SULFONATE 15 G/60ML
15 SUSPENSION ORAL; RECTAL ONCE
Status: COMPLETED | OUTPATIENT
Start: 2021-11-06 | End: 2021-11-06

## 2021-11-06 RX ORDER — CYCLOBENZAPRINE HCL 10 MG
10 TABLET ORAL 3 TIMES DAILY PRN
Qty: 30 TABLET | Refills: 0 | Status: SHIPPED | OUTPATIENT
Start: 2021-11-06 | End: 2021-11-16

## 2021-11-06 RX ORDER — SENNA PLUS 8.6 MG/1
1 TABLET ORAL 2 TIMES DAILY
Qty: 20 TABLET | Refills: 0 | Status: SHIPPED | OUTPATIENT
Start: 2021-11-06 | End: 2021-11-16

## 2021-11-06 RX ORDER — OXYCODONE HYDROCHLORIDE 5 MG/1
5 TABLET ORAL EVERY 6 HOURS PRN
Qty: 12 TABLET | Refills: 0 | Status: SHIPPED | OUTPATIENT
Start: 2021-11-06 | End: 2021-11-09

## 2021-11-06 RX ORDER — NICOTINE 21 MG/24HR
1 PATCH, TRANSDERMAL 24 HOURS TRANSDERMAL DAILY
Qty: 30 PATCH | Refills: 0 | Status: SHIPPED | OUTPATIENT
Start: 2021-11-07

## 2021-11-06 RX ORDER — DOCUSATE SODIUM 100 MG/1
100 CAPSULE, LIQUID FILLED ORAL 2 TIMES DAILY
Status: DISCONTINUED | OUTPATIENT
Start: 2021-11-06 | End: 2021-11-06 | Stop reason: HOSPADM

## 2021-11-06 RX ADMIN — POLYETHYLENE GLYCOL 3350 17 G: 17 POWDER, FOR SOLUTION ORAL at 09:48

## 2021-11-06 RX ADMIN — ONDANSETRON 4 MG: 2 INJECTION INTRAMUSCULAR; INTRAVENOUS at 17:25

## 2021-11-06 RX ADMIN — APIXABAN 5 MG: 5 TABLET, FILM COATED ORAL at 09:48

## 2021-11-06 RX ADMIN — FERROUS SULFATE TAB 325 MG (65 MG ELEMENTAL FE) 325 MG: 325 (65 FE) TAB at 09:48

## 2021-11-06 RX ADMIN — ONDANSETRON 4 MG: 2 INJECTION INTRAMUSCULAR; INTRAVENOUS at 08:52

## 2021-11-06 RX ADMIN — SODIUM POLYSTYRENE SULFONATE 15 G: 15 SUSPENSION ORAL; RECTAL at 09:48

## 2021-11-06 RX ADMIN — FAMOTIDINE 20 MG: 10 INJECTION, SOLUTION INTRAVENOUS at 09:48

## 2021-11-06 RX ADMIN — METOPROLOL TARTRATE 25 MG: 25 TABLET, FILM COATED ORAL at 09:48

## 2021-11-06 RX ADMIN — SENNOSIDES 8.6 MG: 8.6 TABLET, FILM COATED ORAL at 14:47

## 2021-11-06 RX ADMIN — ATORVASTATIN CALCIUM 20 MG: 20 TABLET, FILM COATED ORAL at 09:48

## 2021-11-06 RX ADMIN — DOCUSATE SODIUM 100 MG: 100 CAPSULE, LIQUID FILLED ORAL at 14:47

## 2021-11-06 RX ADMIN — OXYCODONE 10 MG: 5 TABLET ORAL at 09:48

## 2021-11-06 RX ADMIN — SODIUM CHLORIDE, PRESERVATIVE FREE 10 ML: 5 INJECTION INTRAVENOUS at 09:49

## 2021-11-06 ASSESSMENT — PAIN SCALES - GENERAL
PAINLEVEL_OUTOF10: 3
PAINLEVEL_OUTOF10: 7
PAINLEVEL_OUTOF10: 0

## 2021-11-06 NOTE — PLAN OF CARE
Problem: Skin Integrity:  Goal: Will show no infection signs and symptoms  Description: Will show no infection signs and symptoms  11/6/2021 1553 by Adore Mathew RN  Outcome: Ongoing  11/6/2021 0438 by Talib Singh RN  Outcome: Ongoing  Goal: Absence of new skin breakdown  Description: Absence of new skin breakdown  11/6/2021 1553 by Adore Mathew RN  Outcome: Ongoing  11/6/2021 0438 by Talib Singh RN  Outcome: Ongoing     Problem: Falls - Risk of:  Goal: Will remain free from falls  Description: Will remain free from falls  11/6/2021 1553 by Adore Mathew RN  Outcome: Ongoing  11/6/2021 0438 by Talib Singh RN  Outcome: Ongoing  Goal: Absence of physical injury  Description: Absence of physical injury  11/6/2021 1553 by Adore Mathew RN  Outcome: Ongoing  11/6/2021 0438 by Talib Singh RN  Outcome: Ongoing     Problem: Nutrition  Goal: Optimal nutrition therapy  11/6/2021 1553 by Adore Mathew RN  Outcome: Ongoing  11/6/2021 0438 by Talib Singh RN  Outcome: Ongoing     Problem: Pain:  Goal: Pain level will decrease  Description: Pain level will decrease  11/6/2021 1553 by Adore Mathew RN  Outcome: Ongoing  11/6/2021 0438 by Talib Singh RN  Outcome: Ongoing  Goal: Control of acute pain  Description: Control of acute pain  11/6/2021 1553 by Adore Mathew RN  Outcome: Ongoing  11/6/2021 0438 by Talib Singh RN  Outcome: Ongoing  Goal: Control of chronic pain  Description: Control of chronic pain  11/6/2021 1553 by Adore Mathew RN  Outcome: Ongoing  11/6/2021 0438 by Talib Singh RN  Outcome: Ongoing
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
## 2021-11-06 NOTE — PROGRESS NOTES
Orthopedic Surgery Progress Note    Sarwat Sonoita  11/6/2021      Subjective:     Systemic or Specific Complaints: 2 WEEKS PO RIGHT DISTAL RADIUS/DISTAL FEMUR REPAIRS. STABLE ORTHO WISE . GOING TO SNF TODAY. Objective:     Patient Vitals for the past 24 hrs:   BP Temp Temp src Pulse Resp SpO2 Weight   11/06/21 0657 106/61 96.9 °F (36.1 °C) Temporal 62 16 97 % 119 lb (54 kg)   11/06/21 0009 111/62 98.2 °F (36.8 °C) Temporal 64 16 95 %    11/05/21 1857 (!) 134/97 97.6 °F (36.4 °C) Temporal 83 18 96 %    11/05/21 1230 107/84 98.2 °F (36.8 °C) Temporal 85 22 100 %        right upper/RIGHT LOWER  General: alert, appears stated age and cooperative   Wound: clean, dry, intact             Dressing: clean, dry, and intact   Extremity: Distal NVI           DVT Exam: No evidence of DVT seen on physical exam.                   Data Review:  Recent Labs     11/05/21 0222 11/06/21 0427   HGB 8.6* 8.8*     Recent Labs     11/06/21 0427   *   K 5.2*   CREATININE 0.9       Assessment:     POD# 14 ORIF RIGHT DISTAL RADIUS/RIGHT DISTAL FEMUR    Plan:      1:  DVT prophylaxis, ICE, elevate  2:  Pain control  3:  Physical therapy/Occupational therapy  4:  Anticipate discharge today if pain well controlled  5: Non-weight bearing RIGHT U/L EXTREMITY. OK FOR NWB ROM FOR RIGHT KNEE.         Electronically signed by Rhianna Gee PA-C on 11/6/2021 at 9:01 AM

## 2021-11-06 NOTE — PROGRESS NOTES
Melissa Parrish from [facility] was speaking with patient on the phone when I entered her room and asked to speak with her nurse. She stated that she has been accepted to [facility] and she will be here in the morning to sign papers with patient. However, she was concerned about pt's follow up with Ortho since she doesn't have family or transportation. I notified her that there isn't a note from Ortho since 10/29 but that patient was supposed to have a follow up today and Ortho has been reconsulted to see patient here in the morning since she was never discharged. Melissa Shivani would like to be notified of the plan as soon as possible so she knows if she can DC tomorrow or if her cast will need changed and DC postponed. Melissa Parrish [phone]. Also, pt will need a rapid covid prior to DC.  Electronically signed by Melinda Daniel RN on 11/5/2021 at 9:41 PM

## 2021-11-08 ENCOUNTER — TELEPHONE (OUTPATIENT)
Dept: PODIATRY | Facility: CLINIC | Age: 61
End: 2021-11-08

## 2021-11-08 NOTE — TELEPHONE ENCOUNTER
Called patient regarding appt on 11/09/2021. Left message for patient to return call if any questions or concerns arise.

## 2021-11-29 ENCOUNTER — APPOINTMENT (OUTPATIENT)
Dept: CARDIOLOGY | Facility: HOSPITAL | Age: 61
End: 2021-11-29

## 2021-12-03 NOTE — PROGRESS NOTES
Physician Progress Note      PATIENT:               Rosaura Ryan  CSN #:                  [id number]  :                       1960  ADMIT DATE:       10/23/2021 1:41 AM  DISCH DATE:        2021 5:31 PM  RESPONDING  PROVIDER #:        Sanjay Branch        QUERY TEXT:    Type of Anemia: Please provide further specificity, if known. Clinical indicators include: postoperative anemia, hgb  Options provided:  -- Anemia due to acute blood loss  -- Anemia due to chronic blood loss  -- Anemia due to iron deficiency  -- Anemia due to postoperative blood loss  -- Anemia due to chronic disease  -- Other - I will add my own diagnosis  -- Disagree - Not applicable / Not valid  -- Disagree - Clinically Unable to determine / Unknown        PROVIDER RESPONSE TEXT:    The patient has anemia due to chronic disease.       Electronically signed by:  Sanjay Branch 12/3/2021 8:40 AM

## 2021-12-16 NOTE — PROGRESS NOTES
09/07/21 1436   General Information   Onset of Illness/Injury or Date of Surgery 09/06/21   Referring Physician Patricio Brooks MD   Behavioral Issues difficulty managing stress;overwhelmed easily   Patient/Family Therapy Goal Statement (SLP) Patient initially reluctant, but later agreed to instrumental exam.   Pertinent History of Current Problem Patient referred for instrumental exam to further assess oropharyngeal swallow function given brainstem infarct.   General Observations Patient expressed concern about vomiting barium during/after study. Patient movement/repositioning back/forward during exam at times.   Type of Evaluation   Type of Evaluation Swallow Evaluation   General Swallowing Observations   Swallowing Evaluation Videofluoroscopic swallow study (VFSS)   VFSS Evaluation   Radiologist Dr. Bray   Views Taken left lateral   Physical Location of Procedure [facility] radiology suite   VFSS Textures Trialed thin liquids;mildly thick liquids;pureed   VFSS Eval: Thin Liquid Texture Trial   Mode of Presentation, Thin Liquid spoon;cup;straw;fed by clinician;self-fed   Order of Presentation 2, 3 (spoon), 4, 8, 9, 10 (cup); 6,7 (straw   Preparatory Phase Poor bolus control   Oral Phase, Thin Liquid Poor AP movement   Pharyngeal Phase, Thin Liquid Delayed swallow reflex   Rosenbek's Penetration Aspiration Scale: Thin Liquid Trial Results 8 - contrast passes glottis, visible subglottic residue remains, absent patient response (aspiration)  (2 instances with and without chin tuck technique)   Response to Aspiration absent response, silent aspiration  (inconsistent cough response to aspiration)   Successful Strategies Trialed During Procedure, Thin Liquid   (chin tuck not effective in eliminating risk)   Diagnostic Statement Deep flash laryngeal penetration of thin by spoon x1. Moderate penetration with trace residue laryngeal vestibule x1 with straw.   VFSS Eval: Mildly Thick Liquids   Mode  Subjective     Patient is a 71-year-old female comes in complaining of cough for the past few days.  Patient reports that she was seen at the [facility] earlier today and diagnosed with a sinus infection as she has had congestion for the past 3 days.  Patient states that they had a family function on Saturday and someone had Covid at that time.  Patient reports that she is vaccinated for both Covid and flu.  Patient otherwise denies nausea, vomiting, diarrhea, abdominal pain, chest pain, shortness of breath, fever, urinary symptoms.  Patient does report swelling in her left ankle after she injured this about a week ago.  Patient states she suffered a fall but did not hit her head or lose consciousness at that time.  Patient is concerned because she has continued swelling in the area.  Patient otherwise denies any blood clots.        Review of Systems   Constitutional: Positive for chills and fatigue. Negative for diaphoresis and fever.   HENT: Positive for congestion. Negative for sore throat, tinnitus and trouble swallowing.    Eyes: Negative for photophobia, discharge and visual disturbance.   Respiratory: Positive for cough. Negative for shortness of breath and wheezing.    Cardiovascular: Negative for chest pain, palpitations and leg swelling.   Gastrointestinal: Negative for abdominal pain, blood in stool, diarrhea, nausea and vomiting.   Genitourinary: Negative for dysuria, flank pain, frequency and urgency.   Musculoskeletal: Negative for arthralgias and myalgias.   Skin: Negative for rash.   Neurological: Negative for dizziness, syncope, speech difficulty, weakness, light-headedness, numbness and headaches.   Psychiatric/Behavioral: Negative for confusion.       History reviewed. No pertinent past medical history.    No Known Allergies    Past Surgical History:   Procedure Laterality Date   • BREAST BIOPSY     • HYSTERECTOMY         Family History   Problem Relation Age of Onset   • Breast cancer Sister         Social History     Socioeconomic History   • Marital status:            Objective   Physical Exam  Vitals and nursing note reviewed.   Constitutional:       General: She is not in acute distress.     Appearance: She is well-developed. She is not diaphoretic.   HENT:      Head: Normocephalic and atraumatic.      Right Ear: Ear canal and external ear normal.      Left Ear: Ear canal and external ear normal.      Nose: Nose normal.      Mouth/Throat:      Mouth: Mucous membranes are moist.      Pharynx: No oropharyngeal exudate or posterior oropharyngeal erythema.   Eyes:      Extraocular Movements: Extraocular movements intact.      Conjunctiva/sclera: Conjunctivae normal.      Pupils: Pupils are equal, round, and reactive to light.   Cardiovascular:      Rate and Rhythm: Normal rate and regular rhythm.      Pulses: Normal pulses.      Heart sounds: Normal heart sounds.      Comments: S1, S2 audible.  Pulmonary:      Effort: Pulmonary effort is normal. No respiratory distress.      Breath sounds: Normal breath sounds. No wheezing, rhonchi or rales.      Comments: On room air.  Abdominal:      General: Bowel sounds are normal. There is no distension.      Palpations: Abdomen is soft.      Tenderness: There is no abdominal tenderness. There is no guarding or rebound.   Musculoskeletal:         General: No tenderness or deformity. Normal range of motion.      Cervical back: Normal range of motion.      Right lower leg: No edema.      Left lower leg: Edema (trace) present.   Skin:     General: Skin is warm.      Capillary Refill: Capillary refill takes less than 2 seconds.      Findings: No erythema or rash.   Neurological:      General: No focal deficit present.      Mental Status: She is alert and oriented to person, place, and time.      Cranial Nerves: No cranial nerve deficit.      Sensory: No sensory deficit.      Motor: No weakness.   Psychiatric:         Mood and Affect: Mood normal.         Behavior:  of Presentation spoon;cup;fed by clinician;self-fed   Order of Presentation 1 (spoon), 11 (cup)   Preparatory Phase WFL   Oral Phase WFL   Pharyngeal Phase   (mild/trace pharyngeal residue (PPW and above CP opening))   Rosenbek's Penetration Aspiration Scale 1 - no aspiration, contrast does not enter airway   VFSS Evaluation: Puree Solid Texture Trial   Mode of Presentation, Puree spoon;fed by clinician   Order of Presentation 5   Preparatory Phase WFL   Oral Phase, Puree WFL   Pharyngeal Phase, Puree   (trace pharyngeal residue at CP opening)   Rosenbek's Penetration Aspiration Scale: Puree Food Trial Results 1 - no aspiration, contrast does not enter airway   Esophageal Phase of Swallow   Esophageal sweep performed during today s vidofluoroscopic exam  No   Swallowing Recommendations   Diet Consistency Recommendations soft & bite-sized (level 6);mildly thick liquids (level 2)   Supervision Level for Intake close supervision needed   Mode of Delivery Recommendations bolus size, small;no straws;slow rate of intake   Monitoring/Assistance Required (Eating/Swallowing) monitor for cough or change in vocal quality with intake   Recommended Feeding/Eating Techniques (Swallow Eval) maintain upright posture during/after eating for 30 minutes   Medication Administration Recommendations, Swallowing (SLP) One at a time with nectar thick liquid.   General Therapy Interventions   Planned Therapy Interventions Dysphagia Treatment   Dysphagia treatment Oropharyngeal exercise training;Modified diet education;Instruction of safe swallow strategies   SLP Therapy Assessment/Plan   Criteria for Skilled Therapeutic Interventions Met (SLP Eval) yes;treatment indicated   SLP Diagnosis moderate oropharyngeal dysphagia   Rehab Potential (SLP Eval) good, to achieve stated therapy goals   Therapy Frequency (SLP Eval) 5 times/wk   Predicted Duration of Therapy Intervention (SLP Eval) 2 weeks   Comment, Therapy Assessment/Plan (SLP) Patient  "Behavior normal.         Procedures           ED Course      /61 (BP Location: Right arm, Patient Position: Lying)   Pulse 64   Temp 96.3 °F (35.7 °C) (Temporal)   Resp 18   Ht 167.6 cm (66\")   Wt 93.7 kg (206 lb 9.6 oz)   SpO2 97%   BMI 33.35 kg/m²   Labs Reviewed   RESPIRATORY PANEL PCR W/ COVID-19 (SARS-COV-2) GABRIEL/SHAWN/DEE DEE/PAD/COR/MAD/DAVID IN-HOUSE, NP SWAB IN UTM/VTP, 3-4 HR TAT - Normal    Narrative:     In the setting of a positive respiratory panel with a viral infection PLUS a negative procalcitonin without other underlying concern for bacterial infection, consider observing off antibiotics or discontinuation of antibiotics and continue supportive care. If the respiratory panel is positive for atypical bacterial infection (Bordetella pertussis, Chlamydophila pneumoniae, or Mycoplasma pneumoniae), consider antibiotic de-escalation to target atypical bacterial infection.     XR Ankle 3+ View Left    Result Date: 12/16/2021  Diffuse left ankle soft tissue swelling. No acute left ankle osseous abnormality.  Electronically Signed By-Britt Lemons MD On:12/16/2021 3:35 PM This report was finalized on [id number] by  Britt Lemons MD.    XR Foot 3+ View Left    Result Date: 12/16/2021  No acute left foot findings.  Electronically Signed By-Britt Lemons MD On:12/16/2021 3:33 PM This report was finalized on [id number] by  Britt Lemons MD.    XR Chest 1 View    Result Date: 12/16/2021  Question trace left costophrenic angle pleural fluid. Mild cardiomegaly.  Electronically Signed By-Britt Lemons MD On:12/16/2021 3:30 PM This report was finalized on [id number] by  Britt Lemons MD.                                               MDM  Number of Diagnoses or Management Options     Amount and/or Complexity of Data Reviewed  Clinical lab tests: reviewed  Tests in the radiology section of CPT®: reviewed    Risk of Complications, Morbidity, and/or Mortality  Presenting problems: low  Diagnostic " presents with moderate oropharyngeal dysphagia under fluoroscopy characterized by reduced oral bolus control/coordination, delayed swallow response, inconsistent incomplete epiglottic inversion resulting in trace silent aspiration or deep laryngeal penetration of thin liquid and mild/trace pharyngeal residue (posterior pharyngeal wall and at level of UES opening). Chin tuck technique did not eliminate or reduce risk for penetration/aspiration. No aspiration of mildly thick liquid or puree consistency occurred during today's study. Did not assess advanced solid textures due to patient nausea and wish for least amount of barium intake as possible. Note impulsivity during study and poor positioning at times due to motion. Recommend IDDSI level 6 (soft/bite-sized) solids with IDDSI level 2 (mildly thick) liquids given close supervision and swallow strategies (fully upright position, no straws, small single sips/bites, slow rate). Please serve pills 1 at a time with mildly thick liquid or applesauce. Monitor for signs/symptoms of aspiration and hold PO if occur until further assessment. Will continue to follow for diet tolerance and readiness for further advancement.  Patient will likely require repeat VFSS prior to advancing liquids given silent nature of aspiration on today's study.   Therapy Plan Review/Discharge Plan (SLP)   Therapy Plan Review (SLP) evaluation/treatment results reviewed;care plan/treatment goals reviewed;risks/benefits reviewed;current/potential barriers reviewed;participants voiced agreement with care plan;participants included;patient   SLP Discharge Planning    SLP Discharge Recommendation (DC Rec) [facility] patient will benefit from intensive, interdisciplinary therapy.  Anticipate will be able to tolerate 3 hours of therapy per day   SLP Rationale for DC Rec Patient below baseline function for swallow and currently NPO status.   SLP Brief overview of current status  Recommend  "procedures: low  Management options: low    Patient Progress  Patient progress: stable       Chart review:    EKG:    Imaging:    XR Ankle 3+ View Left   Final Result   Diffuse left ankle soft tissue swelling. No acute left ankle osseous   abnormality.       Electronically Signed By-Britt Lemons MD On:12/16/2021 3:35 PM   This report was finalized on [id number] by  Britt Lemons MD.      XR Foot 3+ View Left   Final Result   No acute left foot findings.       Electronically Signed By-Britt Lemons MD On:12/16/2021 3:33 PM   This report was finalized on [id number] by  Britt Lemons MD.      XR Chest 1 View   Final Result   Question trace left costophrenic angle pleural fluid.   Mild cardiomegaly.       Electronically Signed By-Britt Lemons MD On:12/16/2021 3:30 PM   This report was finalized on [id number] by  Britt Lemons MD.          Labs: Respiratory viral panel COVID-19 swab negative.  Vitals:  /61 (BP Location: Right arm, Patient Position: Lying)   Pulse 64   Temp 96.3 °F (35.7 °C) (Temporal)   Resp 18   Ht 167.6 cm (66\")   Wt 93.7 kg (206 lb 9.6 oz)   SpO2 97%   BMI 33.35 kg/m²     Medications given:  Medications - No data to display    Procedures:    MDM: Patient is a 71-year-old female comes in complaining of cough and congestion.  Chest x-ray unremarkable for acute findings.  Respiratory viral panel COVID-19 swab negative for COVID-19 and otherwise negative.  X-ray of left foot and ankle were obtained and unremarkable for acute findings.  Duplex ultrasound of left lower extremity negative for DVT.  Patient was instructed to keep her ankle elevated and to ice as needed to help with swelling and compression stockings.  Patient voiced understanding with this.  Patient was given strict return precautions and voiced understanding.  Patient was sent antibiotic to her pharmacy if symptoms persist greater than a week as she is not currently meeting criteria for bacterial sinusitis.  " IDDSI level 6 (soft/bite-sized) solids with IDDSI level 2 (mildly thick) liquids given close supervision and swallow strategies (fully upright position, no straws, small single sips/bites, slow rate). Please serve pills 1 at a time with mildly thick liquid or applesauce. Monitor for signs/symptoms of aspiration and hold PO if occur until further assessment. Will continue to follow for diet tolerance and readiness for further advancement.  Patient will likely require repeat VFSS prior to advancing liquids given silent nature of aspiration on today's study.    Total Evaluation Time   Total Evaluation Time (Minutes) 20      Patient voiced understanding to this and states that she will fill this if her symptoms persist. See full discharge instructions for further details.  Results and plan discussed with patient and is agreeable with plan.    Final diagnoses:   Cough   Viral illness   Left ankle swelling   Acute left ankle pain   Acute frontal sinusitis, recurrence not specified       ED Disposition  ED Disposition     ED Disposition Condition Comment    Discharge Stable           [facility] EMERGENCY DEPARTMENT  [address] [facility] [address] [phone]  Go to   As needed, If symptoms worsen    Fartun Timmons APRN  [address] [id number]  [phone]    Schedule an appointment as soon as possible for a visit in 1 week  As needed         Medication List      New Prescriptions    azithromycin 250 MG tablet  Commonly known as: ZITHROMAX  Take 2 tablets by mouth Daily.           Where to Get Your Medications      These medications were sent to [id number] - [place], [place] - [address] AT [facility] & [address] - [phone]  - [phone] FX  [address], [place] [address]    Phone: [phone]   · azithromycin 250 MG tablet          Sixto Trna PA  12/17/21 0052

## 2022-01-24 ENCOUNTER — OFFICE VISIT (OUTPATIENT)
Dept: CARDIOLOGY | Facility: CLINIC | Age: 62
End: 2022-01-24

## 2022-01-24 VITALS
DIASTOLIC BLOOD PRESSURE: 80 MMHG | HEART RATE: 80 BPM | BODY MASS INDEX: 19.29 KG/M2 | WEIGHT: 120 LBS | SYSTOLIC BLOOD PRESSURE: 130 MMHG | OXYGEN SATURATION: 98 % | HEIGHT: 66 IN

## 2022-01-24 DIAGNOSIS — I10 PRIMARY HYPERTENSION: ICD-10-CM

## 2022-01-24 DIAGNOSIS — I25.110 CORONARY ARTERY DISEASE INVOLVING NATIVE CORONARY ARTERY OF NATIVE HEART WITH UNSTABLE ANGINA PECTORIS: ICD-10-CM

## 2022-01-24 DIAGNOSIS — E78.2 MIXED HYPERLIPIDEMIA: ICD-10-CM

## 2022-01-24 DIAGNOSIS — I63.10 CEREBROVASCULAR ACCIDENT (CVA) DUE TO EMBOLISM OF PRECEREBRAL ARTERY: ICD-10-CM

## 2022-01-24 DIAGNOSIS — I48.0 PAROXYSMAL ATRIAL FIBRILLATION: Primary | ICD-10-CM

## 2022-01-24 PROCEDURE — 99214 OFFICE O/P EST MOD 30 MIN: CPT | Performed by: INTERNAL MEDICINE

## 2022-01-24 PROCEDURE — 93000 ELECTROCARDIOGRAM COMPLETE: CPT | Performed by: INTERNAL MEDICINE

## 2022-01-24 RX ORDER — CYCLOBENZAPRINE HCL 10 MG
TABLET ORAL AS NEEDED
COMMUNITY
End: 2022-03-09
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
## 2022-02-07 ENCOUNTER — HOSPITAL ENCOUNTER (OUTPATIENT)
Dept: CARDIOLOGY | Facility: HOSPITAL | Age: 62
Discharge: HOME | End: 2022-02-07

## 2022-02-07 VITALS
HEART RATE: 69 BPM | DIASTOLIC BLOOD PRESSURE: 103 MMHG | BODY MASS INDEX: 19.27 KG/M2 | SYSTOLIC BLOOD PRESSURE: 161 MMHG | HEIGHT: 66 IN | WEIGHT: 119.93 LBS

## 2022-02-07 DIAGNOSIS — I20.0 UNSTABLE ANGINA: ICD-10-CM

## 2022-02-07 PROCEDURE — A9500 TC99M SESTAMIBI: HCPCS | Performed by: INTERNAL MEDICINE

## 2022-02-07 PROCEDURE — 93017 CV STRESS TEST TRACING ONLY: CPT

## 2022-02-07 PROCEDURE — 78452 HT MUSCLE IMAGE SPECT MULT: CPT

## 2022-02-07 PROCEDURE — 0 TECHNETIUM SESTAMIBI: Performed by: INTERNAL MEDICINE

## 2022-02-07 PROCEDURE — 93018 CV STRESS TEST I&R ONLY: CPT | Performed by: INTERNAL MEDICINE

## 2022-02-07 PROCEDURE — 78452 HT MUSCLE IMAGE SPECT MULT: CPT | Performed by: INTERNAL MEDICINE

## 2022-02-07 PROCEDURE — 25010000002 REGADENOSON 0.4 MG/5ML SOLUTION: Performed by: INTERNAL MEDICINE

## 2022-02-07 RX ADMIN — TECHNETIUM TC 99M SESTAMIBI 1 DOSE: 1 INJECTION INTRAVENOUS at 12:00

## 2022-02-07 RX ADMIN — TECHNETIUM TC 99M SESTAMIBI 1 DOSE: 1 INJECTION INTRAVENOUS at 10:23

## 2022-02-07 RX ADMIN — REGADENOSON 0.4 MG: 0.08 INJECTION, SOLUTION INTRAVENOUS at 11:46

## 2022-02-09 LAB
BH CV REST NUCLEAR ISOTOPE DOSE: 11 MCI
BH CV STRESS BP STAGE 1: NORMAL
BH CV STRESS COMMENTS STAGE 1: NORMAL
BH CV STRESS DOSE REGADENOSON STAGE 1: 0.4
BH CV STRESS DURATION MIN STAGE 1: 0
BH CV STRESS DURATION SEC STAGE 1: 10
BH CV STRESS HR STAGE 1: 94
BH CV STRESS NUCLEAR ISOTOPE DOSE: 33.3 MCI
BH CV STRESS PROTOCOL 1: NORMAL
BH CV STRESS RECOVERY BP: NORMAL MMHG
BH CV STRESS RECOVERY HR: 85 BPM
BH CV STRESS STAGE 1: 1
LV EF NUC BP: 72 %
MAXIMAL PREDICTED HEART RATE: 157 BPM
PERCENT MAX PREDICTED HR: 59.87 %
STRESS BASELINE BP: NORMAL MMHG
STRESS BASELINE HR: 69 BPM
STRESS PERCENT HR: 70 %
STRESS POST EXERCISE DUR SEC: 10 SEC
STRESS POST PEAK BP: NORMAL MMHG
STRESS POST PEAK HR: 94 BPM
STRESS TARGET HR: 133 BPM

## 2022-02-21 ENCOUNTER — PREP FOR SURGERY (OUTPATIENT)
Dept: OTHER | Facility: HOSPITAL | Age: 62
End: 2022-02-21

## 2022-02-21 DIAGNOSIS — I25.110 CORONARY ARTERY DISEASE INVOLVING NATIVE CORONARY ARTERY OF NATIVE HEART WITH UNSTABLE ANGINA PECTORIS: Primary | ICD-10-CM

## 2022-02-21 RX ORDER — DIPHENHYDRAMINE HCL 25 MG
25 CAPSULE ORAL ONCE
Status: CANCELLED | OUTPATIENT
Start: 2022-02-21 | End: 2022-02-21

## 2022-02-21 RX ORDER — SODIUM CHLORIDE 9 MG/ML
125 INJECTION, SOLUTION INTRAVENOUS ONCE
Status: CANCELLED | OUTPATIENT
Start: 2022-02-21 | End: 2022-02-21

## 2022-02-21 RX ORDER — LIDOCAINE HYDROCHLORIDE 10 MG/ML
0.1 INJECTION, SOLUTION EPIDURAL; INFILTRATION; INTRACAUDAL; PERINEURAL ONCE AS NEEDED
Status: CANCELLED | OUTPATIENT
Start: 2022-02-21

## 2022-02-23 ENCOUNTER — TELEPHONE (OUTPATIENT)
Dept: CARDIOLOGY | Facility: CLINIC | Age: 62
End: 2022-02-23

## 2022-02-23 DIAGNOSIS — Z91.041 ALLERGY TO CONTRAST MEDIA (USED FOR DIAGNOSTIC X-RAYS): Primary | ICD-10-CM

## 2022-02-23 RX ORDER — DIPHENHYDRAMINE HCL 25 MG
25 TABLET ORAL TAKE AS DIRECTED
Qty: 1 TABLET | Refills: 0 | Status: SHIPPED | OUTPATIENT
Start: 2022-02-28 | End: 2022-03-09

## 2022-02-23 RX ORDER — CIMETIDINE 300 MG/1
300 TABLET, FILM COATED ORAL TAKE AS DIRECTED
Qty: 1 TABLET | Refills: 0 | Status: SHIPPED | OUTPATIENT
Start: 2022-02-28 | End: 2022-03-09

## 2022-02-23 RX ORDER — PREDNISONE 20 MG/1
80 TABLET ORAL TAKE AS DIRECTED
Qty: 4 TABLET | Refills: 0 | Status: SHIPPED | OUTPATIENT
Start: 2022-02-28 | End: 2022-03-09

## 2022-02-23 NOTE — TELEPHONE ENCOUNTER
Ms. Ruff has a contrast dye allergy and will need pretreatment.  Please prescribe to [facility].  Her cath is scheduled for 2/28/22.  Thank you.

## 2022-02-25 ENCOUNTER — LAB (OUTPATIENT)
Dept: LAB | Facility: HOSPITAL | Age: 62
End: 2022-02-25

## 2022-02-25 DIAGNOSIS — I25.110 CORONARY ARTERY DISEASE INVOLVING NATIVE CORONARY ARTERY OF NATIVE HEART WITH UNSTABLE ANGINA PECTORIS: ICD-10-CM

## 2022-02-25 LAB
FLUAV RNA RESP QL NAA+PROBE: NOT DETECTED
FLUBV RNA RESP QL NAA+PROBE: NOT DETECTED
SARS-COV-2 RNA RESP QL NAA+PROBE: NOT DETECTED

## 2022-02-25 PROCEDURE — C9803 HOPD COVID-19 SPEC COLLECT: HCPCS

## 2022-02-25 PROCEDURE — 87636 SARSCOV2 & INF A&B AMP PRB: CPT

## 2022-02-28 ENCOUNTER — HOSPITAL ENCOUNTER (OUTPATIENT)
Facility: HOSPITAL | Age: 62
Discharge: HOME | End: 2022-02-28
Attending: INTERNAL MEDICINE | Admitting: INTERNAL MEDICINE

## 2022-02-28 VITALS
HEART RATE: 69 BPM | RESPIRATION RATE: 19 BRPM | OXYGEN SATURATION: 97 % | WEIGHT: 125.4 LBS | DIASTOLIC BLOOD PRESSURE: 88 MMHG | HEIGHT: 66 IN | BODY MASS INDEX: 20.15 KG/M2 | SYSTOLIC BLOOD PRESSURE: 160 MMHG

## 2022-02-28 DIAGNOSIS — I25.110 CORONARY ARTERY DISEASE INVOLVING NATIVE CORONARY ARTERY OF NATIVE HEART WITH UNSTABLE ANGINA PECTORIS: ICD-10-CM

## 2022-02-28 LAB
ALBUMIN SERPL-MCNC: 4.2 G/DL (ref 3.5–5.2)
ALBUMIN/GLOB SERPL: 1.3 G/DL
ALP SERPL-CCNC: 97 U/L (ref 39–117)
ALT SERPL W P-5'-P-CCNC: <5 U/L (ref 1–33)
ANION GAP SERPL CALCULATED.3IONS-SCNC: 10 MMOL/L (ref 5–15)
AST SERPL-CCNC: 12 U/L (ref 1–32)
BASOPHILS # BLD AUTO: 0.01 10*3/MM3 (ref 0–0.2)
BASOPHILS NFR BLD AUTO: 0.2 % (ref 0–1.5)
BILIRUB SERPL-MCNC: 0.2 MG/DL (ref 0–1.2)
BUN SERPL-MCNC: 12 MG/DL (ref 8–23)
BUN/CREAT SERPL: 14.1 (ref 7–25)
CALCIUM SPEC-SCNC: 9.1 MG/DL (ref 8.6–10.5)
CHLORIDE SERPL-SCNC: 103 MMOL/L (ref 98–107)
CO2 SERPL-SCNC: 26 MMOL/L (ref 22–29)
CREAT SERPL-MCNC: 0.85 MG/DL (ref 0.57–1)
DEPRECATED RDW RBC AUTO: 47.9 FL (ref 37–54)
EGFRCR SERPLBLD CKD-EPI 2021: 77.1 ML/MIN/1.73
EOSINOPHIL # BLD AUTO: 0 10*3/MM3 (ref 0–0.4)
EOSINOPHIL NFR BLD AUTO: 0 % (ref 0.3–6.2)
ERYTHROCYTE [DISTWIDTH] IN BLOOD BY AUTOMATED COUNT: 14.6 % (ref 12.3–15.4)
GLOBULIN UR ELPH-MCNC: 3.3 GM/DL
GLUCOSE SERPL-MCNC: 138 MG/DL (ref 65–99)
HCT VFR BLD AUTO: 36.5 % (ref 34–46.6)
HGB BLD-MCNC: 11.8 G/DL (ref 12–15.9)
IMM GRANULOCYTES # BLD AUTO: 0.02 10*3/MM3 (ref 0–0.05)
IMM GRANULOCYTES NFR BLD AUTO: 0.3 % (ref 0–0.5)
LYMPHOCYTES # BLD AUTO: 0.48 10*3/MM3 (ref 0.7–3.1)
LYMPHOCYTES NFR BLD AUTO: 7.4 % (ref 19.6–45.3)
MCH RBC QN AUTO: 29.1 PG (ref 26.6–33)
MCHC RBC AUTO-ENTMCNC: 32.3 G/DL (ref 31.5–35.7)
MCV RBC AUTO: 89.9 FL (ref 79–97)
MONOCYTES # BLD AUTO: 0.03 10*3/MM3 (ref 0.1–0.9)
MONOCYTES NFR BLD AUTO: 0.5 % (ref 5–12)
NEUTROPHILS NFR BLD AUTO: 5.91 10*3/MM3 (ref 1.7–7)
NEUTROPHILS NFR BLD AUTO: 91.6 % (ref 42.7–76)
NRBC BLD AUTO-RTO: 0 /100 WBC (ref 0–0.2)
PLATELET # BLD AUTO: 390 10*3/MM3 (ref 140–450)
PMV BLD AUTO: 10.1 FL (ref 6–12)
POTASSIUM SERPL-SCNC: 3.9 MMOL/L (ref 3.5–5.2)
PROT SERPL-MCNC: 7.5 G/DL (ref 6–8.5)
RBC # BLD AUTO: 4.06 10*6/MM3 (ref 3.77–5.28)
SODIUM SERPL-SCNC: 139 MMOL/L (ref 136–145)
WBC NRBC COR # BLD: 6.45 10*3/MM3 (ref 3.4–10.8)

## 2022-02-28 PROCEDURE — 25010000002 HEPARIN (PORCINE) 2000-0.9 UNIT/L-% SOLUTION: Performed by: INTERNAL MEDICINE

## 2022-02-28 PROCEDURE — 99152 MOD SED SAME PHYS/QHP 5/>YRS: CPT | Performed by: INTERNAL MEDICINE

## 2022-02-28 PROCEDURE — C1769 GUIDE WIRE: HCPCS | Performed by: INTERNAL MEDICINE

## 2022-02-28 PROCEDURE — 80053 COMPREHEN METABOLIC PANEL: CPT | Performed by: INTERNAL MEDICINE

## 2022-02-28 PROCEDURE — 93458 L HRT ARTERY/VENTRICLE ANGIO: CPT | Performed by: INTERNAL MEDICINE

## 2022-02-28 PROCEDURE — 25010000002 FENTANYL CITRATE (PF) 50 MCG/ML SOLUTION: Performed by: INTERNAL MEDICINE

## 2022-02-28 PROCEDURE — 25010000002 DIPHENHYDRAMINE PER 50 MG: Performed by: INTERNAL MEDICINE

## 2022-02-28 PROCEDURE — 85025 COMPLETE CBC W/AUTO DIFF WBC: CPT | Performed by: INTERNAL MEDICINE

## 2022-02-28 PROCEDURE — C1760 CLOSURE DEV, VASC: HCPCS | Performed by: INTERNAL MEDICINE

## 2022-02-28 PROCEDURE — 25010000002 MIDAZOLAM PER 1 MG: Performed by: INTERNAL MEDICINE

## 2022-02-28 PROCEDURE — 0 IOPAMIDOL PER 1 ML: Performed by: INTERNAL MEDICINE

## 2022-02-28 PROCEDURE — C1894 INTRO/SHEATH, NON-LASER: HCPCS | Performed by: INTERNAL MEDICINE

## 2022-02-28 PROCEDURE — S0260 H&P FOR SURGERY: HCPCS | Performed by: INTERNAL MEDICINE

## 2022-02-28 PROCEDURE — 25010000002 METHYLPREDNISOLONE PER 125 MG

## 2022-02-28 RX ORDER — METHYLPREDNISOLONE SODIUM SUCCINATE 125 MG/2ML
INJECTION, POWDER, LYOPHILIZED, FOR SOLUTION INTRAMUSCULAR; INTRAVENOUS
Status: COMPLETED
Start: 2022-02-28 | End: 2022-02-28

## 2022-02-28 RX ORDER — SODIUM CHLORIDE 9 MG/ML
125 INJECTION, SOLUTION INTRAVENOUS CONTINUOUS
Status: DISPENSED | OUTPATIENT
Start: 2022-02-28 | End: 2022-02-28

## 2022-02-28 RX ORDER — HEPARIN SODIUM 200 [USP'U]/100ML
INJECTION, SOLUTION INTRAVENOUS AS NEEDED
Status: DISCONTINUED | OUTPATIENT
Start: 2022-02-28 | End: 2022-02-28 | Stop reason: HOSPADM

## 2022-02-28 RX ORDER — METHYLPREDNISOLONE SODIUM SUCCINATE 125 MG/2ML
125 INJECTION, POWDER, LYOPHILIZED, FOR SOLUTION INTRAMUSCULAR; INTRAVENOUS ONCE
Status: COMPLETED | OUTPATIENT
Start: 2022-02-28 | End: 2022-02-28

## 2022-02-28 RX ORDER — DIPHENHYDRAMINE HYDROCHLORIDE 50 MG/ML
INJECTION INTRAMUSCULAR; INTRAVENOUS AS NEEDED
Status: DISCONTINUED | OUTPATIENT
Start: 2022-02-28 | End: 2022-02-28 | Stop reason: HOSPADM

## 2022-02-28 RX ORDER — MIDAZOLAM HYDROCHLORIDE 1 MG/ML
INJECTION INTRAMUSCULAR; INTRAVENOUS AS NEEDED
Status: DISCONTINUED | OUTPATIENT
Start: 2022-02-28 | End: 2022-02-28 | Stop reason: HOSPADM

## 2022-02-28 RX ORDER — LIDOCAINE HYDROCHLORIDE 10 MG/ML
0.1 INJECTION, SOLUTION EPIDURAL; INFILTRATION; INTRACAUDAL; PERINEURAL ONCE AS NEEDED
Status: DISCONTINUED | OUTPATIENT
Start: 2022-02-28 | End: 2022-02-28 | Stop reason: HOSPADM

## 2022-02-28 RX ORDER — FENTANYL CITRATE 50 UG/ML
INJECTION, SOLUTION INTRAMUSCULAR; INTRAVENOUS AS NEEDED
Status: DISCONTINUED | OUTPATIENT
Start: 2022-02-28 | End: 2022-02-28 | Stop reason: HOSPADM

## 2022-02-28 RX ORDER — ACETAMINOPHEN 325 MG/1
650 TABLET ORAL EVERY 4 HOURS PRN
Status: DISCONTINUED | OUTPATIENT
Start: 2022-02-28 | End: 2022-02-28 | Stop reason: HOSPADM

## 2022-02-28 RX ORDER — DIPHENHYDRAMINE HCL 25 MG
25 CAPSULE ORAL ONCE
Status: DISCONTINUED | OUTPATIENT
Start: 2022-02-28 | End: 2022-02-28 | Stop reason: HOSPADM

## 2022-02-28 RX ORDER — LIDOCAINE HYDROCHLORIDE 20 MG/ML
INJECTION, SOLUTION INFILTRATION; PERINEURAL AS NEEDED
Status: DISCONTINUED | OUTPATIENT
Start: 2022-02-28 | End: 2022-02-28 | Stop reason: HOSPADM

## 2022-02-28 RX ORDER — SODIUM CHLORIDE 9 MG/ML
125 INJECTION, SOLUTION INTRAVENOUS ONCE
Status: COMPLETED | OUTPATIENT
Start: 2022-02-28 | End: 2022-02-28

## 2022-02-28 RX ADMIN — METHYLPREDNISOLONE SODIUM SUCCINATE 125 MG: 125 INJECTION, POWDER, FOR SOLUTION INTRAMUSCULAR; INTRAVENOUS at 13:06

## 2022-02-28 RX ADMIN — SODIUM CHLORIDE 125 ML/HR: 9 INJECTION, SOLUTION INTRAVENOUS at 12:00

## 2022-02-28 RX ADMIN — METHYLPREDNISOLONE SODIUM SUCCINATE 125 MG: 125 INJECTION, POWDER, LYOPHILIZED, FOR SOLUTION INTRAMUSCULAR; INTRAVENOUS at 13:06

## 2022-03-01 ENCOUNTER — APPOINTMENT (OUTPATIENT)
Dept: CT IMAGING | Facility: HOSPITAL | Age: 62
End: 2022-03-01

## 2022-03-01 ENCOUNTER — HOSPITAL ENCOUNTER (EMERGENCY)
Facility: HOSPITAL | Age: 62
Discharge: HOME | End: 2022-03-01
Attending: FAMILY MEDICINE | Admitting: FAMILY MEDICINE

## 2022-03-01 VITALS
RESPIRATION RATE: 18 BRPM | OXYGEN SATURATION: 100 % | SYSTOLIC BLOOD PRESSURE: 187 MMHG | HEIGHT: 66 IN | WEIGHT: 125 LBS | BODY MASS INDEX: 20.09 KG/M2 | DIASTOLIC BLOOD PRESSURE: 96 MMHG | TEMPERATURE: 98 F | HEART RATE: 70 BPM

## 2022-03-01 DIAGNOSIS — I63.9 CEREBROVASCULAR ACCIDENT (CVA), UNSPECIFIED MECHANISM: Primary | ICD-10-CM

## 2022-03-01 LAB
ALBUMIN SERPL-MCNC: 4.3 G/DL (ref 3.5–5.2)
ALBUMIN/GLOB SERPL: 1.3 G/DL
ALP SERPL-CCNC: 93 U/L (ref 39–117)
ALT SERPL W P-5'-P-CCNC: 5 U/L (ref 1–33)
ANION GAP SERPL CALCULATED.3IONS-SCNC: 10 MMOL/L (ref 5–15)
APTT PPP: 27.9 SECONDS (ref 24.1–35)
AST SERPL-CCNC: 10 U/L (ref 1–32)
BASOPHILS # BLD AUTO: 0.04 10*3/MM3 (ref 0–0.2)
BASOPHILS NFR BLD AUTO: 0.2 % (ref 0–1.5)
BILIRUB SERPL-MCNC: 0.2 MG/DL (ref 0–1.2)
BUN SERPL-MCNC: 16 MG/DL (ref 8–23)
BUN/CREAT SERPL: 15.4 (ref 7–25)
CALCIUM SPEC-SCNC: 9.2 MG/DL (ref 8.6–10.5)
CHLORIDE SERPL-SCNC: 101 MMOL/L (ref 98–107)
CO2 SERPL-SCNC: 29 MMOL/L (ref 22–29)
CREAT SERPL-MCNC: 1.04 MG/DL (ref 0.57–1)
DEPRECATED RDW RBC AUTO: 49.1 FL (ref 37–54)
EGFRCR SERPLBLD CKD-EPI 2021: 60.5 ML/MIN/1.73
EOSINOPHIL # BLD AUTO: 0.01 10*3/MM3 (ref 0–0.4)
EOSINOPHIL NFR BLD AUTO: 0 % (ref 0.3–6.2)
ERYTHROCYTE [DISTWIDTH] IN BLOOD BY AUTOMATED COUNT: 14.7 % (ref 12.3–15.4)
GLOBULIN UR ELPH-MCNC: 3.2 GM/DL
GLUCOSE SERPL-MCNC: 94 MG/DL (ref 65–99)
HCT VFR BLD AUTO: 37.5 % (ref 34–46.6)
HGB BLD-MCNC: 12 G/DL (ref 12–15.9)
HOLD SPECIMEN: NORMAL
IMM GRANULOCYTES # BLD AUTO: 0.1 10*3/MM3 (ref 0–0.05)
IMM GRANULOCYTES NFR BLD AUTO: 0.5 % (ref 0–0.5)
INR PPP: 1.15 (ref 0.91–1.09)
LYMPHOCYTES # BLD AUTO: 3.83 10*3/MM3 (ref 0.7–3.1)
LYMPHOCYTES NFR BLD AUTO: 18.7 % (ref 19.6–45.3)
MAGNESIUM SERPL-MCNC: 2.1 MG/DL (ref 1.6–2.4)
MCH RBC QN AUTO: 28.9 PG (ref 26.6–33)
MCHC RBC AUTO-ENTMCNC: 32 G/DL (ref 31.5–35.7)
MCV RBC AUTO: 90.4 FL (ref 79–97)
MONOCYTES # BLD AUTO: 1.2 10*3/MM3 (ref 0.1–0.9)
MONOCYTES NFR BLD AUTO: 5.9 % (ref 5–12)
NEUTROPHILS NFR BLD AUTO: 15.33 10*3/MM3 (ref 1.7–7)
NEUTROPHILS NFR BLD AUTO: 74.7 % (ref 42.7–76)
NRBC BLD AUTO-RTO: 0 /100 WBC (ref 0–0.2)
PLATELET # BLD AUTO: 440 10*3/MM3 (ref 140–450)
PMV BLD AUTO: 10.2 FL (ref 6–12)
POTASSIUM SERPL-SCNC: 3.2 MMOL/L (ref 3.5–5.2)
PROT SERPL-MCNC: 7.5 G/DL (ref 6–8.5)
PROTHROMBIN TIME: 14.3 SECONDS (ref 11.9–14.6)
RBC # BLD AUTO: 4.15 10*6/MM3 (ref 3.77–5.28)
SODIUM SERPL-SCNC: 140 MMOL/L (ref 136–145)
WBC NRBC COR # BLD: 20.51 10*3/MM3 (ref 3.4–10.8)

## 2022-03-01 PROCEDURE — 99283 EMERGENCY DEPT VISIT LOW MDM: CPT | Performed by: CLINICAL NURSE SPECIALIST

## 2022-03-01 PROCEDURE — 99284 EMERGENCY DEPT VISIT MOD MDM: CPT

## 2022-03-01 PROCEDURE — 70450 CT HEAD/BRAIN W/O DYE: CPT

## 2022-03-01 PROCEDURE — 83735 ASSAY OF MAGNESIUM: CPT | Performed by: CLINICAL NURSE SPECIALIST

## 2022-03-01 PROCEDURE — 85610 PROTHROMBIN TIME: CPT | Performed by: FAMILY MEDICINE

## 2022-03-01 PROCEDURE — 36415 COLL VENOUS BLD VENIPUNCTURE: CPT

## 2022-03-01 PROCEDURE — 85730 THROMBOPLASTIN TIME PARTIAL: CPT | Performed by: FAMILY MEDICINE

## 2022-03-01 PROCEDURE — 85025 COMPLETE CBC W/AUTO DIFF WBC: CPT | Performed by: FAMILY MEDICINE

## 2022-03-01 PROCEDURE — 80053 COMPREHEN METABOLIC PANEL: CPT | Performed by: FAMILY MEDICINE

## 2022-03-01 RX ORDER — POTASSIUM CHLORIDE 750 MG/1
40 CAPSULE, EXTENDED RELEASE ORAL ONCE
Status: COMPLETED | OUTPATIENT
Start: 2022-03-01 | End: 2022-03-01

## 2022-03-01 RX ORDER — CLONIDINE HYDROCHLORIDE 0.1 MG/1
0.1 TABLET ORAL ONCE
Status: COMPLETED | OUTPATIENT
Start: 2022-03-01 | End: 2022-03-01

## 2022-03-01 RX ADMIN — POTASSIUM CHLORIDE 40 MEQ: 750 CAPSULE, EXTENDED RELEASE ORAL at 15:49

## 2022-03-01 RX ADMIN — CLONIDINE HYDROCHLORIDE 0.1 MG: 0.1 TABLET ORAL at 17:48
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
## 2022-03-07 ENCOUNTER — HOSPITAL ENCOUNTER (OUTPATIENT)
Dept: CARDIOLOGY | Facility: HOSPITAL | Age: 62
Discharge: HOME | End: 2022-03-07
Admitting: INTERNAL MEDICINE

## 2022-03-07 ENCOUNTER — TELEPHONE (OUTPATIENT)
Dept: CARDIOLOGY | Facility: CLINIC | Age: 62
End: 2022-03-07

## 2022-03-07 VITALS
HEIGHT: 66 IN | DIASTOLIC BLOOD PRESSURE: 73 MMHG | WEIGHT: 125 LBS | BODY MASS INDEX: 20.09 KG/M2 | SYSTOLIC BLOOD PRESSURE: 127 MMHG

## 2022-03-07 DIAGNOSIS — I63.10 CEREBROVASCULAR ACCIDENT (CVA) DUE TO EMBOLISM OF PRECEREBRAL ARTERY: ICD-10-CM

## 2022-03-07 LAB
BH CV ECHO MEAS - AO MAX PG (FULL): 11.7 MMHG
BH CV ECHO MEAS - AO MAX PG: 18 MMHG
BH CV ECHO MEAS - AO MEAN PG (FULL): 8 MMHG
BH CV ECHO MEAS - AO MEAN PG: 11 MMHG
BH CV ECHO MEAS - AO ROOT AREA (BSA CORRECTED): 2
BH CV ECHO MEAS - AO ROOT AREA: 8.6 CM^2
BH CV ECHO MEAS - AO ROOT DIAM: 3.3 CM
BH CV ECHO MEAS - AO V2 MAX: 212 CM/SEC
BH CV ECHO MEAS - AO V2 MEAN: 159 CM/SEC
BH CV ECHO MEAS - AO V2 VTI: 55.9 CM
BH CV ECHO MEAS - AVA(I,A): 1.7 CM^2
BH CV ECHO MEAS - AVA(I,D): 1.7 CM^2
BH CV ECHO MEAS - AVA(V,A): 1.9 CM^2
BH CV ECHO MEAS - AVA(V,D): 1.9 CM^2
BH CV ECHO MEAS - BSA(HAYCOCK): 1.6 M^2
BH CV ECHO MEAS - BSA: 1.6 M^2
BH CV ECHO MEAS - BZI_BMI: 20.8 KILOGRAMS/M^2
BH CV ECHO MEAS - BZI_METRIC_HEIGHT: 165.1 CM
BH CV ECHO MEAS - BZI_METRIC_WEIGHT: 56.7 KG
BH CV ECHO MEAS - EDV(CUBED): 99.3 ML
BH CV ECHO MEAS - EDV(MOD-SP4): 143 ML
BH CV ECHO MEAS - EDV(TEICH): 98.8 ML
BH CV ECHO MEAS - EF(CUBED): 76.4 %
BH CV ECHO MEAS - EF(MOD-SP4): 65.2 %
BH CV ECHO MEAS - EF(TEICH): 68.5 %
BH CV ECHO MEAS - ESV(CUBED): 23.4 ML
BH CV ECHO MEAS - ESV(MOD-SP4): 49.7 ML
BH CV ECHO MEAS - ESV(TEICH): 31.1 ML
BH CV ECHO MEAS - FS: 38.2 %
BH CV ECHO MEAS - IVS/LVPW: 0.93
BH CV ECHO MEAS - IVSD: 1.2 CM
BH CV ECHO MEAS - LA DIMENSION: 3.6 CM
BH CV ECHO MEAS - LA/AO: 1.1
BH CV ECHO MEAS - LAT PEAK E' VEL: 7.5 CM/SEC
BH CV ECHO MEAS - LV DIASTOLIC VOL/BSA (35-75): 88.3 ML/M^2
BH CV ECHO MEAS - LV MASS(C)D: 204.6 GRAMS
BH CV ECHO MEAS - LV MASS(C)DI: 126.3 GRAMS/M^2
BH CV ECHO MEAS - LV MAX PG: 6.3 MMHG
BH CV ECHO MEAS - LV MEAN PG: 3 MMHG
BH CV ECHO MEAS - LV SYSTOLIC VOL/BSA (12-30): 30.7 ML/M^2
BH CV ECHO MEAS - LV V1 MAX: 125 CM/SEC
BH CV ECHO MEAS - LV V1 MEAN: 81.7 CM/SEC
BH CV ECHO MEAS - LV V1 VTI: 30.3 CM
BH CV ECHO MEAS - LVIDD: 4.6 CM
BH CV ECHO MEAS - LVIDS: 2.9 CM
BH CV ECHO MEAS - LVLD AP4: 7.8 CM
BH CV ECHO MEAS - LVLS AP4: 6.5 CM
BH CV ECHO MEAS - LVOT AREA (M): 3.1 CM^2
BH CV ECHO MEAS - LVOT AREA: 3.1 CM^2
BH CV ECHO MEAS - LVOT DIAM: 2 CM
BH CV ECHO MEAS - LVPWD: 1.2 CM
BH CV ECHO MEAS - MED PEAK E' VEL: 5.7 CM/SEC
BH CV ECHO MEAS - MR MAX PG: 70.6 MMHG
BH CV ECHO MEAS - MR MAX VEL: 420 CM/SEC
BH CV ECHO MEAS - MR MEAN PG: 56 MMHG
BH CV ECHO MEAS - MR MEAN VEL: 364 CM/SEC
BH CV ECHO MEAS - MR VTI: 149 CM
BH CV ECHO MEAS - MV A MAX VEL: 68.9 CM/SEC
BH CV ECHO MEAS - MV DEC SLOPE: 276 CM/SEC^2
BH CV ECHO MEAS - MV DEC TIME: 0.28 SEC
BH CV ECHO MEAS - MV E MAX VEL: 77.6 CM/SEC
BH CV ECHO MEAS - MV E/A: 1.1
BH CV ECHO MEAS - RAP SYSTOLE: 10 MMHG
BH CV ECHO MEAS - RVSP: 26 MMHG
BH CV ECHO MEAS - SI(AO): 295.1 ML/M^2
BH CV ECHO MEAS - SI(CUBED): 46.8 ML/M^2
BH CV ECHO MEAS - SI(LVOT): 58.8 ML/M^2
BH CV ECHO MEAS - SI(MOD-SP4): 57.6 ML/M^2
BH CV ECHO MEAS - SI(TEICH): 41.8 ML/M^2
BH CV ECHO MEAS - SV(AO): 478.1 ML
BH CV ECHO MEAS - SV(CUBED): 75.9 ML
BH CV ECHO MEAS - SV(LVOT): 95.2 ML
BH CV ECHO MEAS - SV(MOD-SP4): 93.3 ML
BH CV ECHO MEAS - SV(TEICH): 67.7 ML
BH CV ECHO MEAS - TR MAX VEL: 200 CM/SEC
BH CV ECHO MEASUREMENTS AVERAGE E/E' RATIO: 11.76
LEFT ATRIUM VOLUME INDEX: 26.4 ML/M2
LEFT ATRIUM VOLUME: 42.8 CM3
LV EF 2D ECHO EST: 65 %
MAXIMAL PREDICTED HEART RATE: 157 BPM
STRESS TARGET HR: 133 BPM

## 2022-03-07 PROCEDURE — 93306 TTE W/DOPPLER COMPLETE: CPT

## 2022-03-07 PROCEDURE — 93306 TTE W/DOPPLER COMPLETE: CPT | Performed by: INTERNAL MEDICINE

## 2022-03-07 PROCEDURE — 25010000002 PERFLUTREN 6.52 MG/ML SUSPENSION: Performed by: INTERNAL MEDICINE

## 2022-03-07 RX ADMIN — PERFLUTREN 8.48 MG: 6.52 INJECTION, SUSPENSION INTRAVENOUS at 10:15

## 2022-03-07 NOTE — TELEPHONE ENCOUNTER
Attempted to reach the patient at both of her phone numbers with no answer on her cell and a message is left @ her home # to call us back to schedule.  Elisabeth Dickens MA

## 2022-03-07 NOTE — TELEPHONE ENCOUNTER
----- Message from Sary Dickisnon RN sent at 3/7/2022  1:26 PM CST -----  Regarding: f/u visit  Per Dr. Clark, please contact patient and schedule f/u w/Jossie ASAP this week or the 1st of next week.  He took her for cath 2/28 and reported non-obstructive CVA.  It appears she was in the ER the following day and diagnosed with a CVA but refused to stay.  She was here today for a 2D that was ordered in February.  He just read her report and saw that she was in the ER and has asked to have her added to the schedule.  Please notify the patient.    HV

## 2022-03-08 ENCOUNTER — TELEPHONE (OUTPATIENT)
Dept: CARDIOLOGY | Facility: CLINIC | Age: 62
End: 2022-03-08

## 2022-03-08 NOTE — TELEPHONE ENCOUNTER
----- Message from Sary Dickinson RN sent at 3/7/2022  1:26 PM CST -----  Regarding: f/u visit  Per Dr. Clark, please contact patient and schedule f/u w/Jossie ASAP this week or the 1st of next week.  He took her for cath 2/28 and reported non-obstructive CVA.  It appears she was in the ER the following day and diagnosed with a CVA but refused to stay.  She was here today for a 2D that was ordered in February.  He just read her report and saw that she was in the ER and has asked to have her added to the schedule.  Please notify the patient.    HV

## 2022-03-09 ENCOUNTER — TRANSCRIBE ORDERS (OUTPATIENT)
Dept: ADMINISTRATIVE | Facility: HOSPITAL | Age: 62
End: 2022-03-09

## 2022-03-09 ENCOUNTER — OFFICE VISIT (OUTPATIENT)
Dept: CARDIOLOGY | Facility: CLINIC | Age: 62
End: 2022-03-09

## 2022-03-09 VITALS
HEART RATE: 71 BPM | SYSTOLIC BLOOD PRESSURE: 122 MMHG | WEIGHT: 127 LBS | HEIGHT: 66 IN | BODY MASS INDEX: 20.41 KG/M2 | DIASTOLIC BLOOD PRESSURE: 66 MMHG | OXYGEN SATURATION: 98 %

## 2022-03-09 DIAGNOSIS — F17.201 TOBACCO USE DISORDER, MILD, IN EARLY REMISSION: ICD-10-CM

## 2022-03-09 DIAGNOSIS — E78.2 MIXED HYPERLIPIDEMIA: ICD-10-CM

## 2022-03-09 DIAGNOSIS — R23.8 ABNORMAL SKIN COLOR: Primary | ICD-10-CM

## 2022-03-09 DIAGNOSIS — Q21.12 PFO (PATENT FORAMEN OVALE): ICD-10-CM

## 2022-03-09 DIAGNOSIS — I25.10 CORONARY ARTERY DISEASE INVOLVING NATIVE CORONARY ARTERY OF NATIVE HEART WITHOUT ANGINA PECTORIS: ICD-10-CM

## 2022-03-09 DIAGNOSIS — I10 PRIMARY HYPERTENSION: ICD-10-CM

## 2022-03-09 DIAGNOSIS — Z91.199 NONCOMPLIANCE: ICD-10-CM

## 2022-03-09 DIAGNOSIS — I63.10 CEREBROVASCULAR ACCIDENT (CVA) DUE TO EMBOLISM OF PRECEREBRAL ARTERY: Primary | ICD-10-CM

## 2022-03-09 DIAGNOSIS — I48.0 PAROXYSMAL ATRIAL FIBRILLATION: ICD-10-CM

## 2022-03-09 DIAGNOSIS — Z95.5 HISTORY OF CORONARY ARTERY STENT PLACEMENT: ICD-10-CM

## 2022-03-09 PROCEDURE — 93000 ELECTROCARDIOGRAM COMPLETE: CPT | Performed by: NURSE PRACTITIONER

## 2022-03-09 PROCEDURE — 99214 OFFICE O/P EST MOD 30 MIN: CPT | Performed by: NURSE PRACTITIONER
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
## 2022-03-14 ENCOUNTER — HOSPITAL ENCOUNTER (OUTPATIENT)
Dept: ULTRASOUND IMAGING | Facility: HOSPITAL | Age: 62
Discharge: HOME | End: 2022-03-14

## 2022-03-14 DIAGNOSIS — I63.10 CEREBROVASCULAR ACCIDENT (CVA) DUE TO EMBOLISM OF PRECEREBRAL ARTERY: ICD-10-CM

## 2022-03-14 PROCEDURE — 93970 EXTREMITY STUDY: CPT

## 2022-03-14 PROCEDURE — 93924 LWR XTR VASC STDY BILAT: CPT

## 2022-03-15 ENCOUNTER — TELEPHONE (OUTPATIENT)
Dept: CARDIOLOGY | Facility: CLINIC | Age: 62
End: 2022-03-15

## 2022-03-15 NOTE — TELEPHONE ENCOUNTER
The patient is called and notified of the result of the doppler venous u/s of her legs and reminded as to her upcoming appointments.  She voices understanding.  Elisabeth Dickens MA

## 2022-03-15 NOTE — TELEPHONE ENCOUNTER
----- Message from JOSEFINA Crump sent at 3/15/2022 11:10 AM CDT -----  Please let patient know there was no evidence of a blood clot in her legs that could have caused her stroke. She needs to keep her appt with Dr. Clark in April as well as neurology tomorrow.

## 2022-03-16 ENCOUNTER — DOCUMENTATION (OUTPATIENT)
Dept: NEUROLOGY | Facility: CLINIC | Age: 62
End: 2022-03-16

## 2022-03-16 ENCOUNTER — LAB (OUTPATIENT)
Dept: LAB | Facility: HOSPITAL | Age: 62
End: 2022-03-16

## 2022-03-16 ENCOUNTER — OFFICE VISIT (OUTPATIENT)
Dept: NEUROLOGY | Facility: CLINIC | Age: 62
End: 2022-03-16

## 2022-03-16 VITALS
RESPIRATION RATE: 18 BRPM | WEIGHT: 128 LBS | DIASTOLIC BLOOD PRESSURE: 62 MMHG | HEIGHT: 66 IN | BODY MASS INDEX: 20.57 KG/M2 | HEART RATE: 70 BPM | SYSTOLIC BLOOD PRESSURE: 100 MMHG

## 2022-03-16 DIAGNOSIS — H57.02 ANISOCORIA: ICD-10-CM

## 2022-03-16 DIAGNOSIS — I69.30 CHRONIC ARTERIAL ISCHEMIC STROKE: Primary | ICD-10-CM

## 2022-03-16 DIAGNOSIS — I69.30 SEQUELAE, POST-STROKE: ICD-10-CM

## 2022-03-16 DIAGNOSIS — I48.0 PAROXYSMAL ATRIAL FIBRILLATION: ICD-10-CM

## 2022-03-16 DIAGNOSIS — E78.2 MIXED HYPERLIPIDEMIA: ICD-10-CM

## 2022-03-16 DIAGNOSIS — I63.10 CEREBROVASCULAR ACCIDENT (CVA) DUE TO EMBOLISM OF PRECEREBRAL ARTERY: ICD-10-CM

## 2022-03-16 DIAGNOSIS — R13.10 DYSPHAGIA, UNSPECIFIED TYPE: ICD-10-CM

## 2022-03-16 DIAGNOSIS — Q21.12 PFO (PATENT FORAMEN OVALE): ICD-10-CM

## 2022-03-16 DIAGNOSIS — I69.30 CHRONIC ARTERIAL ISCHEMIC STROKE: ICD-10-CM

## 2022-03-16 PROCEDURE — 36415 COLL VENOUS BLD VENIPUNCTURE: CPT

## 2022-03-16 PROCEDURE — 84443 ASSAY THYROID STIM HORMONE: CPT

## 2022-03-16 PROCEDURE — 80061 LIPID PANEL: CPT

## 2022-03-16 PROCEDURE — 83036 HEMOGLOBIN GLYCOSYLATED A1C: CPT

## 2022-03-16 PROCEDURE — 99214 OFFICE O/P EST MOD 30 MIN: CPT | Performed by: CLINICAL NURSE SPECIALIST

## 2022-03-16 RX ORDER — ATORVASTATIN CALCIUM 80 MG/1
80 TABLET, FILM COATED ORAL DAILY
Qty: 30 TABLET | Refills: 5 | Status: SHIPPED | OUTPATIENT
Start: 2022-03-16 | End: 2022-09-30
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
## 2022-03-16 NOTE — PROGRESS NOTES
I did speak with the patient at Dominique Lombardi's request in regards to getting a MRA of the brain.  Patient is in agreement.

## 2022-03-17 LAB
CHOLEST SERPL-MCNC: 158 MG/DL (ref 0–200)
HBA1C MFR BLD: 5.4 % (ref 4.8–5.6)
HDLC SERPL-MCNC: 46 MG/DL (ref 40–60)
LDLC SERPL CALC-MCNC: 88 MG/DL (ref 0–100)
LDLC/HDLC SERPL: 1.84 {RATIO}
TRIGL SERPL-MCNC: 136 MG/DL (ref 0–150)
TSH SERPL DL<=0.05 MIU/L-ACNC: 2.33 UIU/ML (ref 0.27–4.2)
VLDLC SERPL-MCNC: 24 MG/DL (ref 5–40)

## 2022-03-22 ENCOUNTER — OFFICE VISIT (OUTPATIENT)
Dept: PHYSICAL THERAPY | Facility: CLINIC | Age: 62
End: 2022-03-22

## 2022-03-22 DIAGNOSIS — I69.391 DYSPHAGIA AS LATE EFFECT OF STROKE: Primary | ICD-10-CM

## 2022-03-22 DIAGNOSIS — R47.1 DYSARTHRIA: ICD-10-CM

## 2022-03-22 PROCEDURE — 92522 EVALUATE SPEECH PRODUCTION: CPT

## 2022-03-22 PROCEDURE — 92610 EVALUATE SWALLOWING FUNCTION: CPT
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
## 2022-03-25 DIAGNOSIS — R13.10 DYSPHAGIA, UNSPECIFIED TYPE: Primary | ICD-10-CM

## 2022-03-29 ENCOUNTER — HOSPITAL ENCOUNTER (OUTPATIENT)
Dept: MRI IMAGING | Facility: HOSPITAL | Age: 62
Discharge: HOME | End: 2022-03-29
Admitting: CLINICAL NURSE SPECIALIST

## 2022-03-29 DIAGNOSIS — H57.02 ANISOCORIA: ICD-10-CM

## 2022-03-29 PROCEDURE — 70544 MR ANGIOGRAPHY HEAD W/O DYE: CPT

## 2022-03-31 ENCOUNTER — DOCUMENTATION (OUTPATIENT)
Dept: NEUROLOGY | Facility: CLINIC | Age: 62
End: 2022-03-31

## 2022-03-31 DIAGNOSIS — G47.33 OBSTRUCTIVE SLEEP APNEA: Primary | ICD-10-CM

## 2022-04-05 ENCOUNTER — LAB (OUTPATIENT)
Dept: LAB | Facility: HOSPITAL | Age: 62
End: 2022-04-05

## 2022-04-05 DIAGNOSIS — G47.33 OBSTRUCTIVE SLEEP APNEA: ICD-10-CM

## 2022-04-05 LAB — SARS-COV-2 ORF1AB RESP QL NAA+PROBE: NOT DETECTED

## 2022-04-05 PROCEDURE — C9803 HOPD COVID-19 SPEC COLLECT: HCPCS

## 2022-04-05 PROCEDURE — U0004 COV-19 TEST NON-CDC HGH THRU: HCPCS

## 2022-04-07 ENCOUNTER — HOSPITAL ENCOUNTER (OUTPATIENT)
Dept: GENERAL RADIOLOGY | Facility: HOSPITAL | Age: 62
Discharge: HOME | End: 2022-04-07
Admitting: CLINICAL NURSE SPECIALIST

## 2022-04-07 DIAGNOSIS — R13.10 DYSPHAGIA, UNSPECIFIED TYPE: ICD-10-CM

## 2022-04-07 PROCEDURE — 92611 MOTION FLUOROSCOPY/SWALLOW: CPT

## 2022-04-07 PROCEDURE — 74230 X-RAY XM SWLNG FUNCJ C+: CPT

## 2022-04-07 RX ADMIN — BARIUM SULFATE 20 ML: 400 SUSPENSION ORAL at 09:10

## 2022-04-07 RX ADMIN — BARIUM SULFATE 20 ML: 0.81 POWDER, FOR SUSPENSION ORAL at 09:10

## 2022-04-07 RX ADMIN — BARIUM SULFATE 20 ML: 400 PASTE ORAL at 09:10
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
## 2022-04-20 ENCOUNTER — TREATMENT (OUTPATIENT)
Dept: PHYSICAL THERAPY | Facility: CLINIC | Age: 62
End: 2022-04-20

## 2022-04-20 DIAGNOSIS — I69.391 DYSPHAGIA AS LATE EFFECT OF STROKE: Primary | ICD-10-CM

## 2022-04-20 DIAGNOSIS — R13.12 OROPHARYNGEAL DYSPHAGIA: ICD-10-CM

## 2022-04-20 PROCEDURE — 92526 ORAL FUNCTION THERAPY: CPT

## 2022-04-20 NOTE — PROGRESS NOTES
Speech Language Pathology 30 Day Progress Note    Patient: Ivy Ruff                                                                                     Visit Date: 2022  :     1960    Referring practitioner:    JOSEFINA Ozuna  Date of Initial Visit:          Type: THERAPY  Noted: 3/22/2022    Patient seen for 2 sessions    Visit Diagnoses:    ICD-10-CM ICD-9-CM   1. Dysphagia as late effect of stroke  I69.391 438.82   2. Oropharyngeal dysphagia  R13.12 787.22     SUBJECTIVE     Ivy was alert and ready to participate in therapy activities.     OBJECTIVE   GOALS  Goals                                            STG  Comments Status   Patient will improve oral skills to enhance safety and increase eating efficiency and bolus control as measured by improved bolus formation and improved management of secretions.    Patient was given effortful swallow and supraglottic swallow exercises and she demonstrated understanding how to perform them independently.   Ongoing  2022     Patient will increase base of the tongue strength and posterior pharyngeal wall excursion as measured by decreased overt signs and symptoms of aspiration and decreased penetration and aspiration on repeat instrumental swallow study, if applicable.      Patient was given tongue exercises and she demonstrated understanding how to complete them independently.   Ongoing  2022     Patient will report decreased difficulty with swallowing, decreased pain/discomfort with swallowing and improved EAT-10 score.  EAT 10 score: 16  Ongoing  2022             LTG        Patient will safely consume full PO diet of regular consistency food and thin liquids without difficulty or complications such as aspiration or pneumonia.   Patient reports that she is performing oral care 2-3 times a day and attempting to drink more water.  Ongoing  2022            Therapy Education/Self Care    Details: Swallowing exercises and education   Given Access Code: [email or web address]  URL: [email or web address]  Date: 04/20/2022  Prepared by: Imani Solis    Exercises  Effortful Swallow - 1 x daily - 7 x weekly - 3 sets - 10 reps  Supraglottic Swallow - 1 x daily - 7 x weekly - 3 sets - 10 reps  Tongue Resistance with Front of Tongue - 1 x daily - 7 x weekly - 3 sets - 10 reps  Tongue Resistance with Side of Tongue - 1 x daily - 7 x weekly - 3 sets - 10 reps     Progress: New   Education provided to:  Patient   Level of understanding Verbalized             Total Time of Visit:             45   mins         ASSESSMENT/PLAN     ASSESSMENT: Ivy demonstrated understanding how to perform swallow exercises independently. She verbalized that she is performing oral care 2-3 times a day and will continue to drink more water.     PLAN: continue therapy and perform exercises at home daily    Progress Note Due Date: 05/22/2022  Recertification Due Date: 06/19/2022    SIGNATURE: Imani Solis CCC-SLP, [place] License #: [id number]  Electronically Signed on 4/20/2022          [place] [address]  [phone]

## 2022-04-25 ENCOUNTER — OFFICE VISIT (OUTPATIENT)
Dept: CARDIOLOGY | Facility: CLINIC | Age: 62
End: 2022-04-25

## 2022-04-25 VITALS
HEIGHT: 66 IN | SYSTOLIC BLOOD PRESSURE: 144 MMHG | OXYGEN SATURATION: 98 % | WEIGHT: 131 LBS | HEART RATE: 61 BPM | BODY MASS INDEX: 21.05 KG/M2 | DIASTOLIC BLOOD PRESSURE: 80 MMHG

## 2022-04-25 DIAGNOSIS — E78.2 MIXED HYPERLIPIDEMIA: ICD-10-CM

## 2022-04-25 DIAGNOSIS — Q21.12 PFO (PATENT FORAMEN OVALE): ICD-10-CM

## 2022-04-25 DIAGNOSIS — I63.10 CEREBROVASCULAR ACCIDENT (CVA) DUE TO EMBOLISM OF PRECEREBRAL ARTERY: ICD-10-CM

## 2022-04-25 DIAGNOSIS — I25.10 CORONARY ARTERY DISEASE INVOLVING NATIVE CORONARY ARTERY OF NATIVE HEART WITHOUT ANGINA PECTORIS: ICD-10-CM

## 2022-04-25 DIAGNOSIS — I48.0 PAROXYSMAL ATRIAL FIBRILLATION: Primary | ICD-10-CM

## 2022-04-25 DIAGNOSIS — I10 PRIMARY HYPERTENSION: ICD-10-CM

## 2022-04-25 PROCEDURE — 93000 ELECTROCARDIOGRAM COMPLETE: CPT | Performed by: INTERNAL MEDICINE

## 2022-04-25 PROCEDURE — 99214 OFFICE O/P EST MOD 30 MIN: CPT | Performed by: INTERNAL MEDICINE

## 2022-04-25 NOTE — ASSESSMENT & PLAN NOTE
She has had 4 cryptogenic strokes in the past. She was found to have a large PFO on echo but no documented DVT. She is very concerned that she is going to have another stroke
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
## 2022-04-26 ENCOUNTER — TELEPHONE (OUTPATIENT)
Dept: CARDIOLOGY | Facility: CLINIC | Age: 62
End: 2022-04-26

## 2022-04-26 ENCOUNTER — TRANSCRIBE ORDERS (OUTPATIENT)
Dept: ADMINISTRATIVE | Facility: HOSPITAL | Age: 62
End: 2022-04-26

## 2022-04-26 DIAGNOSIS — N95.9 UNSPECIFIED MENOPAUSAL AND PERIMENOPAUSAL DISORDER: Primary | ICD-10-CM

## 2022-04-26 NOTE — TELEPHONE ENCOUNTER
I called pt to make sure she understood the heart monitor that was placed on her yesterday.  I had to leave a vm msg on both her home and mobile phone to call me back.  Sidney James, CMA

## 2022-06-22 ENCOUNTER — DOCUMENTATION (OUTPATIENT)
Dept: PHYSICAL THERAPY | Facility: CLINIC | Age: 62
End: 2022-06-22

## 2022-06-22 DIAGNOSIS — R13.12 OROPHARYNGEAL DYSPHAGIA: ICD-10-CM

## 2022-06-22 DIAGNOSIS — I69.391 DYSPHAGIA AS LATE EFFECT OF STROKE: Primary | ICD-10-CM

## 2022-06-22 NOTE — PROGRESS NOTES
Speech Language Pathology Discharge Note    Patient: Ivy Ruff                                                                        Date: 2022               :     1960    Referring practitioner:    No ref. provider found  Type/Date of Initial Visit:    Type: THERAPY  Noted: 3/22/2022    Patient seen for 2 sessions    Visit Diagnoses:    ICD-10-CM ICD-9-CM   1. Dysphagia as late effect of stroke  I69.391 438.82   2. Oropharyngeal dysphagia  R13.12 787.22     SUBJECTIVE     Documentation for discharge only today. Patient not seen for therapy today. Patient failed to arrive for remaining appointments Please see belowfor progress on goals. Date of last visit: 2022    OBJECTIVE   GOALS    Goals                                            STG  Comments Status   Patient will improve oral skills to enhance safety and increase eating efficiency and bolus control as measured by improved bolus formation and improved management of secretions.    Patient was given effortful swallow and supraglottic swallow exercises and she demonstrated understanding how to perform them independently.   Discontinue      Patient will increase base of the tongue strength and posterior pharyngeal wall excursion as measured by decreased overt signs and symptoms of aspiration and decreased penetration and aspiration on repeat instrumental swallow study, if applicable.      Patient was given tongue exercises and she demonstrated understanding how to complete them independently.   Discontinue      Patient will report decreased difficulty with swallowing, decreased pain/discomfort with swallowing and improved EAT-10 score.  EAT 10 score: 16  Discontinue              LTG        Patient will safely consume full PO diet of regular consistency food and thin liquids without difficulty or complications such as aspiration or pneumonia.   Patient reports that she is performing oral care 2-3 times a day and attempting to drink  more water.  Discontinue              ASSESSMENT/PLAN     DISCHARGE SUMMARY   Discharge date 6/22/2022   Dates of this episode 3/22/2022 through 6/22/2022   Number of visits on this episode 2   Reason for discharge patient did not return to therapy   Outcomes achieved Refer to the goals table for specifics on goals   Summary of care Patient demonstrated understanding of swallowing recommendations and exercises but failed to show up for therapy appointments.   Discharge plan Continue with current home exercise program as instructed     Thank you for this referral and for allowing us to participate in the care of this patient.       Signature: Imani Solis CCC-SLP  License # [id number]  Electronically signed on 6/22/2022

## 2022-07-06 ENCOUNTER — OFFICE VISIT (OUTPATIENT)
Dept: NEUROLOGY | Facility: CLINIC | Age: 62
End: 2022-07-06

## 2022-07-06 VITALS
SYSTOLIC BLOOD PRESSURE: 124 MMHG | OXYGEN SATURATION: 97 % | HEIGHT: 66 IN | HEART RATE: 76 BPM | WEIGHT: 128 LBS | DIASTOLIC BLOOD PRESSURE: 78 MMHG | RESPIRATION RATE: 17 BRPM | BODY MASS INDEX: 20.57 KG/M2

## 2022-07-06 DIAGNOSIS — I48.0 PAROXYSMAL ATRIAL FIBRILLATION: ICD-10-CM

## 2022-07-06 DIAGNOSIS — Q21.12 PFO (PATENT FORAMEN OVALE): ICD-10-CM

## 2022-07-06 DIAGNOSIS — I69.30 SEQUELAE, POST-STROKE: ICD-10-CM

## 2022-07-06 DIAGNOSIS — I69.30 CHRONIC ARTERIAL ISCHEMIC STROKE: Primary | ICD-10-CM

## 2022-07-06 DIAGNOSIS — E78.2 MIXED HYPERLIPIDEMIA: ICD-10-CM

## 2022-07-06 PROCEDURE — 99214 OFFICE O/P EST MOD 30 MIN: CPT | Performed by: NURSE PRACTITIONER
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
## 2022-07-21 ENCOUNTER — PREP FOR SURGERY (OUTPATIENT)
Dept: OTHER | Facility: HOSPITAL | Age: 62
End: 2022-07-21

## 2022-07-21 DIAGNOSIS — Q21.12 PFO (PATENT FORAMEN OVALE): Primary | ICD-10-CM

## 2022-07-21 RX ORDER — LIDOCAINE HYDROCHLORIDE 10 MG/ML
0.1 INJECTION, SOLUTION EPIDURAL; INFILTRATION; INTRACAUDAL; PERINEURAL ONCE AS NEEDED
Status: CANCELLED | OUTPATIENT
Start: 2022-07-21

## 2022-07-21 RX ORDER — SODIUM CHLORIDE 0.9 % (FLUSH) 0.9 %
10 SYRINGE (ML) INJECTION EVERY 12 HOURS SCHEDULED
Status: CANCELLED | OUTPATIENT
Start: 2022-07-21

## 2022-07-21 RX ORDER — SODIUM CHLORIDE 0.9 % (FLUSH) 0.9 %
10 SYRINGE (ML) INJECTION AS NEEDED
Status: CANCELLED | OUTPATIENT
Start: 2022-07-21

## 2022-07-27 ENCOUNTER — APPOINTMENT (OUTPATIENT)
Dept: CARDIOLOGY | Facility: HOSPITAL | Age: 62
End: 2022-07-27

## 2022-07-29 ENCOUNTER — HOSPITAL ENCOUNTER (OUTPATIENT)
Dept: CARDIOLOGY | Facility: HOSPITAL | Age: 62
End: 2022-07-29

## 2022-07-29 ENCOUNTER — APPOINTMENT (OUTPATIENT)
Dept: LAB | Facility: HOSPITAL | Age: 62
End: 2022-07-29

## 2022-07-29 ENCOUNTER — APPOINTMENT (OUTPATIENT)
Dept: CARDIOLOGY | Facility: HOSPITAL | Age: 62
End: 2022-07-29

## 2022-07-29 ENCOUNTER — TRANSCRIBE ORDERS (OUTPATIENT)
Dept: ADMINISTRATIVE | Facility: HOSPITAL | Age: 62
End: 2022-07-29

## 2022-07-29 ENCOUNTER — LAB (OUTPATIENT)
Dept: LAB | Facility: HOSPITAL | Age: 62
End: 2022-07-29

## 2022-07-29 DIAGNOSIS — Q21.12 PFO (PATENT FORAMEN OVALE): Primary | ICD-10-CM

## 2022-07-29 LAB — SARS-COV-2 ORF1AB RESP QL NAA+PROBE: NOT DETECTED

## 2022-07-29 PROCEDURE — U0004 COV-19 TEST NON-CDC HGH THRU: HCPCS | Performed by: INTERNAL MEDICINE

## 2022-08-01 ENCOUNTER — PREP FOR SURGERY (OUTPATIENT)
Dept: OTHER | Facility: HOSPITAL | Age: 62
End: 2022-08-01

## 2022-08-01 ENCOUNTER — HOSPITAL ENCOUNTER (OUTPATIENT)
Dept: CARDIOLOGY | Facility: HOSPITAL | Age: 62
Discharge: HOME | End: 2022-08-01
Admitting: INTERNAL MEDICINE

## 2022-08-01 VITALS
BODY MASS INDEX: 21.34 KG/M2 | SYSTOLIC BLOOD PRESSURE: 165 MMHG | OXYGEN SATURATION: 96 % | HEIGHT: 66 IN | TEMPERATURE: 98.6 F | WEIGHT: 132.8 LBS | HEART RATE: 52 BPM | RESPIRATION RATE: 22 BRPM | DIASTOLIC BLOOD PRESSURE: 84 MMHG

## 2022-08-01 DIAGNOSIS — I63.10 CEREBROVASCULAR ACCIDENT (CVA) DUE TO EMBOLISM OF PRECEREBRAL ARTERY: Primary | ICD-10-CM

## 2022-08-01 DIAGNOSIS — Q21.12 PFO (PATENT FORAMEN OVALE): ICD-10-CM

## 2022-08-01 PROCEDURE — 25010000002 FENTANYL CITRATE (PF) 50 MCG/ML SOLUTION: Performed by: INTERNAL MEDICINE

## 2022-08-01 PROCEDURE — 99234 HOSP IP/OBS SM DT SF/LOW 45: CPT | Performed by: INTERNAL MEDICINE

## 2022-08-01 PROCEDURE — 93312 ECHO TRANSESOPHAGEAL: CPT

## 2022-08-01 PROCEDURE — 25010000002 MIDAZOLAM PER 1 MG: Performed by: INTERNAL MEDICINE

## 2022-08-01 PROCEDURE — 93312 ECHO TRANSESOPHAGEAL: CPT | Performed by: INTERNAL MEDICINE

## 2022-08-01 RX ORDER — MIDAZOLAM HYDROCHLORIDE 1 MG/ML
INJECTION INTRAMUSCULAR; INTRAVENOUS
Status: COMPLETED | OUTPATIENT
Start: 2022-08-01 | End: 2022-08-01

## 2022-08-01 RX ORDER — SODIUM CHLORIDE 0.9 % (FLUSH) 0.9 %
10 SYRINGE (ML) INJECTION AS NEEDED
Status: DISCONTINUED | OUTPATIENT
Start: 2022-08-01 | End: 2022-08-02 | Stop reason: HOSPADM

## 2022-08-01 RX ORDER — SODIUM CHLORIDE 0.9 % (FLUSH) 0.9 %
10 SYRINGE (ML) INJECTION AS NEEDED
Status: CANCELLED | OUTPATIENT
Start: 2022-08-01

## 2022-08-01 RX ORDER — SODIUM CHLORIDE 0.9 % (FLUSH) 0.9 %
10 SYRINGE (ML) INJECTION EVERY 12 HOURS SCHEDULED
Status: CANCELLED | OUTPATIENT
Start: 2022-08-01

## 2022-08-01 RX ORDER — SODIUM CHLORIDE 0.9 % (FLUSH) 0.9 %
10 SYRINGE (ML) INJECTION EVERY 12 HOURS SCHEDULED
Status: DISCONTINUED | OUTPATIENT
Start: 2022-08-01 | End: 2022-08-02 | Stop reason: HOSPADM

## 2022-08-01 RX ORDER — FENTANYL CITRATE 50 UG/ML
INJECTION, SOLUTION INTRAMUSCULAR; INTRAVENOUS
Status: COMPLETED | OUTPATIENT
Start: 2022-08-01 | End: 2022-08-01

## 2022-08-01 RX ADMIN — MIDAZOLAM 2 MG: 1 INJECTION INTRAMUSCULAR; INTRAVENOUS at 13:32

## 2022-08-01 RX ADMIN — FENTANYL CITRATE 25 MCG: 50 INJECTION, SOLUTION INTRAMUSCULAR; INTRAVENOUS at 13:35

## 2022-08-01 RX ADMIN — FENTANYL CITRATE 50 MCG: 50 INJECTION, SOLUTION INTRAMUSCULAR; INTRAVENOUS at 13:31

## 2022-08-01 RX ADMIN — MIDAZOLAM 2 MG: 1 INJECTION INTRAMUSCULAR; INTRAVENOUS at 13:35

## 2022-08-01 RX ADMIN — MIDAZOLAM 3 MG: 1 INJECTION INTRAMUSCULAR; INTRAVENOUS at 13:31

## 2022-08-01 NOTE — PROCEDURES
CORTNEY aborted due to inability to safely sedate the patient. She became combative and uncooperative. Will reschedule with anesthesia
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
## 2022-08-10 LAB
MAXIMAL PREDICTED HEART RATE: 157 BPM
STRESS TARGET HR: 133 BPM

## 2022-08-12 ENCOUNTER — LAB (OUTPATIENT)
Dept: LAB | Facility: HOSPITAL | Age: 62
End: 2022-08-12

## 2022-08-12 ENCOUNTER — TRANSCRIBE ORDERS (OUTPATIENT)
Dept: ADMINISTRATIVE | Facility: HOSPITAL | Age: 62
End: 2022-08-12

## 2022-08-12 DIAGNOSIS — M79.641 PAIN IN RIGHT HAND: Primary | ICD-10-CM

## 2022-08-12 DIAGNOSIS — I63.10 CEREBROVASCULAR ACCIDENT (CVA) DUE TO EMBOLISM OF PRECEREBRAL ARTERY: ICD-10-CM

## 2022-08-12 LAB — SARS-COV-2 RNA PNL SPEC NAA+PROBE: NOT DETECTED

## 2022-08-12 PROCEDURE — 87635 SARS-COV-2 COVID-19 AMP PRB: CPT

## 2022-08-12 PROCEDURE — C9803 HOPD COVID-19 SPEC COLLECT: HCPCS

## 2022-08-14 ENCOUNTER — ANESTHESIA EVENT (OUTPATIENT)
Dept: CARDIOLOGY | Facility: HOSPITAL | Age: 62
End: 2022-08-14

## 2022-08-15 ENCOUNTER — HOSPITAL ENCOUNTER (OUTPATIENT)
Dept: CARDIOLOGY | Facility: HOSPITAL | Age: 62
Discharge: HOME | End: 2022-08-15

## 2022-08-15 ENCOUNTER — ANESTHESIA (OUTPATIENT)
Dept: CARDIOLOGY | Facility: HOSPITAL | Age: 62
End: 2022-08-15

## 2022-08-15 VITALS
WEIGHT: 130.6 LBS | RESPIRATION RATE: 20 BRPM | BODY MASS INDEX: 20.99 KG/M2 | HEIGHT: 66 IN | OXYGEN SATURATION: 99 % | HEART RATE: 71 BPM | DIASTOLIC BLOOD PRESSURE: 117 MMHG | TEMPERATURE: 97.6 F | SYSTOLIC BLOOD PRESSURE: 164 MMHG

## 2022-08-15 VITALS — SYSTOLIC BLOOD PRESSURE: 133 MMHG | OXYGEN SATURATION: 98 % | HEART RATE: 42 BPM | DIASTOLIC BLOOD PRESSURE: 58 MMHG

## 2022-08-15 DIAGNOSIS — I63.10 CEREBROVASCULAR ACCIDENT (CVA) DUE TO EMBOLISM OF PRECEREBRAL ARTERY: ICD-10-CM

## 2022-08-15 LAB
LV EF 2D ECHO EST: 60 %
MAXIMAL PREDICTED HEART RATE: 157 BPM
STRESS TARGET HR: 133 BPM

## 2022-08-15 PROCEDURE — 93325 DOPPLER ECHO COLOR FLOW MAPG: CPT

## 2022-08-15 PROCEDURE — 93320 DOPPLER ECHO COMPLETE: CPT

## 2022-08-15 PROCEDURE — 93312 ECHO TRANSESOPHAGEAL: CPT | Performed by: INTERNAL MEDICINE

## 2022-08-15 PROCEDURE — 25010000002 PROPOFOL 10 MG/ML EMULSION: Performed by: NURSE ANESTHETIST, CERTIFIED REGISTERED

## 2022-08-15 PROCEDURE — 93320 DOPPLER ECHO COMPLETE: CPT | Performed by: INTERNAL MEDICINE

## 2022-08-15 PROCEDURE — 93325 DOPPLER ECHO COLOR FLOW MAPG: CPT | Performed by: INTERNAL MEDICINE

## 2022-08-15 PROCEDURE — 93312 ECHO TRANSESOPHAGEAL: CPT

## 2022-08-15 RX ORDER — PROPOFOL 10 MG/ML
VIAL (ML) INTRAVENOUS AS NEEDED
Status: DISCONTINUED | OUTPATIENT
Start: 2022-08-15 | End: 2022-08-15 | Stop reason: SURG

## 2022-08-15 RX ORDER — LABETALOL HYDROCHLORIDE 5 MG/ML
5 INJECTION, SOLUTION INTRAVENOUS
Status: CANCELLED | OUTPATIENT
Start: 2022-08-15

## 2022-08-15 RX ORDER — FLUMAZENIL 0.1 MG/ML
0.2 INJECTION INTRAVENOUS AS NEEDED
Status: CANCELLED | OUTPATIENT
Start: 2022-08-15

## 2022-08-15 RX ORDER — NALOXONE HCL 0.4 MG/ML
0.4 VIAL (ML) INJECTION AS NEEDED
Status: CANCELLED | OUTPATIENT
Start: 2022-08-15

## 2022-08-15 RX ORDER — SODIUM CHLORIDE 0.9 % (FLUSH) 0.9 %
10 SYRINGE (ML) INJECTION AS NEEDED
Status: DISCONTINUED | OUTPATIENT
Start: 2022-08-15 | End: 2022-08-16 | Stop reason: HOSPADM

## 2022-08-15 RX ORDER — LIDOCAINE HYDROCHLORIDE 20 MG/ML
INJECTION, SOLUTION EPIDURAL; INFILTRATION; INTRACAUDAL; PERINEURAL AS NEEDED
Status: DISCONTINUED | OUTPATIENT
Start: 2022-08-15 | End: 2022-08-15 | Stop reason: SURG

## 2022-08-15 RX ORDER — SODIUM CHLORIDE 0.9 % (FLUSH) 0.9 %
10 SYRINGE (ML) INJECTION EVERY 12 HOURS SCHEDULED
Status: DISCONTINUED | OUTPATIENT
Start: 2022-08-15 | End: 2022-08-16 | Stop reason: HOSPADM

## 2022-08-15 RX ORDER — ONDANSETRON 2 MG/ML
4 INJECTION INTRAMUSCULAR; INTRAVENOUS ONCE AS NEEDED
Status: CANCELLED | OUTPATIENT
Start: 2022-08-15

## 2022-08-15 RX ORDER — DROPERIDOL 2.5 MG/ML
0.62 INJECTION, SOLUTION INTRAMUSCULAR; INTRAVENOUS ONCE AS NEEDED
Status: CANCELLED | OUTPATIENT
Start: 2022-08-15

## 2022-08-15 RX ORDER — OXYCODONE AND ACETAMINOPHEN 10; 325 MG/1; MG/1
1 TABLET ORAL ONCE AS NEEDED
Status: CANCELLED | OUTPATIENT
Start: 2022-08-15

## 2022-08-15 RX ORDER — IBUPROFEN 600 MG/1
600 TABLET ORAL ONCE AS NEEDED
Status: CANCELLED | OUTPATIENT
Start: 2022-08-15

## 2022-08-15 RX ORDER — FENTANYL CITRATE 50 UG/ML
25 INJECTION, SOLUTION INTRAMUSCULAR; INTRAVENOUS
Status: CANCELLED | OUTPATIENT
Start: 2022-08-15

## 2022-08-15 RX ORDER — OXYCODONE AND ACETAMINOPHEN 7.5; 325 MG/1; MG/1
2 TABLET ORAL EVERY 4 HOURS PRN
Status: CANCELLED | OUTPATIENT
Start: 2022-08-15 | End: 2022-08-25

## 2022-08-15 RX ADMIN — PROPOFOL 250 MG: 10 INJECTION, EMULSION INTRAVENOUS at 12:07

## 2022-08-15 RX ADMIN — LIDOCAINE HYDROCHLORIDE 100 MG: 20 INJECTION, SOLUTION EPIDURAL; INFILTRATION; INTRACAUDAL; PERINEURAL at 12:07

## 2022-08-15 NOTE — ANESTHESIA POSTPROCEDURE EVALUATION
"Patient: Ivy Ruff    Procedure Summary     Date: 08/15/22 Room / Location: [facility] CATH LAB; [facility] CARDIOLOGY    Anesthesia Start: 1159 Anesthesia Stop: 1228    Procedure: ADULT TRANSESOPHAGEAL ECHO (CORTNEY) W/ CONT IF NECESSARY PER PROTOCOL Diagnosis:       Cerebrovascular accident (CVA) due to embolism of precerebral artery (HCC)      (Cardiac Source of Emboli)    Scheduled Providers: Arthur Clark MD Provider: Taqueria Matos CRNA    Anesthesia Type: MAC ASA Status: 3          Anesthesia Type: MAC    Vitals  Vitals Value Taken Time   /58 08/15/22 1225   Temp     Pulse 42 08/15/22 1227   Resp     SpO2 98 % 08/15/22 1227           Post Anesthesia Care and Evaluation    Patient location during evaluation: PHASE II  Patient participation: complete - patient participated  Level of consciousness: awake and alert  Pain management: adequate    Airway patency: patent  Anesthetic complications: No anesthetic complications    Cardiovascular status: acceptable  Respiratory status: acceptable  Hydration status: acceptable    Comments: Blood pressure 123/74, pulse 52, temperature 97.6 °F (36.4 °C), temperature source Temporal, resp. rate 20, height 167.6 cm (66\"), weight 59.2 kg (130 lb 9.6 oz), SpO2 100 %, not currently breastfeeding.    Pt discharged from PACU based on narinder score >8      "

## 2022-08-15 NOTE — ANESTHESIA PREPROCEDURE EVALUATION
Anesthesia Evaluation     Patient summary reviewed and Nursing notes reviewed   NPO Solid Status: > 8 hours  NPO Liquid Status: > 8 hours           Airway   Mallampati: II  No difficulty expected  Dental    (+) poor dentition        Pulmonary    (+) a smoker Former, COPD, asthma,  Cardiovascular     (+) hypertension, past MI  >12 months, CAD, dysrhythmias Paroxysmal Atrial Fib, hyperlipidemia,     ROS comment: 3/22 echo    · Left ventricular wall thickness is consistent with mild concentric hypertrophy.  · Estimated left ventricular EF = 65% Left ventricular systolic function is normal.  · Left ventricular diastolic function is consistent with (grade II w/high LAP) pseudonormalization.        Neuro/Psych  (+) CVA,    GI/Hepatic/Renal/Endo - negative ROS   (-) liver disease, no renal disease, diabetes, no thyroid disorder    Musculoskeletal (-) negative ROS    Abdominal    Substance History - negative use     OB/GYN negative ob/gyn ROS         Other                      Anesthesia Plan    ASA 3     MAC     intravenous induction     Anesthetic plan, risks, benefits, and alternatives have been provided, discussed and informed consent has been obtained with: patient.        CODE STATUS:

## 2022-08-18 ENCOUNTER — HOSPITAL ENCOUNTER (OUTPATIENT)
Dept: BONE DENSITY | Facility: HOSPITAL | Age: 62
Discharge: HOME | End: 2022-08-18

## 2022-08-18 ENCOUNTER — PREP FOR SURGERY (OUTPATIENT)
Dept: OTHER | Facility: HOSPITAL | Age: 62
End: 2022-08-18

## 2022-08-18 ENCOUNTER — HOSPITAL ENCOUNTER (OUTPATIENT)
Dept: MAMMOGRAPHY | Facility: HOSPITAL | Age: 62
Discharge: HOME | End: 2022-08-18

## 2022-08-18 ENCOUNTER — HOSPITAL ENCOUNTER (OUTPATIENT)
Dept: GENERAL RADIOLOGY | Facility: HOSPITAL | Age: 62
Discharge: HOME | End: 2022-08-18

## 2022-08-18 DIAGNOSIS — Z12.31 ENCOUNTER FOR SCREENING MAMMOGRAM FOR MALIGNANT NEOPLASM OF BREAST: ICD-10-CM

## 2022-08-18 DIAGNOSIS — M79.641 PAIN IN RIGHT HAND: ICD-10-CM

## 2022-08-18 DIAGNOSIS — N95.9 UNSPECIFIED MENOPAUSAL AND PERIMENOPAUSAL DISORDER: ICD-10-CM

## 2022-08-18 PROCEDURE — 77067 SCR MAMMO BI INCL CAD: CPT

## 2022-08-18 PROCEDURE — 77080 DXA BONE DENSITY AXIAL: CPT

## 2022-08-18 PROCEDURE — 77063 BREAST TOMOSYNTHESIS BI: CPT

## 2022-08-18 PROCEDURE — 73130 X-RAY EXAM OF HAND: CPT

## 2022-08-22 ENCOUNTER — TELEPHONE (OUTPATIENT)
Dept: CARDIOLOGY | Facility: CLINIC | Age: 62
End: 2022-08-22

## 2022-08-22 NOTE — TELEPHONE ENCOUNTER
Ms. Ruff is scheduled for a PFO closure on 9/7/22, and she has a contrast allergy.  She prefers the [facility].  Thank you.

## 2022-08-22 NOTE — TELEPHONE ENCOUNTER
Do you want Ms. Ruff to hold her Eliquis prior to her PFO closure?  She has a dye allergy, so I will notify Hayley Blackmon RN.

## 2022-08-23 DIAGNOSIS — Z91.041 CONTRAST MEDIA ALLERGY: Primary | ICD-10-CM

## 2022-08-23 RX ORDER — PREDNISONE 50 MG/1
50 TABLET ORAL TAKE AS DIRECTED
Qty: 3 TABLET | Refills: 0 | Status: CANCELLED | OUTPATIENT
Start: 2022-09-07

## 2022-08-23 RX ORDER — DIPHENHYDRAMINE HCL 25 MG
50 TABLET ORAL TAKE AS DIRECTED
Qty: 2 TABLET | Refills: 0 | Status: CANCELLED | OUTPATIENT
Start: 2022-09-07

## 2022-08-23 NOTE — TELEPHONE ENCOUNTER
Orders pended for Dr. Clark's review/signature.  RN will contact patient for education when orders have been signed.

## 2022-08-29 NOTE — TELEPHONE ENCOUNTER
Per Dr. Clark, he does not use contrast in PFO closure.  No orders needed.  Order encounter marked erroneous.

## 2022-09-07 ENCOUNTER — HOSPITAL ENCOUNTER (OUTPATIENT)
Facility: HOSPITAL | Age: 62
Discharge: HOME | End: 2022-09-08
Attending: INTERNAL MEDICINE | Admitting: INTERNAL MEDICINE

## 2022-09-07 DIAGNOSIS — Q21.12 PFO (PATENT FORAMEN OVALE): ICD-10-CM

## 2022-09-07 LAB
ALBUMIN SERPL-MCNC: 4.6 G/DL (ref 3.5–5.2)
ALBUMIN/GLOB SERPL: 1.6 G/DL
ALP SERPL-CCNC: 106 U/L (ref 39–117)
ALT SERPL W P-5'-P-CCNC: 9 U/L (ref 1–33)
ANION GAP SERPL CALCULATED.3IONS-SCNC: 8 MMOL/L (ref 5–15)
AST SERPL-CCNC: 16 U/L (ref 1–32)
BASOPHILS # BLD AUTO: 0.06 10*3/MM3 (ref 0–0.2)
BASOPHILS NFR BLD AUTO: 0.6 % (ref 0–1.5)
BILIRUB SERPL-MCNC: 0.4 MG/DL (ref 0–1.2)
BUN SERPL-MCNC: 16 MG/DL (ref 8–23)
BUN/CREAT SERPL: 14.2 (ref 7–25)
CALCIUM SPEC-SCNC: 9.1 MG/DL (ref 8.6–10.5)
CHLORIDE SERPL-SCNC: 104 MMOL/L (ref 98–107)
CO2 SERPL-SCNC: 29 MMOL/L (ref 22–29)
CREAT SERPL-MCNC: 1.13 MG/DL (ref 0.57–1)
DEPRECATED RDW RBC AUTO: 47.4 FL (ref 37–54)
EGFRCR SERPLBLD CKD-EPI 2021: 54.8 ML/MIN/1.73
EOSINOPHIL # BLD AUTO: 0.14 10*3/MM3 (ref 0–0.4)
EOSINOPHIL NFR BLD AUTO: 1.5 % (ref 0.3–6.2)
ERYTHROCYTE [DISTWIDTH] IN BLOOD BY AUTOMATED COUNT: 14.1 % (ref 12.3–15.4)
GLOBULIN UR ELPH-MCNC: 2.9 GM/DL
GLUCOSE SERPL-MCNC: 88 MG/DL (ref 65–99)
HCT VFR BLD AUTO: 38 % (ref 34–46.6)
HGB BLD-MCNC: 12.2 G/DL (ref 12–15.9)
IMM GRANULOCYTES # BLD AUTO: 0.02 10*3/MM3 (ref 0–0.05)
IMM GRANULOCYTES NFR BLD AUTO: 0.2 % (ref 0–0.5)
LYMPHOCYTES # BLD AUTO: 2.66 10*3/MM3 (ref 0.7–3.1)
LYMPHOCYTES NFR BLD AUTO: 28.2 % (ref 19.6–45.3)
MCH RBC QN AUTO: 29.4 PG (ref 26.6–33)
MCHC RBC AUTO-ENTMCNC: 32.1 G/DL (ref 31.5–35.7)
MCV RBC AUTO: 91.6 FL (ref 79–97)
MONOCYTES # BLD AUTO: 0.74 10*3/MM3 (ref 0.1–0.9)
MONOCYTES NFR BLD AUTO: 7.8 % (ref 5–12)
NEUTROPHILS NFR BLD AUTO: 5.82 10*3/MM3 (ref 1.7–7)
NEUTROPHILS NFR BLD AUTO: 61.7 % (ref 42.7–76)
NRBC BLD AUTO-RTO: 0 /100 WBC (ref 0–0.2)
PLATELET # BLD AUTO: 361 10*3/MM3 (ref 140–450)
PMV BLD AUTO: 10.2 FL (ref 6–12)
POTASSIUM SERPL-SCNC: 3.7 MMOL/L (ref 3.5–5.2)
PROT SERPL-MCNC: 7.5 G/DL (ref 6–8.5)
RBC # BLD AUTO: 4.15 10*6/MM3 (ref 3.77–5.28)
SODIUM SERPL-SCNC: 141 MMOL/L (ref 136–145)
WBC NRBC COR # BLD: 9.44 10*3/MM3 (ref 3.4–10.8)

## 2022-09-07 PROCEDURE — L1830 KO IMMOB CANVAS LONG PRE OTS: HCPCS | Performed by: INTERNAL MEDICINE

## 2022-09-07 PROCEDURE — 93662 INTRACARDIAC ECG (ICE): CPT | Performed by: INTERNAL MEDICINE

## 2022-09-07 PROCEDURE — C1769 GUIDE WIRE: HCPCS | Performed by: INTERNAL MEDICINE

## 2022-09-07 PROCEDURE — 99152 MOD SED SAME PHYS/QHP 5/>YRS: CPT | Performed by: INTERNAL MEDICINE

## 2022-09-07 PROCEDURE — 25010000002 FENTANYL CITRATE (PF) 50 MCG/ML SOLUTION: Performed by: INTERNAL MEDICINE

## 2022-09-07 PROCEDURE — 25010000002 MIDAZOLAM PER 1 MG: Performed by: INTERNAL MEDICINE

## 2022-09-07 PROCEDURE — 85347 COAGULATION TIME ACTIVATED: CPT

## 2022-09-07 PROCEDURE — 85025 COMPLETE CBC W/AUTO DIFF WBC: CPT | Performed by: INTERNAL MEDICINE

## 2022-09-07 PROCEDURE — C1887 CATHETER, GUIDING: HCPCS | Performed by: INTERNAL MEDICINE

## 2022-09-07 PROCEDURE — S0260 H&P FOR SURGERY: HCPCS | Performed by: INTERNAL MEDICINE

## 2022-09-07 PROCEDURE — 25010000002 HEPARIN (PORCINE) PER 1000 UNITS: Performed by: INTERNAL MEDICINE

## 2022-09-07 PROCEDURE — 80053 COMPREHEN METABOLIC PANEL: CPT | Performed by: INTERNAL MEDICINE

## 2022-09-07 PROCEDURE — C1817 SEPTAL DEFECT IMP SYS: HCPCS | Performed by: INTERNAL MEDICINE

## 2022-09-07 PROCEDURE — C1760 CLOSURE DEV, VASC: HCPCS | Performed by: INTERNAL MEDICINE

## 2022-09-07 PROCEDURE — G0378 HOSPITAL OBSERVATION PER HR: HCPCS

## 2022-09-07 PROCEDURE — C1894 INTRO/SHEATH, NON-LASER: HCPCS | Performed by: INTERNAL MEDICINE

## 2022-09-07 PROCEDURE — 99153 MOD SED SAME PHYS/QHP EA: CPT | Performed by: INTERNAL MEDICINE

## 2022-09-07 PROCEDURE — 93580 TRANSCATH CLOSURE OF ASD: CPT | Performed by: INTERNAL MEDICINE

## 2022-09-07 PROCEDURE — 25010000002 DIPHENHYDRAMINE PER 50 MG: Performed by: INTERNAL MEDICINE

## 2022-09-07 PROCEDURE — C1759 CATH, INTRA ECHOCARDIOGRAPHY: HCPCS | Performed by: INTERNAL MEDICINE

## 2022-09-07 PROCEDURE — 93325 DOPPLER ECHO COLOR FLOW MAPG: CPT | Performed by: INTERNAL MEDICINE

## 2022-09-07 DEVICE — OCCL PFO AMPLATZER CVR 45D 8F 25MM: Type: IMPLANTABLE DEVICE | Site: HEART | Status: FUNCTIONAL

## 2022-09-07 RX ORDER — DOCUSATE SODIUM 100 MG/1
100 CAPSULE, LIQUID FILLED ORAL DAILY
Status: DISCONTINUED | OUTPATIENT
Start: 2022-09-08 | End: 2022-09-08 | Stop reason: HOSPADM

## 2022-09-07 RX ORDER — HEPARIN SODIUM 1000 [USP'U]/ML
INJECTION, SOLUTION INTRAVENOUS; SUBCUTANEOUS AS NEEDED
Status: DISCONTINUED | OUTPATIENT
Start: 2022-09-07 | End: 2022-09-07 | Stop reason: HOSPADM

## 2022-09-07 RX ORDER — LIDOCAINE HYDROCHLORIDE 20 MG/ML
INJECTION, SOLUTION INFILTRATION; PERINEURAL AS NEEDED
Status: DISCONTINUED | OUTPATIENT
Start: 2022-09-07 | End: 2022-09-07 | Stop reason: HOSPADM

## 2022-09-07 RX ORDER — LISINOPRIL 2.5 MG/1
2.5 TABLET ORAL
Status: DISCONTINUED | OUTPATIENT
Start: 2022-09-08 | End: 2022-09-08 | Stop reason: HOSPADM

## 2022-09-07 RX ORDER — SODIUM CHLORIDE 9 MG/ML
50 INJECTION, SOLUTION INTRAVENOUS CONTINUOUS
Status: DISCONTINUED | OUTPATIENT
Start: 2022-09-07 | End: 2022-09-08 | Stop reason: HOSPADM

## 2022-09-07 RX ORDER — MIDAZOLAM HYDROCHLORIDE 1 MG/ML
INJECTION INTRAMUSCULAR; INTRAVENOUS AS NEEDED
Status: DISCONTINUED | OUTPATIENT
Start: 2022-09-07 | End: 2022-09-07 | Stop reason: HOSPADM

## 2022-09-07 RX ORDER — FENTANYL CITRATE 50 UG/ML
INJECTION, SOLUTION INTRAMUSCULAR; INTRAVENOUS AS NEEDED
Status: DISCONTINUED | OUTPATIENT
Start: 2022-09-07 | End: 2022-09-07 | Stop reason: HOSPADM

## 2022-09-07 RX ORDER — ATORVASTATIN CALCIUM 40 MG/1
80 TABLET, FILM COATED ORAL NIGHTLY
Status: DISCONTINUED | OUTPATIENT
Start: 2022-09-07 | End: 2022-09-08 | Stop reason: HOSPADM

## 2022-09-07 RX ORDER — PANTOPRAZOLE SODIUM 40 MG/1
40 TABLET, DELAYED RELEASE ORAL
Status: DISCONTINUED | OUTPATIENT
Start: 2022-09-08 | End: 2022-09-08 | Stop reason: HOSPADM

## 2022-09-07 RX ORDER — ASPIRIN 81 MG/1
81 TABLET, CHEWABLE ORAL DAILY
Status: DISCONTINUED | OUTPATIENT
Start: 2022-09-08 | End: 2022-09-08 | Stop reason: HOSPADM

## 2022-09-07 RX ORDER — ACETAMINOPHEN 325 MG/1
650 TABLET ORAL EVERY 4 HOURS PRN
Status: DISCONTINUED | OUTPATIENT
Start: 2022-09-07 | End: 2022-09-08 | Stop reason: HOSPADM

## 2022-09-07 RX ORDER — DIPHENHYDRAMINE HYDROCHLORIDE 50 MG/ML
INJECTION INTRAMUSCULAR; INTRAVENOUS AS NEEDED
Status: DISCONTINUED | OUTPATIENT
Start: 2022-09-07 | End: 2022-09-07 | Stop reason: HOSPADM

## 2022-09-07 RX ORDER — LIDOCAINE HYDROCHLORIDE 10 MG/ML
0.1 INJECTION, SOLUTION EPIDURAL; INFILTRATION; INTRACAUDAL; PERINEURAL ONCE AS NEEDED
Status: DISCONTINUED | OUTPATIENT
Start: 2022-09-07 | End: 2022-09-07 | Stop reason: HOSPADM

## 2022-09-07 RX ADMIN — APIXABAN 5 MG: 5 TABLET, FILM COATED ORAL at 21:37

## 2022-09-07 RX ADMIN — METOPROLOL TARTRATE 25 MG: 25 TABLET, FILM COATED ORAL at 21:37

## 2022-09-07 RX ADMIN — ATORVASTATIN CALCIUM 80 MG: 40 TABLET, FILM COATED ORAL at 21:37

## 2022-09-07 RX ADMIN — SODIUM CHLORIDE 50 ML/HR: 9 INJECTION, SOLUTION INTRAVENOUS at 22:37

## 2022-09-07 RX ADMIN — SODIUM CHLORIDE 50 ML/HR: 9 INJECTION, SOLUTION INTRAVENOUS at 16:22
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
## 2022-09-07 NOTE — PLAN OF CARE
Goal Outcome Evaluation:  Plan of Care Reviewed With: patient        Progress: no change  Outcome Evaluation: Patient admitted after PFO closure. Very agitated and angry once on the floor. Patient states she did not know she would have to stay overnight or be on bedrest/lay flat in bed. Patient more calm this afternoon. R groin with slight oozing, but soft. VSS. Echo tomorrow. Continue to monitor.

## 2022-09-08 ENCOUNTER — APPOINTMENT (OUTPATIENT)
Dept: CARDIOLOGY | Facility: HOSPITAL | Age: 62
End: 2022-09-08

## 2022-09-08 ENCOUNTER — READMISSION MANAGEMENT (OUTPATIENT)
Dept: CALL CENTER | Facility: HOSPITAL | Age: 62
End: 2022-09-08

## 2022-09-08 VITALS
TEMPERATURE: 98.5 F | RESPIRATION RATE: 16 BRPM | SYSTOLIC BLOOD PRESSURE: 154 MMHG | HEIGHT: 66 IN | OXYGEN SATURATION: 96 % | DIASTOLIC BLOOD PRESSURE: 81 MMHG | WEIGHT: 131.6 LBS | HEART RATE: 70 BPM | BODY MASS INDEX: 21.15 KG/M2

## 2022-09-08 LAB
ACT BLD: 196 SECONDS (ref 82–152)
ANION GAP SERPL CALCULATED.3IONS-SCNC: 7 MMOL/L (ref 5–15)
BH CV ECHO MEAS - EDV(MOD-SP4): 61 ML
BH CV ECHO MEAS - EF(MOD-SP4): 57.4 %
BH CV ECHO MEAS - ESV(MOD-SP4): 26 ML
BH CV ECHO MEAS - LV DIASTOLIC VOL/BSA (35-75): 36.5 CM2
BH CV ECHO MEAS - LV SYSTOLIC VOL/BSA (12-30): 15.6 CM2
BH CV ECHO MEAS - SI(MOD-SP4): 20.9 ML/M2
BH CV ECHO MEAS - SV(MOD-SP4): 35 ML
BUN SERPL-MCNC: 20 MG/DL (ref 8–23)
BUN/CREAT SERPL: 18.5 (ref 7–25)
CALCIUM SPEC-SCNC: 8.8 MG/DL (ref 8.6–10.5)
CHLORIDE SERPL-SCNC: 105 MMOL/L (ref 98–107)
CO2 SERPL-SCNC: 27 MMOL/L (ref 22–29)
CREAT SERPL-MCNC: 1.08 MG/DL (ref 0.57–1)
DEPRECATED RDW RBC AUTO: 47.5 FL (ref 37–54)
EGFRCR SERPLBLD CKD-EPI 2021: 57.8 ML/MIN/1.73
ERYTHROCYTE [DISTWIDTH] IN BLOOD BY AUTOMATED COUNT: 14.2 % (ref 12.3–15.4)
GLUCOSE SERPL-MCNC: 97 MG/DL (ref 65–99)
HCT VFR BLD AUTO: 35.6 % (ref 34–46.6)
HGB BLD-MCNC: 11.4 G/DL (ref 12–15.9)
MAXIMAL PREDICTED HEART RATE: 157 BPM
MCH RBC QN AUTO: 29.1 PG (ref 26.6–33)
MCHC RBC AUTO-ENTMCNC: 32 G/DL (ref 31.5–35.7)
MCV RBC AUTO: 90.8 FL (ref 79–97)
PLATELET # BLD AUTO: 312 10*3/MM3 (ref 140–450)
PMV BLD AUTO: 10.4 FL (ref 6–12)
POTASSIUM SERPL-SCNC: 4.3 MMOL/L (ref 3.5–5.2)
RBC # BLD AUTO: 3.92 10*6/MM3 (ref 3.77–5.28)
SODIUM SERPL-SCNC: 139 MMOL/L (ref 136–145)
STRESS TARGET HR: 133 BPM
WBC NRBC COR # BLD: 9.6 10*3/MM3 (ref 3.4–10.8)

## 2022-09-08 PROCEDURE — 85027 COMPLETE CBC AUTOMATED: CPT | Performed by: INTERNAL MEDICINE

## 2022-09-08 PROCEDURE — 93308 TTE F-UP OR LMTD: CPT | Performed by: HOSPITALIST

## 2022-09-08 PROCEDURE — 93308 TTE F-UP OR LMTD: CPT

## 2022-09-08 PROCEDURE — 93325 DOPPLER ECHO COLOR FLOW MAPG: CPT

## 2022-09-08 PROCEDURE — 99217 PR OBSERVATION CARE DISCHARGE MANAGEMENT: CPT | Performed by: NURSE PRACTITIONER

## 2022-09-08 PROCEDURE — 94799 UNLISTED PULMONARY SVC/PX: CPT

## 2022-09-08 PROCEDURE — 93325 DOPPLER ECHO COLOR FLOW MAPG: CPT | Performed by: HOSPITALIST

## 2022-09-08 PROCEDURE — 80048 BASIC METABOLIC PNL TOTAL CA: CPT | Performed by: INTERNAL MEDICINE

## 2022-09-08 PROCEDURE — G0378 HOSPITAL OBSERVATION PER HR: HCPCS

## 2022-09-08 RX ADMIN — APIXABAN 5 MG: 5 TABLET, FILM COATED ORAL at 09:28

## 2022-09-08 RX ADMIN — ASPIRIN 81 MG: 81 TABLET, CHEWABLE ORAL at 09:28

## 2022-09-08 RX ADMIN — METOPROLOL TARTRATE 25 MG: 25 TABLET, FILM COATED ORAL at 09:28

## 2022-09-08 RX ADMIN — DOCUSATE SODIUM 100 MG: 100 CAPSULE, LIQUID FILLED ORAL at 09:28

## 2022-09-08 RX ADMIN — LISINOPRIL 2.5 MG: 2.5 TABLET ORAL at 09:28

## 2022-09-08 NOTE — PLAN OF CARE
Goal Outcome Evaluation:  Plan of Care Reviewed With: patient        Progress: improving  Outcome Evaluation: Pt is A/O, on RA, up ab bao. Pt has slept most of this shift. Normal saline running at 50 mL/hr continuous. Pt to have an Echo this AM. Tele shows SB/SR, HR 59-76. Safety maintained.
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
## 2022-09-09 ENCOUNTER — TELEPHONE (OUTPATIENT)
Dept: CARDIOLOGY | Facility: CLINIC | Age: 62
End: 2022-09-09

## 2022-09-09 NOTE — OUTREACH NOTE
Prep Survey    Flowsheet Row Responses   [facility] facility patient discharged from? [place]   Is LACE score < 7 ? Yes   Emergency Room discharge w/ pulse ox? No   Eligibility Readm Mgmt   Discharge diagnosis PFO (patent foramen ovale   Does the patient have one of the following disease processes/diagnoses(primary or secondary)? Stroke   Does the patient have Home health ordered? No   Is there a DME ordered? No   Prep survey completed? Yes          MIGUEL ANGEL WOODRUFF - Registered Nurse

## 2022-09-14 ENCOUNTER — READMISSION MANAGEMENT (OUTPATIENT)
Dept: CALL CENTER | Facility: HOSPITAL | Age: 62
End: 2022-09-14

## 2022-09-14 NOTE — OUTREACH NOTE
Stroke Week 1 Survey    Flowsheet Row Responses   [facility] facility patient discharged from? [place]   Does the patient have one of the following disease processes/diagnoses(primary or secondary)? Stroke   Week 1 attempt successful? No   Unsuccessful attempts Attempt 1          AMRIK WOODRUFF - Registered Nurse

## 2022-09-23 ENCOUNTER — READMISSION MANAGEMENT (OUTPATIENT)
Dept: CALL CENTER | Facility: HOSPITAL | Age: 62
End: 2022-09-23

## 2022-09-23 NOTE — OUTREACH NOTE
Stroke Week 3 Survey    Flowsheet Row Responses   [facility] facility patient discharged from? [place]   Does the patient have one of the following disease processes/diagnoses(primary or secondary)? Stroke   Week 3 attempt successful? No   Unsuccessful attempts Attempt 1          YOLANDA BOONE - Registered Nurse

## 2022-09-29 DIAGNOSIS — E78.2 MIXED HYPERLIPIDEMIA: Primary | ICD-10-CM

## 2022-09-30 ENCOUNTER — READMISSION MANAGEMENT (OUTPATIENT)
Dept: CALL CENTER | Facility: HOSPITAL | Age: 62
End: 2022-09-30

## 2022-09-30 RX ORDER — ATORVASTATIN CALCIUM 80 MG/1
80 TABLET, FILM COATED ORAL DAILY
Qty: 30 TABLET | Refills: 0 | Status: SHIPPED | OUTPATIENT
Start: 2022-09-30 | End: 2022-12-02 | Stop reason: SDUPTHER

## 2022-09-30 NOTE — OUTREACH NOTE
Stroke Week 4 Survey    Flowsheet Row Responses   [facility] facility patient discharged from? [place]   Does the patient have one of the following disease processes/diagnoses(primary or secondary)? Stroke   Week 4 attempt successful? No  [attempted home and cell ]          RODRICK MOROCHO - Registered Nurse

## 2022-10-12 ENCOUNTER — OFFICE VISIT (OUTPATIENT)
Dept: NEUROLOGY | Facility: CLINIC | Age: 62
End: 2022-10-12

## 2022-10-12 VITALS
BODY MASS INDEX: 22.02 KG/M2 | HEART RATE: 62 BPM | OXYGEN SATURATION: 99 % | WEIGHT: 137 LBS | DIASTOLIC BLOOD PRESSURE: 80 MMHG | HEIGHT: 66 IN | SYSTOLIC BLOOD PRESSURE: 132 MMHG

## 2022-10-12 DIAGNOSIS — I69.30 CHRONIC ARTERIAL ISCHEMIC STROKE: Primary | ICD-10-CM

## 2022-10-12 DIAGNOSIS — E78.2 MIXED HYPERLIPIDEMIA: ICD-10-CM

## 2022-10-12 PROCEDURE — 99214 OFFICE O/P EST MOD 30 MIN: CPT | Performed by: NURSE PRACTITIONER
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
## 2022-12-02 ENCOUNTER — OFFICE VISIT (OUTPATIENT)
Dept: CARDIOLOGY | Facility: CLINIC | Age: 62
End: 2022-12-02

## 2022-12-02 VITALS
DIASTOLIC BLOOD PRESSURE: 72 MMHG | WEIGHT: 140 LBS | SYSTOLIC BLOOD PRESSURE: 120 MMHG | BODY MASS INDEX: 22.5 KG/M2 | HEART RATE: 63 BPM | HEIGHT: 66 IN | OXYGEN SATURATION: 99 %

## 2022-12-02 DIAGNOSIS — E78.2 MIXED HYPERLIPIDEMIA: ICD-10-CM

## 2022-12-02 DIAGNOSIS — I25.10 CORONARY ARTERY DISEASE INVOLVING NATIVE CORONARY ARTERY OF NATIVE HEART WITHOUT ANGINA PECTORIS: Primary | ICD-10-CM

## 2022-12-02 DIAGNOSIS — Q21.12 PFO (PATENT FORAMEN OVALE): ICD-10-CM

## 2022-12-02 DIAGNOSIS — I48.0 PAROXYSMAL ATRIAL FIBRILLATION: ICD-10-CM

## 2022-12-02 DIAGNOSIS — I10 PRIMARY HYPERTENSION: ICD-10-CM

## 2022-12-02 PROCEDURE — 93000 ELECTROCARDIOGRAM COMPLETE: CPT | Performed by: INTERNAL MEDICINE

## 2022-12-02 PROCEDURE — 99214 OFFICE O/P EST MOD 30 MIN: CPT | Performed by: INTERNAL MEDICINE

## 2022-12-02 RX ORDER — ATORVASTATIN CALCIUM 80 MG/1
80 TABLET, FILM COATED ORAL DAILY
Qty: 30 TABLET | Refills: 5 | Status: SHIPPED | OUTPATIENT
Start: 2022-12-02
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
## 2022-12-28 ENCOUNTER — HOSPITAL ENCOUNTER (OUTPATIENT)
Dept: CARDIOLOGY | Facility: HOSPITAL | Age: 62
Discharge: HOME | End: 2022-12-28
Admitting: INTERNAL MEDICINE

## 2022-12-28 VITALS
DIASTOLIC BLOOD PRESSURE: 72 MMHG | SYSTOLIC BLOOD PRESSURE: 120 MMHG | WEIGHT: 140 LBS | BODY MASS INDEX: 22.5 KG/M2 | HEIGHT: 66 IN

## 2022-12-28 DIAGNOSIS — Q21.12 PFO (PATENT FORAMEN OVALE): ICD-10-CM

## 2022-12-28 PROCEDURE — 93306 TTE W/DOPPLER COMPLETE: CPT

## 2022-12-28 PROCEDURE — 93306 TTE W/DOPPLER COMPLETE: CPT | Performed by: INTERNAL MEDICINE

## 2022-12-29 LAB
BH CV ECHO MEAS - AO MAX PG: 16 MMHG
BH CV ECHO MEAS - AO MEAN PG: 8 MMHG
BH CV ECHO MEAS - AO ROOT DIAM: 2.7 CM
BH CV ECHO MEAS - AO V2 MAX: 200 CM/SEC
BH CV ECHO MEAS - AO V2 VTI: 36.7 CM
BH CV ECHO MEAS - AVA(I,D): 1.79 CM2
BH CV ECHO MEAS - EDV(CUBED): 91.1 ML
BH CV ECHO MEAS - EDV(MOD-SP4): 52.3 ML
BH CV ECHO MEAS - EF(MOD-SP4): 56 %
BH CV ECHO MEAS - ESV(CUBED): 12.3 ML
BH CV ECHO MEAS - ESV(MOD-SP4): 23 ML
BH CV ECHO MEAS - FS: 48.7 %
BH CV ECHO MEAS - IVS/LVPW: 0.82 CM
BH CV ECHO MEAS - IVSD: 0.77 CM
BH CV ECHO MEAS - LA DIMENSION: 3.5 CM
BH CV ECHO MEAS - LAT PEAK E' VEL: 10.9 CM/SEC
BH CV ECHO MEAS - LV DIASTOLIC VOL/BSA (35-75): 30.4 CM2
BH CV ECHO MEAS - LV MASS(C)D: 123.8 GRAMS
BH CV ECHO MEAS - LV MAX PG: 6.4 MMHG
BH CV ECHO MEAS - LV MEAN PG: 3 MMHG
BH CV ECHO MEAS - LV SYSTOLIC VOL/BSA (12-30): 13.4 CM2
BH CV ECHO MEAS - LV V1 MAX: 126 CM/SEC
BH CV ECHO MEAS - LV V1 VTI: 23.2 CM
BH CV ECHO MEAS - LVIDD: 4.5 CM
BH CV ECHO MEAS - LVIDS: 2.31 CM
BH CV ECHO MEAS - LVOT AREA: 2.8 CM2
BH CV ECHO MEAS - LVOT DIAM: 1.9 CM
BH CV ECHO MEAS - LVPWD: 0.94 CM
BH CV ECHO MEAS - MED PEAK E' VEL: 6.3 CM/SEC
BH CV ECHO MEAS - MV A MAX VEL: 83.2 CM/SEC
BH CV ECHO MEAS - MV DEC TIME: 0.32 MSEC
BH CV ECHO MEAS - MV E MAX VEL: 65.5 CM/SEC
BH CV ECHO MEAS - MV E/A: 0.79
BH CV ECHO MEAS - RAP SYSTOLE: 5 MMHG
BH CV ECHO MEAS - RVSP: 31.4 MMHG
BH CV ECHO MEAS - SI(MOD-SP4): 17 ML/M2
BH CV ECHO MEAS - SV(LVOT): 65.8 ML
BH CV ECHO MEAS - SV(MOD-SP4): 29.3 ML
BH CV ECHO MEAS - TR MAX PG: 26.4 MMHG
BH CV ECHO MEAS - TR MAX VEL: 257 CM/SEC
BH CV ECHO MEASUREMENTS AVERAGE E/E' RATIO: 7.62
LEFT ATRIUM VOLUME INDEX: 23.8 ML/M2
LEFT ATRIUM VOLUME: 41 ML
LV EF 2D ECHO EST: 60 %
MAXIMAL PREDICTED HEART RATE: 156 BPM
STRESS TARGET HR: 133 BPM

## 2023-04-12 ENCOUNTER — OFFICE VISIT (OUTPATIENT)
Dept: NEUROLOGY | Facility: CLINIC | Age: 63
End: 2023-04-12
Payer: COMMERCIAL

## 2023-04-12 VITALS
DIASTOLIC BLOOD PRESSURE: 70 MMHG | HEART RATE: 74 BPM | OXYGEN SATURATION: 97 % | HEIGHT: 66 IN | BODY MASS INDEX: 23.05 KG/M2 | WEIGHT: 143.4 LBS | SYSTOLIC BLOOD PRESSURE: 118 MMHG

## 2023-04-12 DIAGNOSIS — I48.0 PAROXYSMAL ATRIAL FIBRILLATION: ICD-10-CM

## 2023-04-12 DIAGNOSIS — E78.2 MIXED HYPERLIPIDEMIA: ICD-10-CM

## 2023-04-12 DIAGNOSIS — I69.30 CHRONIC ARTERIAL ISCHEMIC STROKE: Primary | ICD-10-CM

## 2023-04-12 PROCEDURE — 1160F RVW MEDS BY RX/DR IN RCRD: CPT | Performed by: NURSE PRACTITIONER

## 2023-04-12 PROCEDURE — 1159F MED LIST DOCD IN RCRD: CPT | Performed by: NURSE PRACTITIONER

## 2023-04-12 PROCEDURE — 3078F DIAST BP <80 MM HG: CPT | Performed by: NURSE PRACTITIONER

## 2023-04-12 PROCEDURE — 3074F SYST BP LT 130 MM HG: CPT | Performed by: NURSE PRACTITIONER

## 2023-04-12 PROCEDURE — 99213 OFFICE O/P EST LOW 20 MIN: CPT | Performed by: NURSE PRACTITIONER

## 2023-04-12 RX ORDER — CHOLECALCIFEROL (VITAMIN D3) 1250 MCG
CAPSULE ORAL
COMMUNITY
Start: 2023-03-20
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
## 2023-10-10 ENCOUNTER — OFFICE VISIT (OUTPATIENT)
Dept: NEUROLOGY | Facility: CLINIC | Age: 63
End: 2023-10-10
Payer: COMMERCIAL

## 2023-10-10 VITALS
WEIGHT: 140 LBS | DIASTOLIC BLOOD PRESSURE: 58 MMHG | HEIGHT: 66 IN | HEART RATE: 47 BPM | SYSTOLIC BLOOD PRESSURE: 116 MMHG | OXYGEN SATURATION: 99 % | BODY MASS INDEX: 22.5 KG/M2

## 2023-10-10 DIAGNOSIS — Z86.73 CHRONIC ARTERIAL ISCHEMIC STROKE: Primary | ICD-10-CM

## 2023-10-10 DIAGNOSIS — I48.0 PAROXYSMAL ATRIAL FIBRILLATION: ICD-10-CM

## 2023-10-10 DIAGNOSIS — E78.2 MIXED HYPERLIPIDEMIA: ICD-10-CM
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
## 2023-12-11 ENCOUNTER — OFFICE VISIT (OUTPATIENT)
Dept: CARDIOLOGY | Facility: CLINIC | Age: 63
End: 2023-12-11
Payer: COMMERCIAL

## 2023-12-11 VITALS
WEIGHT: 146 LBS | DIASTOLIC BLOOD PRESSURE: 60 MMHG | HEIGHT: 66 IN | BODY MASS INDEX: 23.46 KG/M2 | HEART RATE: 58 BPM | SYSTOLIC BLOOD PRESSURE: 110 MMHG | OXYGEN SATURATION: 98 %

## 2023-12-11 DIAGNOSIS — I48.0 PAROXYSMAL ATRIAL FIBRILLATION: ICD-10-CM

## 2023-12-11 DIAGNOSIS — E78.2 MIXED HYPERLIPIDEMIA: ICD-10-CM

## 2023-12-11 DIAGNOSIS — I10 PRIMARY HYPERTENSION: ICD-10-CM

## 2023-12-11 DIAGNOSIS — I63.10 CEREBROVASCULAR ACCIDENT (CVA) DUE TO EMBOLISM OF PRECEREBRAL ARTERY: ICD-10-CM

## 2023-12-11 DIAGNOSIS — I25.10 CORONARY ARTERY DISEASE INVOLVING NATIVE CORONARY ARTERY OF NATIVE HEART WITHOUT ANGINA PECTORIS: Primary | ICD-10-CM

## 2023-12-11 DIAGNOSIS — Q21.12 PFO (PATENT FORAMEN OVALE): ICD-10-CM

## 2023-12-11 PROCEDURE — 1160F RVW MEDS BY RX/DR IN RCRD: CPT | Performed by: INTERNAL MEDICINE

## 2023-12-11 PROCEDURE — 3078F DIAST BP <80 MM HG: CPT | Performed by: INTERNAL MEDICINE

## 2023-12-11 PROCEDURE — 1159F MED LIST DOCD IN RCRD: CPT | Performed by: INTERNAL MEDICINE

## 2023-12-11 PROCEDURE — 99214 OFFICE O/P EST MOD 30 MIN: CPT | Performed by: INTERNAL MEDICINE

## 2023-12-11 PROCEDURE — 3074F SYST BP LT 130 MM HG: CPT | Performed by: INTERNAL MEDICINE

## 2023-12-11 PROCEDURE — 93000 ELECTROCARDIOGRAM COMPLETE: CPT | Performed by: INTERNAL MEDICINE

## 2023-12-11 RX ORDER — ALBUTEROL SULFATE 90 UG/1
1 AEROSOL, METERED RESPIRATORY (INHALATION) EVERY 4 HOURS PRN
COMMUNITY

## 2023-12-28 ENCOUNTER — TRANSCRIBE ORDERS (OUTPATIENT)
Dept: ADMINISTRATIVE | Facility: HOSPITAL | Age: 63
End: 2023-12-28
Payer: COMMERCIAL

## 2023-12-28 DIAGNOSIS — Z12.31 SCREENING MAMMOGRAM FOR BREAST CANCER: Primary | ICD-10-CM

## 2024-01-09 ENCOUNTER — HOSPITAL ENCOUNTER (OUTPATIENT)
Dept: MAMMOGRAPHY | Facility: HOSPITAL | Age: 64
Discharge: HOME | End: 2024-01-09
Admitting: NURSE PRACTITIONER
Payer: COMMERCIAL

## 2024-01-09 PROCEDURE — 77067 SCR MAMMO BI INCL CAD: CPT

## 2024-01-09 PROCEDURE — 77063 BREAST TOMOSYNTHESIS BI: CPT

## 2024-03-12 NOTE — TELEPHONE ENCOUNTER
Pt called left a  msg asking if she can travel on an airplane.  She is recently s/p PFO closure.  Sidney James, CMA   
Pt informed.  Left a  msg.  Sidney James, CMA   
She can travel on a commercial plane but not on a private plane that is not pressurized  
Patient/Caregiver provided printed discharge information.

## 2024-08-29 ENCOUNTER — TELEPHONE (OUTPATIENT)
Dept: NEUROLOGY | Facility: CLINIC | Age: 64
End: 2024-08-29
Payer: COMMERCIAL

## 2024-08-29 NOTE — TELEPHONE ENCOUNTER
HUB TO RELAY         APPOINTMENT WITH [facility] HAS TO BE CANCELLED DUE TO [facility] NO LONGER BEING IN THE OFFICE.      PATIENT CAN FOLLOW UP WITH THEIR PCP .      IF PATIENT HAS STROKE LIKE SYMPTOMS PLEASE CALL 911 OR GO TO THE NEAREST EMERGENCY ROOM.

## 2024-09-16 ENCOUNTER — TRANSCRIBE ORDERS (OUTPATIENT)
Dept: ADMINISTRATIVE | Facility: HOSPITAL | Age: 64
End: 2024-09-16
Payer: MEDICARE

## 2024-09-16 DIAGNOSIS — N95.8 OTHER SPECIFIED MENOPAUSAL AND PERIMENOPAUSAL DISORDERS: Primary | ICD-10-CM

## 2024-09-24 ENCOUNTER — HOSPITAL ENCOUNTER (OUTPATIENT)
Dept: BONE DENSITY | Facility: HOSPITAL | Age: 64
Discharge: HOME | End: 2024-09-24
Admitting: NURSE PRACTITIONER
Payer: MEDICARE

## 2024-09-24 DIAGNOSIS — N95.8 OTHER SPECIFIED MENOPAUSAL AND PERIMENOPAUSAL DISORDERS: ICD-10-CM

## 2024-09-24 PROCEDURE — 77080 DXA BONE DENSITY AXIAL: CPT
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
## 2024-10-31 ENCOUNTER — OFFICE VISIT (OUTPATIENT)
Dept: GASTROENTEROLOGY | Facility: CLINIC | Age: 64
End: 2024-10-31
Payer: MEDICARE

## 2024-10-31 VITALS
WEIGHT: 146 LBS | SYSTOLIC BLOOD PRESSURE: 120 MMHG | DIASTOLIC BLOOD PRESSURE: 64 MMHG | HEIGHT: 66 IN | TEMPERATURE: 98 F | BODY MASS INDEX: 23.46 KG/M2 | HEART RATE: 65 BPM | OXYGEN SATURATION: 97 %

## 2024-10-31 DIAGNOSIS — Z79.01 ANTICOAGULATED: ICD-10-CM

## 2024-10-31 DIAGNOSIS — Z80.0 FAMILY HISTORY OF COLON CANCER: Primary | ICD-10-CM

## 2024-10-31 DIAGNOSIS — K21.9 GASTROESOPHAGEAL REFLUX DISEASE, UNSPECIFIED WHETHER ESOPHAGITIS PRESENT: ICD-10-CM

## 2024-11-15 ENCOUNTER — TELEPHONE (OUTPATIENT)
Dept: GASTROENTEROLOGY | Facility: CLINIC | Age: 64
End: 2024-11-15
Payer: MEDICARE

## 2024-11-19 ENCOUNTER — TELEPHONE (OUTPATIENT)
Dept: GASTROENTEROLOGY | Facility: CLINIC | Age: 64
End: 2024-11-19
Payer: MEDICARE

## 2024-11-19 ENCOUNTER — ANESTHESIA EVENT (OUTPATIENT)
Dept: GASTROENTEROLOGY | Facility: HOSPITAL | Age: 64
End: 2024-11-19
Payer: MEDICARE

## 2024-11-19 ENCOUNTER — HOSPITAL ENCOUNTER (OUTPATIENT)
Facility: HOSPITAL | Age: 64
Setting detail: HOSPITAL OUTPATIENT SURGERY
Discharge: HOME | End: 2024-11-19
Attending: INTERNAL MEDICINE | Admitting: INTERNAL MEDICINE
Payer: MEDICARE

## 2024-11-19 ENCOUNTER — ANESTHESIA (OUTPATIENT)
Dept: GASTROENTEROLOGY | Facility: HOSPITAL | Age: 64
End: 2024-11-19
Payer: MEDICARE

## 2024-11-19 VITALS
DIASTOLIC BLOOD PRESSURE: 72 MMHG | TEMPERATURE: 96.5 F | SYSTOLIC BLOOD PRESSURE: 114 MMHG | OXYGEN SATURATION: 96 % | HEIGHT: 65 IN | WEIGHT: 142 LBS | BODY MASS INDEX: 23.66 KG/M2 | RESPIRATION RATE: 20 BRPM | HEART RATE: 60 BPM

## 2024-11-19 DIAGNOSIS — Z80.0 FAMILY HISTORY OF COLON CANCER: ICD-10-CM

## 2024-11-19 PROCEDURE — 25010000002 LIDOCAINE PF 2% 2 % SOLUTION: Performed by: NURSE ANESTHETIST, CERTIFIED REGISTERED

## 2024-11-19 PROCEDURE — G0105 COLORECTAL SCRN; HI RISK IND: HCPCS | Performed by: INTERNAL MEDICINE

## 2024-11-19 PROCEDURE — 25010000002 PROPOFOL 10 MG/ML EMULSION: Performed by: NURSE ANESTHETIST, CERTIFIED REGISTERED

## 2024-11-19 RX ORDER — SODIUM CHLORIDE 9 MG/ML
500 INJECTION, SOLUTION INTRAVENOUS CONTINUOUS PRN
Status: DISCONTINUED | OUTPATIENT
Start: 2024-11-19 | End: 2024-11-19 | Stop reason: HOSPADM

## 2024-11-19 RX ORDER — SODIUM CHLORIDE 0.9 % (FLUSH) 0.9 %
10 SYRINGE (ML) INJECTION AS NEEDED
Status: DISCONTINUED | OUTPATIENT
Start: 2024-11-19 | End: 2024-11-19 | Stop reason: HOSPADM

## 2024-11-19 RX ORDER — LIDOCAINE HYDROCHLORIDE 20 MG/ML
INJECTION, SOLUTION EPIDURAL; INFILTRATION; INTRACAUDAL; PERINEURAL AS NEEDED
Status: DISCONTINUED | OUTPATIENT
Start: 2024-11-19 | End: 2024-11-19 | Stop reason: SURG

## 2024-11-19 RX ORDER — PROPOFOL 10 MG/ML
VIAL (ML) INTRAVENOUS AS NEEDED
Status: DISCONTINUED | OUTPATIENT
Start: 2024-11-19 | End: 2024-11-19 | Stop reason: SURG

## 2024-11-19 RX ORDER — LIDOCAINE HYDROCHLORIDE 10 MG/ML
0.5 INJECTION, SOLUTION EPIDURAL; INFILTRATION; INTRACAUDAL; PERINEURAL ONCE AS NEEDED
Status: CANCELLED | OUTPATIENT
Start: 2024-11-19

## 2024-11-19 RX ADMIN — Medication 10 ML: at 08:02

## 2024-11-19 RX ADMIN — PROPOFOL 200 MG: 10 INJECTION, EMULSION INTRAVENOUS at 09:35

## 2024-11-19 RX ADMIN — LIDOCAINE HYDROCHLORIDE 100 MG: 20 INJECTION, SOLUTION EPIDURAL; INFILTRATION; INTRACAUDAL; PERINEURAL at 09:35
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
## 2024-11-19 NOTE — ANESTHESIA POSTPROCEDURE EVALUATION
Patient: Ivy Ruff    Procedure Summary       Date: 11/19/24 Room / Location: [facility] ENDOSCOPY 4 / [facility] ENDOSCOPY    Anesthesia Start: 0931 Anesthesia Stop: 0948    Procedure: COLONOSCOPY WITH ANESTHESIA Diagnosis:       Family history of colon cancer      (Family history of colon cancer [Z80.0])    Surgeons: Jamey Magdaleno DO Provider: Joseph Tracy CRNA    Anesthesia Type: MAC ASA Status: 3            Anesthesia Type: MAC    Vitals  Vitals Value Taken Time   BP     Temp     Pulse 56 11/19/24 0948   Resp     SpO2 96 % 11/19/24 0948   Vitals shown include unfiled device data.        Post Anesthesia Care and Evaluation    Patient location during evaluation: PHASE II  Patient participation: complete - patient participated  Level of consciousness: awake and alert  Pain score: 0    Airway patency: patent  Anesthetic complications: No anesthetic complications  PONV Status: none  Cardiovascular status: acceptable  Respiratory status: acceptable  Hydration status: acceptable

## 2024-11-19 NOTE — ANESTHESIA PREPROCEDURE EVALUATION
Anesthesia Evaluation     no history of anesthetic complications:   NPO Solid Status: > 8 hours  NPO Liquid Status: > 2 hours           Airway   Mallampati: I  No difficulty expected  Dental          Pulmonary    (+) a smoker (quit 2022) Former, COPD, asthma,  Cardiovascular   Exercise tolerance: good (4-7 METS)    (+) hypertension, valvular problems/murmurs (PFO s/p closure), past MI  >12 months, CAD, cardiac stents (1 stent placed 2008) more than 12 months ago , hyperlipidemia      Neuro/Psych  (+) CVA  GI/Hepatic/Renal/Endo    (-) liver disease, no renal disease, diabetes    Musculoskeletal     Abdominal    Substance History      OB/GYN          Other                    Anesthesia Plan    ASA 3     MAC     intravenous induction     Anesthetic plan, risks, benefits, and alternatives have been provided, discussed and informed consent has been obtained with: patient.    CODE STATUS:

## 2024-12-03 ENCOUNTER — OFFICE VISIT (OUTPATIENT)
Age: 64
End: 2024-12-03
Payer: MEDICARE

## 2024-12-03 VITALS — BODY MASS INDEX: 23.32 KG/M2 | WEIGHT: 140 LBS | HEIGHT: 65 IN

## 2024-12-03 DIAGNOSIS — M25.531 WRIST PAIN, RIGHT: Primary | ICD-10-CM

## 2024-12-03 DIAGNOSIS — M25.512 ACUTE PAIN OF LEFT SHOULDER: ICD-10-CM

## 2024-12-03 DIAGNOSIS — S52.571D OTHER CLOSED INTRA-ARTICULAR FRACTURE OF DISTAL END OF RIGHT RADIUS WITH ROUTINE HEALING, SUBSEQUENT ENCOUNTER: ICD-10-CM

## 2024-12-03 RX ORDER — BUPIVACAINE HYDROCHLORIDE 5 MG/ML
3 INJECTION, SOLUTION EPIDURAL; INTRACAUDAL ONCE
Status: COMPLETED | OUTPATIENT
Start: 2024-12-03 | End: 2024-12-03

## 2024-12-03 RX ORDER — BETAMETHASONE SODIUM PHOSPHATE AND BETAMETHASONE ACETATE 3; 3 MG/ML; MG/ML
6 INJECTION, SUSPENSION INTRA-ARTICULAR; INTRALESIONAL; INTRAMUSCULAR; SOFT TISSUE ONCE
Status: COMPLETED | OUTPATIENT
Start: 2024-12-03 | End: 2024-12-03

## 2024-12-03 RX ORDER — LIDOCAINE HYDROCHLORIDE 10 MG/ML
1 INJECTION, SOLUTION INFILTRATION; PERINEURAL ONCE
Status: COMPLETED | OUTPATIENT
Start: 2024-12-03 | End: 2024-12-03

## 2024-12-03 RX ADMIN — LIDOCAINE HYDROCHLORIDE 1 ML: 10 INJECTION, SOLUTION INFILTRATION; PERINEURAL at 10:46

## 2024-12-03 RX ADMIN — BUPIVACAINE HYDROCHLORIDE 15 MG: 5 INJECTION, SOLUTION EPIDURAL; INTRACAUDAL at 10:45

## 2024-12-03 RX ADMIN — BETAMETHASONE SODIUM PHOSPHATE AND BETAMETHASONE ACETATE 6 MG: 3; 3 INJECTION, SUSPENSION INTRA-ARTICULAR; INTRALESIONAL; INTRAMUSCULAR; SOFT TISSUE at 10:44

## 2024-12-03 NOTE — PROGRESS NOTES
Orthopaedic Clinic Note - Established Patient    NAME:  Hui Santana   : 1960  MRN: [id number]      12/3/2024      CHIEF COMPLAINT:  Right wrist pain status post ORIF right wrist radius fracture   Left shoulder pain      HISTORY OF PRESENT ILLNESS:   Is a pleasant 64-year-old female comes in mainly for right wrist pain status post ORIF right distal radius fracture .  Patient lives alone.  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  Lifting heavy things.  She denies any Injury or trauma but is having increased plaints in her right wrist and left shoulder.  Pains are moderate, constant, achy throbbing piercing.  She is here to discuss further treatment options.    Past Medical History:    No past medical history on file.    Past Surgical History:        Procedure Laterality Date    FEMUR FRACTURE SURGERY  10/23/2021    FEMUR OPEN REDUCTION INTERNAL FIXATION BONE GRAFT performed by Cecilio Sears MD at [facility] OR    WRIST FRACTURE SURGERY Right 10/23/2021    WRIST OPEN REDUCTION INTERNAL FIXATION performed by Cecilio Sears MD at [facility] OR       Current Medications:   Prior to Admission medications    Medication Sig Start Date End Date Taking? Authorizing Provider   metoprolol tartrate (LOPRESSOR) 25 MG tablet Take 1 tablet by mouth 2 times daily 21   Arnie Dale MD   ferrous sulfate (IRON 325) 325 (65 Fe) MG tablet Take 1 tablet by mouth 2 times daily (with meals) 21   Arnie Dale MD   nicotine (NICODERM CQ) 14 MG/24HR Place 1 patch onto the skin daily 21   Arnie Dale MD   fluticasone-vilanterol (BREO ELLIPTA) 100-25 MCG/INH AEPB inhaler Breo Ellipta 100 mcg-25 mcg/dose powder for inhalation   Inhale 1 puff every day by inhalation route.    ProviderArcadio MD   aspirin 81 MG chewable tablet aspirin 81 mg chewable tablet   Chew 1 tablet every day by oral route.    Provider, MD Arcadio   docusate sodium (COLACE) 100 MG capsule Colace 100 mg capsule   Take 1 capsule every day by

## 2024-12-05 ENCOUNTER — TELEPHONE (OUTPATIENT)
Age: 64
End: 2024-12-05

## 2024-12-05 NOTE — TELEPHONE ENCOUNTER
[facility] is requesting for the patients office notes to be sent over from the pt appt on 12/3  Fx # [id number]
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
## 2024-12-11 ENCOUNTER — TELEPHONE (OUTPATIENT)
Age: 64
End: 2024-12-11

## 2024-12-11 NOTE — TELEPHONE ENCOUNTER
Provider: CLARITZA    Caller: [facility]    Phone Number: [phone]    Reason for Call: [facility] REQUESTING OFFICE NOTES FOR TOMORROW'S VISIT. FAX NUMBER: [phone]

## 2024-12-12 ENCOUNTER — PATIENT ROUNDING (BHMG ONLY) (OUTPATIENT)
Age: 64
End: 2024-12-12
Payer: MEDICARE

## 2024-12-12 ENCOUNTER — OFFICE VISIT (OUTPATIENT)
Age: 64
End: 2024-12-12
Payer: MEDICARE

## 2024-12-12 VITALS
BODY MASS INDEX: 23.99 KG/M2 | SYSTOLIC BLOOD PRESSURE: 108 MMHG | OXYGEN SATURATION: 98 % | HEART RATE: 67 BPM | DIASTOLIC BLOOD PRESSURE: 62 MMHG | WEIGHT: 144 LBS | HEIGHT: 65 IN

## 2024-12-12 DIAGNOSIS — M20.41 HAMMERTOES OF BOTH FEET: ICD-10-CM

## 2024-12-12 DIAGNOSIS — L84 FOOT CALLUS: ICD-10-CM

## 2024-12-12 DIAGNOSIS — M79.672 BILATERAL FOOT PAIN: ICD-10-CM

## 2024-12-12 DIAGNOSIS — M79.671 BILATERAL FOOT PAIN: ICD-10-CM

## 2024-12-12 DIAGNOSIS — M20.11 HALLUX VALGUS, BILATERAL: ICD-10-CM

## 2024-12-12 DIAGNOSIS — B35.1 ONYCHOMYCOSIS: ICD-10-CM

## 2024-12-12 DIAGNOSIS — M20.42 HAMMERTOES OF BOTH FEET: ICD-10-CM

## 2024-12-12 DIAGNOSIS — M20.12 HALLUX VALGUS, BILATERAL: ICD-10-CM

## 2024-12-12 DIAGNOSIS — Z79.01 CURRENT USE OF LONG TERM ANTICOAGULATION: ICD-10-CM

## 2024-12-12 DIAGNOSIS — L60.2 ONYCHOGRYPHOSIS: Primary | ICD-10-CM

## 2024-12-12 DIAGNOSIS — Z86.73 HISTORY OF STROKE: ICD-10-CM

## 2024-12-12 PROBLEM — J45.909 ASTHMA: Status: ACTIVE | Noted: 2021-11-17

## 2024-12-12 PROBLEM — I21.9 MYOCARDIAL INFARCTION: Status: ACTIVE | Noted: 2021-10-23

## 2024-12-12 PROBLEM — J44.9 COPD (CHRONIC OBSTRUCTIVE PULMONARY DISEASE): Status: ACTIVE | Noted: 2021-10-23

## 2024-12-12 NOTE — PROGRESS NOTES
December 12, 2024    Hello, may I speak with Ivy Ruff?    My name is Soheila      I am  with [facility]  [address]  [facility] [address] [phone].    Before we get started may I verify your date of birth? 1960    I am calling to officially welcome you to our practice and ask about your recent visit. Is this a good time to talk? yes    Tell me about your visit with us. What things went well?  The whole visit was great.  She was amazing and was so gentle with my feet.  I feel so much better already.       We're always looking for ways to make our patients' experiences even better. Do you have recommendations on ways we may improve?  no, everyone was very friendly.    Overall were you satisfied with your first visit to our practice? yes       I appreciate you taking the time to speak with me today. Is there anything else I can do for you? no      Thank you, and have a great day.

## 2025-01-27 ENCOUNTER — OFFICE VISIT (OUTPATIENT)
Dept: CARDIOLOGY | Facility: CLINIC | Age: 65
End: 2025-01-27
Payer: MEDICARE

## 2025-01-27 VITALS
HEART RATE: 66 BPM | WEIGHT: 148 LBS | SYSTOLIC BLOOD PRESSURE: 96 MMHG | HEIGHT: 65 IN | DIASTOLIC BLOOD PRESSURE: 60 MMHG | OXYGEN SATURATION: 97 % | BODY MASS INDEX: 24.66 KG/M2

## 2025-01-27 DIAGNOSIS — E78.2 MIXED HYPERLIPIDEMIA: ICD-10-CM

## 2025-01-27 DIAGNOSIS — I25.10 CORONARY ARTERY DISEASE INVOLVING NATIVE CORONARY ARTERY OF NATIVE HEART WITHOUT ANGINA PECTORIS: Primary | ICD-10-CM

## 2025-01-27 DIAGNOSIS — I48.0 PAROXYSMAL ATRIAL FIBRILLATION: ICD-10-CM

## 2025-01-27 PROCEDURE — 93000 ELECTROCARDIOGRAM COMPLETE: CPT | Performed by: INTERNAL MEDICINE

## 2025-01-27 PROCEDURE — 99214 OFFICE O/P EST MOD 30 MIN: CPT | Performed by: INTERNAL MEDICINE

## 2025-01-27 PROCEDURE — 3078F DIAST BP <80 MM HG: CPT | Performed by: INTERNAL MEDICINE

## 2025-01-27 PROCEDURE — 1160F RVW MEDS BY RX/DR IN RCRD: CPT | Performed by: INTERNAL MEDICINE

## 2025-01-27 PROCEDURE — 3074F SYST BP LT 130 MM HG: CPT | Performed by: INTERNAL MEDICINE

## 2025-01-27 PROCEDURE — 1159F MED LIST DOCD IN RCRD: CPT | Performed by: INTERNAL MEDICINE

## 2025-01-27 RX ORDER — OMEPRAZOLE 40 MG/1
40 CAPSULE, DELAYED RELEASE ORAL DAILY
COMMUNITY

## 2025-01-27 NOTE — PROGRESS NOTES
Subjective:     Encounter Date:01/27/2025      Patient ID: Ivy Ruff is a 64 y.o. female with coronary artery disease with prior PCI to the distal right coronary artery in 2008, patent foramen ovale, status post closure in 2022 after a cryptogenic stroke, paroxysmal atrial fibrillation, hypertension, hyperlipidemia, presenting for routine follow-up.  The patient was previously followed in our office by Dr. Clark.    Chief Complaint: Here for follow-up of CAD, atrial fibrillation    History of Present Illness    This patient presents today for routine follow-up.  She was previously followed in our office by Dr. Clark.  The patient does have coronary artery disease with remote history of stent placement.  No significant chest pain at this time.  Occasionally, she will have some sharp discomfort in the chest that comes and goes very quickly with no provoking factors.  This is not necessarily new or unusual for her, and she actually thinks this is likely related to acid reflux.  She was placed on PPI therapy and she says that the symptoms are actually quite a bit improved.  She does have a history of paroxysmal atrial fibrillation.  She remains anticoagulated with Eliquis.  She denies have any palpitations at this time.  No significant bleeding issues on Eliquis.  She does have a history of cryptogenic stroke and PFO closure by Dr. Clark a number of years ago.  In addition, she has hypertension and hyperlipidemia.  Her blood pressure and cholesterol been well-controlled according to her report.  Well, the patient says that she seems to be doing well at this time.      Current Outpatient Medications:     albuterol sulfate HFA (Ventolin HFA) 108 (90 Base) MCG/ACT inhaler, Inhale 1 puff Every 4 (Four) Hours As Needed for Wheezing or Shortness of Air., Disp: , Rfl:     apixaban (ELIQUIS) 5 MG tablet tablet, 2 (Two) Times a Day. The patient has only been taking QD, Disp: , Rfl:     aspirin 81 MG chewable tablet,  1 tablet Daily., Disp: , Rfl:     atorvastatin (LIPITOR) 80 MG tablet, Take 1 tablet by mouth Daily., Disp: 30 tablet, Rfl: 5    docusate sodium (COLACE) 100 MG capsule, 1 capsule Daily., Disp: , Rfl:     esomeprazole (nexIUM) 20 MG capsule, 1 capsule As Needed., Disp: , Rfl:     lisinopril (PRINIVIL,ZESTRIL) 2.5 MG tablet, 1 tablet Daily., Disp: , Rfl:     metoprolol tartrate (LOPRESSOR) 25 MG tablet, 2 (Two) Times a Day., Disp: , Rfl:     omeprazole (priLOSEC) 40 MG capsule, Take 1 capsule by mouth Daily., Disp: , Rfl:     Allergies   Allergen Reactions    Nuts Anaphylaxis     All nuts    Contrast Dye (Echo Or Unknown Ct/Mr) Hives    Morphine Mental Status Change     Becomes mean     Social History     Tobacco Use    Smoking status: Former     Current packs/day: 0.00     Types: Cigarettes     Quit date: 3/1/2022     Years since quittin.9    Smokeless tobacco: Never    Tobacco comments:     Quit 3/1/22   Substance Use Topics    Alcohol use: Not Currently     Review of Systems   Cardiovascular:  Negative for chest pain, claudication, dyspnea on exertion, leg swelling, orthopnea, palpitations, paroxysmal nocturnal dyspnea and syncope.   Respiratory:  Negative for cough, shortness of breath and wheezing.    Hematologic/Lymphatic: Negative for bleeding problem. Does not bruise/bleed easily.   Gastrointestinal:  Negative for abdominal pain, hematemesis, hematochezia, melena, nausea and vomiting.   Neurological:  Negative for dizziness and light-headedness.       ECG 12 Lead    Date/Time: 2025 10:39 AM  Performed by: Chuckie Garza MD    Authorized by: Chuckie Garza MD  Comparison: compared with previous ECG from 2023  Similar to previous ECG  Rhythm: sinus rhythm  Rate: normal  BPM: 62  Conduction: conduction normal  ST Segments: ST segments normal  T Waves: T waves normal  QRS axis: normal  Other findings: poor R wave progression    Clinical impression: abnormal EKG          "  Objective:     Vitals reviewed.   Constitutional:       General: Not in acute distress.     Appearance: Well-developed and not in distress.   HENT:      Head: Normocephalic and atraumatic.   Neck:      Vascular: No carotid bruit or JVD.   Pulmonary:      Effort: Pulmonary effort is normal.      Breath sounds: Normal breath sounds. No wheezing. No rhonchi. No rales.   Cardiovascular:      Normal rate. Regular rhythm.      Murmurs: There is no murmur.      No gallop.    Pulses:     Intact distal pulses.   Edema:     Peripheral edema absent.   Abdominal:      General: Bowel sounds are normal. There is no distension.      Palpations: Abdomen is soft.      Tenderness: There is no abdominal tenderness.   Skin:     General: Skin is warm and dry. There is no cyanosis.      Findings: No erythema or rash.   Neurological:      Mental Status: Alert. Mental status is at baseline.       BP 96/60   Pulse 66   Ht 163.8 cm (64.5\")   Wt 67.1 kg (148 lb)   SpO2 97%   BMI 25.01 kg/m²     Data/Lab Review:     Lipid panel from 3/16/2024: Total cholesterol 160, triglycerides 191, HDL 41, LDL 86    Results for orders placed during the hospital encounter of 12/28/22    Adult Transthoracic Echo Complete w/ Color, Spectral and Contrast if necessary per protocol    Interpretation Summary    Left ventricular systolic function is normal. Estimated left ventricular EF = 60%    Left ventricular diastolic function is consistent with (grade I) impaired relaxation.    Estimated right ventricular systolic pressure from tricuspid regurgitation is normal (<35 mmHg).    Images from the cardiac catheterization in February 2022 are reviewed and after the review my interpretation is as follows: The images show nonobstructive coronary artery disease at that time with patent stent of the distal right coronary artery.      Assessment:          Diagnosis Plan   1. Coronary artery disease involving native coronary artery of native heart without angina " pectoris  ECG 12 Lead      2. Paroxysmal atrial fibrillation        3. Mixed hyperlipidemia             Plan:       1.  Coronary artery disease: Stable at this time.  No anginal symptoms described.  I did review the patient's most recent cardiac catheterization approximately 3 years ago showing minimal disease in the left coronary system with minimal disease in the right system and a patent stent in the distal right coronary artery.  The patient is anticoagulated but has also been continued on aspirin as well.  I am not certain of the utility of aspirin at this time given that the patient is chronically anticoagulated, but she currently has no significant bleeding issues.  She does remain on beta-blocker and statin therapies.  I would not recommend any further testing or changes in therapies at this time.    2.  Paroxysmal atrial fibrillation: Clinically stable.  The patient is in sinus rhythm today.  She does remain anticoagulated for stroke risk reduction.    [id number] SCORE   [id number] Score: 4 (1/27/2025 10:55 AM)    3.  Mixed hyperlipidemia: I reviewed the patient's most recent lipid panel.  Her LDL cholesterol not quite at goal of less than 70 but is in the 80s, consistent with prior assessments.  Continue current dose of statin therapy.  If LDL increases over time, consider PCSK9 inhibitor therapy but otherwise, her lipid status has been stable for quite some time.    I will plan to see the patient again in 1 year unless otherwise needed sooner.

## 2025-02-03 ENCOUNTER — OFFICE VISIT (OUTPATIENT)
Age: 65
End: 2025-02-03
Payer: MEDICAID

## 2025-02-03 DIAGNOSIS — M25.511 PERISCAPULAR PAIN OF RIGHT SHOULDER: ICD-10-CM

## 2025-02-03 DIAGNOSIS — S52.571D OTHER CLOSED INTRA-ARTICULAR FRACTURE OF DISTAL END OF RIGHT RADIUS WITH ROUTINE HEALING, SUBSEQUENT ENCOUNTER: ICD-10-CM

## 2025-02-03 DIAGNOSIS — M25.531 RIGHT WRIST PAIN: Primary | ICD-10-CM

## 2025-02-03 PROCEDURE — 99214 OFFICE O/P EST MOD 30 MIN: CPT | Performed by: PHYSICIAN ASSISTANT

## 2025-02-03 PROCEDURE — 1123F ACP DISCUSS/DSCN MKR DOCD: CPT | Performed by: PHYSICIAN ASSISTANT

## 2025-02-03 RX ORDER — METHYLPREDNISOLONE 4 MG/1
TABLET ORAL
Qty: 1 KIT | Refills: 0 | Status: SHIPPED | OUTPATIENT
Start: 2025-02-03 | End: 2025-02-09
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
## 2025-03-13 ENCOUNTER — OFFICE VISIT (OUTPATIENT)
Age: 65
End: 2025-03-13
Payer: MEDICARE

## 2025-03-13 VITALS
WEIGHT: 127 LBS | DIASTOLIC BLOOD PRESSURE: 78 MMHG | SYSTOLIC BLOOD PRESSURE: 124 MMHG | BODY MASS INDEX: 21.16 KG/M2 | OXYGEN SATURATION: 98 % | HEART RATE: 71 BPM | HEIGHT: 65 IN

## 2025-03-13 DIAGNOSIS — M79.676 PAIN AROUND TOENAIL: ICD-10-CM

## 2025-03-13 DIAGNOSIS — L84 FOOT CALLUS: ICD-10-CM

## 2025-03-13 DIAGNOSIS — M79.671 BILATERAL FOOT PAIN: ICD-10-CM

## 2025-03-13 DIAGNOSIS — Z86.73 HISTORY OF STROKE: ICD-10-CM

## 2025-03-13 DIAGNOSIS — B35.1 ONYCHOMYCOSIS: Primary | ICD-10-CM

## 2025-03-13 DIAGNOSIS — Z79.01 CURRENT USE OF LONG TERM ANTICOAGULATION: ICD-10-CM

## 2025-03-13 DIAGNOSIS — M79.672 BILATERAL FOOT PAIN: ICD-10-CM

## 2025-03-13 DIAGNOSIS — M79.675 PAIN IN TOE OF LEFT FOOT: ICD-10-CM

## 2025-03-13 RX ORDER — VIT C/B6/B5/MAGNESIUM/HERB 173 50-5-6-5MG
CAPSULE ORAL
COMMUNITY

## 2025-04-01 ENCOUNTER — OFFICE VISIT (OUTPATIENT)
Age: 65
End: 2025-04-01
Payer: MEDICARE

## 2025-04-01 DIAGNOSIS — M12.561 TRAUMATIC ARTHRITIS OF RIGHT KNEE: Primary | ICD-10-CM

## 2025-04-01 DIAGNOSIS — M25.561 CHRONIC PAIN OF RIGHT KNEE: ICD-10-CM

## 2025-04-01 DIAGNOSIS — G89.29 CHRONIC PAIN OF RIGHT KNEE: ICD-10-CM

## 2025-04-01 PROCEDURE — G8427 DOCREV CUR MEDS BY ELIG CLIN: HCPCS | Performed by: PHYSICIAN ASSISTANT

## 2025-04-01 PROCEDURE — 3017F COLORECTAL CA SCREEN DOC REV: CPT | Performed by: PHYSICIAN ASSISTANT

## 2025-04-01 PROCEDURE — 4004F PT TOBACCO SCREEN RCVD TLK: CPT | Performed by: PHYSICIAN ASSISTANT

## 2025-04-01 PROCEDURE — G8399 PT W/DXA RESULTS DOCUMENT: HCPCS | Performed by: PHYSICIAN ASSISTANT

## 2025-04-01 PROCEDURE — G8420 CALC BMI NORM PARAMETERS: HCPCS | Performed by: PHYSICIAN ASSISTANT

## 2025-04-01 PROCEDURE — 1159F MED LIST DOCD IN RCRD: CPT | Performed by: PHYSICIAN ASSISTANT

## 2025-04-01 PROCEDURE — 1090F PRES/ABSN URINE INCON ASSESS: CPT | Performed by: PHYSICIAN ASSISTANT

## 2025-04-01 PROCEDURE — 99213 OFFICE O/P EST LOW 20 MIN: CPT | Performed by: PHYSICIAN ASSISTANT

## 2025-04-01 PROCEDURE — 1123F ACP DISCUSS/DSCN MKR DOCD: CPT | Performed by: PHYSICIAN ASSISTANT

## 2025-04-01 PROCEDURE — 20610 DRAIN/INJ JOINT/BURSA W/O US: CPT | Performed by: PHYSICIAN ASSISTANT

## 2025-04-01 RX ORDER — BETAMETHASONE SODIUM PHOSPHATE AND BETAMETHASONE ACETATE 3; 3 MG/ML; MG/ML
6 INJECTION, SUSPENSION INTRA-ARTICULAR; INTRALESIONAL; INTRAMUSCULAR; SOFT TISSUE ONCE
Status: COMPLETED | OUTPATIENT
Start: 2025-04-01 | End: 2025-04-01

## 2025-04-01 RX ORDER — BUPIVACAINE HYDROCHLORIDE 5 MG/ML
3 INJECTION, SOLUTION EPIDURAL; INTRACAUDAL; PERINEURAL ONCE
Status: COMPLETED | OUTPATIENT
Start: 2025-04-01 | End: 2025-04-01

## 2025-04-01 RX ORDER — LIDOCAINE HYDROCHLORIDE 10 MG/ML
1 INJECTION, SOLUTION INFILTRATION; PERINEURAL ONCE
Status: COMPLETED | OUTPATIENT
Start: 2025-04-01 | End: 2025-04-01

## 2025-04-01 RX ADMIN — LIDOCAINE HYDROCHLORIDE 1 ML: 10 INJECTION, SOLUTION INFILTRATION; PERINEURAL at 09:16

## 2025-04-01 RX ADMIN — BUPIVACAINE HYDROCHLORIDE 15 MG: 5 INJECTION, SOLUTION EPIDURAL; INTRACAUDAL; PERINEURAL at 09:16

## 2025-04-01 RX ADMIN — BETAMETHASONE SODIUM PHOSPHATE AND BETAMETHASONE ACETATE 6 MG: 3; 3 INJECTION, SUSPENSION INTRA-ARTICULAR; INTRALESIONAL; INTRAMUSCULAR; SOFT TISSUE at 09:15

## 2025-04-01 NOTE — PROGRESS NOTES
Orthopaedic Clinic Note - Established Patient    NAME:  Hui Santana   : 1960  MRN: [id number]      2025      CHIEF COMPLAINT: Right knee pain      HISTORY OF PRESENT ILLNESS:   This is a pleasant 64-year-old female comes in with complaints of right knee pain.  Patient is status post open duction total fixation of a right femoral shaft fracture back in .  Patient is complaining of right knee pain.  No recent injury or trauma.  Pain moderate, constant, achy throbbing.  She reports swelling and decreased range of motion.  She is here discuss further treatment options.    Past Medical History:    No past medical history on file.    Past Surgical History:        Procedure Laterality Date    FEMUR FRACTURE SURGERY  10/23/2021    FEMUR OPEN REDUCTION INTERNAL FIXATION BONE GRAFT performed by Cecilio Sears MD at [facility] OR    WRIST FRACTURE SURGERY Right 10/23/2021    WRIST OPEN REDUCTION INTERNAL FIXATION performed by Cecilio Sears MD at [facility] OR       Current Medications:   Prior to Admission medications    Medication Sig Start Date End Date Taking? Authorizing Provider   metoprolol tartrate (LOPRESSOR) 25 MG tablet Take 1 tablet by mouth 2 times daily 21  Yes Arnie Dale MD   ferrous sulfate (IRON 325) 325 (65 Fe) MG tablet Take 1 tablet by mouth 2 times daily (with meals) 21  Yes Arnie Dale MD   nicotine (NICODERM CQ) 14 MG/24HR Place 1 patch onto the skin daily 21  Yes Arnie Dale MD   fluticasone-vilanterol (BREO ELLIPTA) 100-25 MCG/INH AEPB inhaler Breo Ellipta 100 mcg-25 mcg/dose powder for inhalation   Inhale 1 puff every day by inhalation route.   Yes Arcadio Simmons MD   aspirin 81 MG chewable tablet aspirin 81 mg chewable tablet   Chew 1 tablet every day by oral route.   Yes Arcadio Simmons MD   docusate sodium (COLACE) 100 MG capsule Colace 100 mg capsule   Take 1 capsule every day by oral route.   Yes Arcadio Simmons MD   esomeprazole (NEXIUM) 20 MG delayed release

## 2025-05-01 ENCOUNTER — TRANSCRIBE ORDERS (OUTPATIENT)
Dept: ADMINISTRATIVE | Facility: HOSPITAL | Age: 65
End: 2025-05-01
Payer: MEDICARE

## 2025-05-01 DIAGNOSIS — Z12.31 ENCOUNTER FOR SCREENING MAMMOGRAM FOR MALIGNANT NEOPLASM OF BREAST: Primary | ICD-10-CM

## 2025-05-01 DIAGNOSIS — Z12.2 ENCOUNTER FOR SCREENING FOR MALIGNANT NEOPLASM OF RESPIRATORY ORGANS: ICD-10-CM

## 2025-06-02 ENCOUNTER — HOSPITAL ENCOUNTER (OUTPATIENT)
Dept: CT IMAGING | Facility: HOSPITAL | Age: 65
Discharge: HOME | End: 2025-06-02
Payer: MEDICARE

## 2025-06-02 ENCOUNTER — HOSPITAL ENCOUNTER (OUTPATIENT)
Dept: MAMMOGRAPHY | Facility: HOSPITAL | Age: 65
Discharge: HOME | End: 2025-06-02
Payer: MEDICARE

## 2025-06-02 DIAGNOSIS — Z12.31 ENCOUNTER FOR SCREENING MAMMOGRAM FOR MALIGNANT NEOPLASM OF BREAST: ICD-10-CM

## 2025-06-02 DIAGNOSIS — Z12.2 ENCOUNTER FOR SCREENING FOR MALIGNANT NEOPLASM OF RESPIRATORY ORGANS: ICD-10-CM

## 2025-06-02 PROCEDURE — 77063 BREAST TOMOSYNTHESIS BI: CPT

## 2025-06-02 PROCEDURE — 71271 CT THORAX LUNG CANCER SCR C-: CPT

## 2025-06-02 PROCEDURE — 77067 SCR MAMMO BI INCL CAD: CPT
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
## 2025-06-13 ENCOUNTER — OFFICE VISIT (OUTPATIENT)
Age: 65
End: 2025-06-13
Payer: MEDICARE

## 2025-06-13 VITALS
DIASTOLIC BLOOD PRESSURE: 78 MMHG | HEART RATE: 67 BPM | SYSTOLIC BLOOD PRESSURE: 116 MMHG | WEIGHT: 127 LBS | HEIGHT: 65 IN | OXYGEN SATURATION: 97 % | BODY MASS INDEX: 21.16 KG/M2

## 2025-06-13 DIAGNOSIS — Z79.01 CURRENT USE OF LONG TERM ANTICOAGULATION: ICD-10-CM

## 2025-06-13 DIAGNOSIS — M79.676 PAIN AROUND TOENAIL: ICD-10-CM

## 2025-06-13 DIAGNOSIS — Z86.73 HISTORY OF STROKE: ICD-10-CM

## 2025-06-13 DIAGNOSIS — L84 FOOT CALLUS: ICD-10-CM

## 2025-06-13 DIAGNOSIS — L60.3 NAIL DYSTROPHY: ICD-10-CM

## 2025-06-13 DIAGNOSIS — B35.1 ONYCHOMYCOSIS: Primary | ICD-10-CM

## 2025-07-03 ENCOUNTER — OFFICE VISIT (OUTPATIENT)
Age: 65
End: 2025-07-03
Payer: MEDICARE

## 2025-07-03 DIAGNOSIS — M12.561 TRAUMATIC ARTHRITIS OF RIGHT KNEE: Primary | ICD-10-CM

## 2025-07-03 PROCEDURE — 3017F COLORECTAL CA SCREEN DOC REV: CPT | Performed by: PHYSICIAN ASSISTANT

## 2025-07-03 PROCEDURE — 1090F PRES/ABSN URINE INCON ASSESS: CPT | Performed by: PHYSICIAN ASSISTANT

## 2025-07-03 PROCEDURE — G8420 CALC BMI NORM PARAMETERS: HCPCS | Performed by: PHYSICIAN ASSISTANT

## 2025-07-03 PROCEDURE — 99213 OFFICE O/P EST LOW 20 MIN: CPT | Performed by: PHYSICIAN ASSISTANT

## 2025-07-03 PROCEDURE — G8399 PT W/DXA RESULTS DOCUMENT: HCPCS | Performed by: PHYSICIAN ASSISTANT

## 2025-07-03 PROCEDURE — 4004F PT TOBACCO SCREEN RCVD TLK: CPT | Performed by: PHYSICIAN ASSISTANT

## 2025-07-03 PROCEDURE — 1123F ACP DISCUSS/DSCN MKR DOCD: CPT | Performed by: PHYSICIAN ASSISTANT

## 2025-07-03 PROCEDURE — G8427 DOCREV CUR MEDS BY ELIG CLIN: HCPCS | Performed by: PHYSICIAN ASSISTANT

## 2025-07-03 RX ORDER — APIXABAN 5 MG/1
5 TABLET, FILM COATED ORAL 2 TIMES DAILY
COMMUNITY

## 2025-07-03 RX ORDER — LISINOPRIL 2.5 MG/1
2.5 TABLET ORAL DAILY
COMMUNITY
Start: 2025-05-19

## 2025-07-03 NOTE — PROGRESS NOTES
[facility] Note - Established Patient    NAME:  Hui Santana   : 1960  MRN: [id number]      7/3/2025      CHIEF COMPLAINT: Follow-up right knee pain      HISTORY OF PRESENT ILLNESS:   Is a pleasant six 4-year-old comes in for follow-up for right knee pain.  She has a history of posttraumatic osteoarthritis of right knee.  She has had injections in the past which did give her moderate relief.  She still has pain but better after the injection.    Past Medical History:    No past medical history on file.    Past Surgical History:        Procedure Laterality Date    FEMUR FRACTURE SURGERY  10/23/2021    FEMUR OPEN REDUCTION INTERNAL FIXATION BONE GRAFT performed by Cecilio Sears MD at [facility] OR    WRIST FRACTURE SURGERY Right 10/23/2021    WRIST OPEN REDUCTION INTERNAL FIXATION performed by Cecilio Sears MD at [facility] OR       Current Medications:   Prior to Admission medications    Medication Sig Start Date End Date Taking? Authorizing Provider   ELIQUIS 5 MG TABS tablet Take 1 tablet by mouth 2 times daily   Yes Arcadio Simmons MD   lisinopril (PRINIVIL;ZESTRIL) 2.5 MG tablet Take 1 tablet by mouth daily 25  Yes Arcadio Simmons MD   metoprolol tartrate (LOPRESSOR) 25 MG tablet Take 1 tablet by mouth 2 times daily 21  Yes Arnie Dale MD   ferrous sulfate (IRON 325) 325 (65 Fe) MG tablet Take 1 tablet by mouth 2 times daily (with meals) 21  Yes Arnie Dale MD   nicotine (NICODERM CQ) 14 MG/24HR Place 1 patch onto the skin daily 21  Yes Arnie Dale MD   fluticasone-vilanterol (BREO ELLIPTA) 100-25 MCG/INH AEPB inhaler Breo Ellipta 100 mcg-25 mcg/dose powder for inhalation   Inhale 1 puff every day by inhalation route.   Yes Arcadio Simmons MD   aspirin 81 MG chewable tablet aspirin 81 mg chewable tablet   Chew 1 tablet every day by oral route.   Yes Arcadio Simmons MD   docusate sodium (COLACE) 100 MG capsule Colace 100 mg capsule   Take 1 capsule every day by oral

## (undated) DEVICE — THE CHANNEL CLEANING BRUSH IS A NYLON FLEXI BRUSH ATTACHED TO A FLEXIBLE PLASTIC SHEATH DESIGNED TO SAFELY REMOVE DEBRIS FROM FLEXIBLE ENDOSCOPES.

## (undated) DEVICE — SOLIDIFIER LIQUI LOC PLUS 2000CC

## (undated) DEVICE — CANN NASL ETCO2 LO/FLO A/

## (undated) DEVICE — GUIDEWIRE ORTH DIA2.5MM LOK SMOOTH DRL TIP FLX THRD FOR

## (undated) DEVICE — GLOVE SURG SZ 7 CRM LTX FREE POLYISOPRENE POLYMER BEAD ANTI

## (undated) DEVICE — ZIMMER® STERILE DISPOSABLE TOURNIQUET CUFF WITH PLC, DUAL PORT, SINGLE BLADDER, 18 IN. (46 CM)

## (undated) DEVICE — GLOVE SURG SZ 8 CRM LTX FREE POLYISOPRENE POLYMER BEAD ANTI

## (undated) DEVICE — SURGICAL PROCEDURE PACK LOWER EXTREMITY LOURDES HOSP

## (undated) DEVICE — CATH ULTRASND ECHO ACUNAV FOR ACUSON 8F 90CM

## (undated) DEVICE — ANGIO-SEAL VIP VASCULAR CLOSURE DEVICE: Brand: ANGIO-SEAL

## (undated) DEVICE — MASK,OXYGEN,MED CONC,ADLT,7' TUB, UC: Brand: PENDING

## (undated) DEVICE — PK CATH CARD 30 CA/4

## (undated) DEVICE — BIT DRL L180MM DIA4.3MM QUIK CPL W/O STP REUSE

## (undated) DEVICE — SCREW BNE L12MM DIA24MM CORT S STL ST T8 STARDRV RECESS
Type: IMPLANTABLE DEVICE | Site: WRIST | Status: NON-FUNCTIONAL
Removed: 2021-10-23

## (undated) DEVICE — MODEL AT P65, P/N 701554-001KIT CONTENTS: HAND CONTROLLER, 3-WAY HIGH-PRESSURE STOPCOCK WITH ROTATING END AND PREMIUM HIGH-PRESSURE TUBING: Brand: ANGIOTOUCH® KIT

## (undated) DEVICE — Device: Brand: DEFENDO AIR/WATER/SUCTION AND BIOPSY VALVE

## (undated) DEVICE — PACK EXTREMITY

## (undated) DEVICE — PERCLOSE™ PROSTYLE™ SUTURE-MEDIATED CLOSURE AND REPAIR SYSTEM: Brand: PERCLOSE™ PROSTYLE™

## (undated) DEVICE — GW STARTER FXD CORE J .035 3X150CM 3MM

## (undated) DEVICE — TOWEL,OR,DSP,ST,BLUE,DLX,4/PK,20PK/CS: Brand: MEDLINE

## (undated) DEVICE — PAD, DEFIB, ADULT, RADIOTRANS, PHILIPS: Brand: MEDLINE

## (undated) DEVICE — E-Z CLEAN, NON-STICK, PTFE COATED, ELECTROSURGICAL NEEDLE ELECTRODE, MODIFIED EXTENDED INSULATION, 2.75 INCH (7 CM): Brand: MEGADYNE

## (undated) DEVICE — SENSR O2 OXIMAX FNGR A/ 18IN NONSTR

## (undated) DEVICE — PINNACLE INTRODUCER SHEATH: Brand: PINNACLE

## (undated) DEVICE — STERILE (15.2 TAPERED TO 7.6 X 183CM) POLYETHYLENE ACCORDION-FOLDED COVER FOR USE WITH SIEMENS ACUNAV ULTRASOUND CATHETER FAMILY CONNECTOR: Brand: SWIFTLINK TRANSDUCER COVER

## (undated) DEVICE — SOLUTION IV 1000ML 0.9% SOD CHL FOR IRRIG PLAS CONT

## (undated) DEVICE — BIT DRL L300MM DIA4.3MM QUIK CPL PERC CALIB W/O STP REUSE

## (undated) DEVICE — SYS DEL TREVISIO 45D 9F 80CM

## (undated) DEVICE — DEVICE PUL REDUC EXT FIX FOR 43MM PERC DRL GUID NUT

## (undated) DEVICE — RADIFOCUS GLIDEWIRE ADVANTAGE GUIDEWIRE: Brand: GLIDEWIRE ADVANTAGE

## (undated) DEVICE — PAD, DEFIB, ADULT, RADIOTRANS, PHYSIO: Brand: MEDLINE

## (undated) DEVICE — BIT DRL L110MM DIA1.8MM QUIK CPL CALIB W/O STP REUSE

## (undated) DEVICE — C-ARM: Brand: UNBRANDED

## (undated) DEVICE — CATH F6 ST+ MP A1 100CM: Brand: SUPER TORQUE

## (undated) DEVICE — UNDERGLOVE SURG SZ 8 FNGR THK0.21MIL GRN LTX BEAD CUF

## (undated) DEVICE — IMMOB KN 3PNL DLX CANVS 19IN BLU

## (undated) DEVICE — GUIDEWIRE ORTH L300MM DIA2.8MM S STL THRD SHRP TRCR TIP FOR

## (undated) DEVICE — SOL IRR NACL 0.9PCT BT 1000ML

## (undated) DEVICE — GLOVE SURG SZ 65 CRM LTX FREE POLYISOPRENE POLYMER BEAD ANTI

## (undated) DEVICE — SYSTEM SKIN CLSR 22CM DERMBND PRINEO

## (undated) DEVICE — IMMOBILIZER SLING: Brand: DEROYAL

## (undated) DEVICE — GLOVE ORTHO 8   MSG9480

## (undated) DEVICE — YANKAUER,BULB TIP WITH VENT: Brand: ARGYLE

## (undated) DEVICE — SUTURE VCRL SZ 3-0 L27IN ABSRB UD L26MM SH 1/2 CIR J416H

## (undated) DEVICE — CATH DIAG IMPULSE M/ PK 145 5FR

## (undated) DEVICE — MODEL BT2000 P/N 700287-012KIT CONTENTS: MANIFOLD WITH SALINE AND CONTRAST PORTS, SALINE TUBING WITH SPIKE AND HAND SYRINGE, TRANSDUCER: Brand: BT2000 AUTOMATED MANIFOLD KIT